# Patient Record
Sex: FEMALE | Race: WHITE | NOT HISPANIC OR LATINO | Employment: FULL TIME | URBAN - METROPOLITAN AREA
[De-identification: names, ages, dates, MRNs, and addresses within clinical notes are randomized per-mention and may not be internally consistent; named-entity substitution may affect disease eponyms.]

---

## 2017-01-04 ENCOUNTER — OFFICE VISIT (OUTPATIENT)
Dept: INTERNAL MEDICINE | Facility: CLINIC | Age: 47
End: 2017-01-04
Payer: OTHER GOVERNMENT

## 2017-01-04 VITALS
HEART RATE: 62 BPM | BODY MASS INDEX: 22.44 KG/M2 | TEMPERATURE: 98 F | WEIGHT: 134.69 LBS | RESPIRATION RATE: 16 BRPM | HEIGHT: 65 IN | SYSTOLIC BLOOD PRESSURE: 120 MMHG | DIASTOLIC BLOOD PRESSURE: 82 MMHG

## 2017-01-04 DIAGNOSIS — K58.9 IRRITABLE BOWEL SYNDROME WITHOUT DIARRHEA: ICD-10-CM

## 2017-01-04 DIAGNOSIS — F41.9 ANXIETY: Primary | Chronic | ICD-10-CM

## 2017-01-04 PROCEDURE — 99213 OFFICE O/P EST LOW 20 MIN: CPT | Mod: S$PBB,,, | Performed by: INTERNAL MEDICINE

## 2017-01-04 PROCEDURE — 99213 OFFICE O/P EST LOW 20 MIN: CPT | Mod: PBBFAC,PO | Performed by: INTERNAL MEDICINE

## 2017-01-04 PROCEDURE — 99999 PR PBB SHADOW E&M-EST. PATIENT-LVL III: CPT | Mod: PBBFAC,,, | Performed by: INTERNAL MEDICINE

## 2017-01-04 RX ORDER — ALPRAZOLAM 0.5 MG/1
0.5 TABLET ORAL 2 TIMES DAILY PRN
Qty: 60 TABLET | Refills: 0 | Status: SHIPPED | OUTPATIENT
Start: 2017-01-04 | End: 2017-01-04 | Stop reason: SDUPTHER

## 2017-01-04 RX ORDER — ALPRAZOLAM 0.5 MG/1
0.5 TABLET ORAL 2 TIMES DAILY PRN
Qty: 60 TABLET | Refills: 0 | Status: SHIPPED | OUTPATIENT
Start: 2017-01-04 | End: 2017-01-06 | Stop reason: SDUPTHER

## 2017-01-05 NOTE — PROGRESS NOTES
Subjective:       Patient ID: Marva He is a 46 y.o. female.    Chief Complaint: Medication Problem (med review)    HPI     Patient is a 45 yo F here today for medication refill. She has been doing well. Stress levels controlled/stable at work. She says she had pleasant holiday with time off. She has no active SI/HI. No sleep disturbance. She finds trouble losing weight. Not gaining any weight. She exercises 5 times per week with about 1 hour of cardio and watches her diet well. She does drink alcohol (wine) nightly but not in excess of one glass. She has no other acute issues today.    Review of Systems   Constitutional: Negative for chills, fatigue and fever.   HENT: Negative for congestion, ear pain, postnasal drip, rhinorrhea, sinus pressure and sore throat.    Eyes: Negative for itching and visual disturbance.   Respiratory: Negative for cough, shortness of breath and wheezing.    Cardiovascular: Negative for chest pain, palpitations and leg swelling.   Gastrointestinal: Negative for abdominal pain and nausea.   Genitourinary: Negative for dysuria.   Musculoskeletal: Negative for arthralgias and myalgias.   Skin: Negative for rash.   Neurological: Negative for weakness, light-headedness and headaches.       Objective:      Physical Exam   Constitutional: She is oriented to person, place, and time. She appears well-developed and well-nourished. No distress.   HENT:   Head: Normocephalic and atraumatic.   Mouth/Throat: Oropharynx is clear and moist. No oropharyngeal exudate.   Eyes: Conjunctivae and EOM are normal. Pupils are equal, round, and reactive to light. Right eye exhibits no discharge. Left eye exhibits no discharge.   Neck: Normal range of motion. Neck supple. No thyromegaly present.   Cardiovascular: Normal rate, regular rhythm and normal heart sounds.    No murmur heard.  Pulmonary/Chest: Effort normal and breath sounds normal. No respiratory distress. She has no wheezes. She has no rales.    Abdominal: Soft. She exhibits no distension. There is no tenderness.   Musculoskeletal: She exhibits no edema.   Lymphadenopathy:     She has no cervical adenopathy.   Neurological: She is alert and oriented to person, place, and time.   Skin: Skin is warm and dry. She is not diaphoretic.   Nursing note and vitals reviewed.      Assessment:       1. Anxiety    2. Irritable bowel syndrome without diarrhea        Plan:       She is concerned about difficulty losing weight  Given her exercise regimen, discussed some dietary changes could be made. Reduce stress levels.  Anxiety well controlled for now. Refills given for Prozac. Need to fax new Rx for Xanax 0.5 mg BID to mail order pharmacy for her. No other issues at this time.   IBS sx stable

## 2017-01-06 DIAGNOSIS — F41.9 ANXIETY: Chronic | ICD-10-CM

## 2017-01-06 RX ORDER — ALPRAZOLAM 0.5 MG/1
0.5 TABLET ORAL 2 TIMES DAILY PRN
Qty: 60 TABLET | Refills: 0 | Status: SHIPPED | OUTPATIENT
Start: 2017-01-06 | End: 2017-01-27 | Stop reason: SDUPTHER

## 2017-01-18 ENCOUNTER — TELEPHONE (OUTPATIENT)
Dept: INTERNAL MEDICINE | Facility: CLINIC | Age: 47
End: 2017-01-18

## 2017-01-18 NOTE — TELEPHONE ENCOUNTER
----- Message from Alicia Bangura sent at 1/18/2017 10:11 AM CST -----  Contact: Pt at [phone]  Pt said she still has not received script alprazolam (XANAX) 0.5 MG tablet in mail yet. Pt has been out of this medication for about 1 week now.     Pt wants to know if you can fill script at [facility] for a few tablets to last her until the script arrives in the mail:  [facility]:  [phone] (phone)

## 2017-01-18 NOTE — TELEPHONE ENCOUNTER
Spoke with pt-she is awaiting for mail order to come in- Dr Garcia ok'ed 28 tabs. rx'ed called into local pharmacy

## 2017-01-19 ENCOUNTER — OFFICE VISIT (OUTPATIENT)
Dept: INTERNAL MEDICINE | Facility: CLINIC | Age: 47
End: 2017-01-19
Payer: OTHER GOVERNMENT

## 2017-01-19 VITALS
TEMPERATURE: 99 F | WEIGHT: 133.19 LBS | HEART RATE: 78 BPM | SYSTOLIC BLOOD PRESSURE: 116 MMHG | HEIGHT: 65 IN | BODY MASS INDEX: 22.19 KG/M2 | DIASTOLIC BLOOD PRESSURE: 84 MMHG | RESPIRATION RATE: 16 BRPM

## 2017-01-19 DIAGNOSIS — S33.5XXA LUMBAR BACK SPRAIN, INITIAL ENCOUNTER: Primary | ICD-10-CM

## 2017-01-19 DIAGNOSIS — M62.830 MUSCLE SPASM OF BACK: ICD-10-CM

## 2017-01-19 PROCEDURE — 96372 THER/PROPH/DIAG INJ SC/IM: CPT | Mod: PBBFAC,PO

## 2017-01-19 PROCEDURE — 99214 OFFICE O/P EST MOD 30 MIN: CPT | Mod: S$PBB,,, | Performed by: INTERNAL MEDICINE

## 2017-01-19 PROCEDURE — 99213 OFFICE O/P EST LOW 20 MIN: CPT | Mod: PBBFAC,PO | Performed by: INTERNAL MEDICINE

## 2017-01-19 PROCEDURE — 99999 PR PBB SHADOW E&M-EST. PATIENT-LVL III: CPT | Mod: PBBFAC,,, | Performed by: INTERNAL MEDICINE

## 2017-01-19 RX ORDER — PREDNISONE 20 MG/1
TABLET ORAL
Qty: 20 TABLET | Refills: 0 | Status: SHIPPED | OUTPATIENT
Start: 2017-01-19 | End: 2017-08-03

## 2017-01-19 RX ORDER — TRAMADOL HYDROCHLORIDE 50 MG/1
50 TABLET ORAL EVERY 6 HOURS PRN
Qty: 45 TABLET | Refills: 0 | Status: SHIPPED | OUTPATIENT
Start: 2017-01-19 | End: 2017-01-29

## 2017-01-19 RX ORDER — KETOROLAC TROMETHAMINE 30 MG/ML
30 INJECTION, SOLUTION INTRAMUSCULAR; INTRAVENOUS
Status: COMPLETED | OUTPATIENT
Start: 2017-01-19 | End: 2017-01-19

## 2017-01-19 RX ADMIN — KETOROLAC TROMETHAMINE 30 MG: 30 INJECTION, SOLUTION INTRAMUSCULAR at 03:01

## 2017-01-19 NOTE — PROGRESS NOTES
Subjective:       Patient ID: Marva He is a 46 y.o. female.    Chief Complaint: Back Pain    HPI   Pt here for evaluation of acute onset of B/L lower back pain since yesterday afternoon. The pain is described as dull ache at rest and sharp with bending/walking. The pain can be rated up to a 7/10. Some improvement with NSAIDs/Heating pad. Pt does exercise on a regular basis but no heavy weights.   Review of Systems   Constitutional: Negative for activity change, appetite change, chills, diaphoresis, fatigue, fever and unexpected weight change.   HENT: Negative for postnasal drip, rhinorrhea, sinus pressure, sneezing, sore throat, trouble swallowing and voice change.    Respiratory: Negative for cough, shortness of breath and wheezing.    Cardiovascular: Negative for chest pain, palpitations and leg swelling.   Gastrointestinal: Negative for abdominal pain, blood in stool, constipation, diarrhea, nausea and vomiting.   Genitourinary: Negative for dysuria.   Musculoskeletal: Positive for back pain and myalgias. Negative for arthralgias.   Skin: Negative for rash and wound.   Allergic/Immunologic: Negative for environmental allergies and food allergies.   Hematological: Negative for adenopathy. Does not bruise/bleed easily.       Objective:      Physical Exam   Constitutional: She is oriented to person, place, and time. She appears well-developed and well-nourished. No distress.   HENT:   Head: Normocephalic and atraumatic.   Eyes: Conjunctivae and EOM are normal. Pupils are equal, round, and reactive to light. Right eye exhibits no discharge. Left eye exhibits no discharge. No scleral icterus.   Neck: Neck supple. No JVD present.   Cardiovascular: Normal rate, regular rhythm, normal heart sounds and intact distal pulses.    Pulmonary/Chest: Effort normal and breath sounds normal. No respiratory distress. She has no wheezes. She has no rales.   Abdominal: Soft. Bowel sounds are normal. There is no tenderness.    Musculoskeletal: She exhibits no edema.        Lumbar back: She exhibits tenderness, pain and spasm.        Back:    Lymphadenopathy:     She has no cervical adenopathy.   Neurological: She is alert and oriented to person, place, and time.   Skin: Skin is warm and dry. No rash noted. She is not diaphoretic. No pallor.       Assessment:       1. Lumbar back sprain, initial encounter    2. Muscle spasm of back        Plan:    1/2. Rx Prednisone taper and Tramadol 50 mg q6 hrs PRN          Toradol 30 mg IM          Continue with Heat PRN          Restart Stretching exercises

## 2017-01-20 ENCOUNTER — TELEPHONE (OUTPATIENT)
Dept: INTERNAL MEDICINE | Facility: CLINIC | Age: 47
End: 2017-01-20

## 2017-01-20 NOTE — TELEPHONE ENCOUNTER
----- Message from Philomena Villasenor sent at 1/20/2017  9:03 AM CST -----  Contact: [facility]- [phone]  Pharmacy is calling to clarify an RX.  RX name: TRAMADOL HCL 50 mg Oral Q6H PRN   What do they need to clarify:  To verify that is a true call in prescription.  Comments:

## 2017-01-20 NOTE — TELEPHONE ENCOUNTER
----- Message from Myles Lobato sent at 1/20/2017  8:09 AM CST -----  Contact: self   Pt was seen in office on yesterday, stated she didn't receive medication for pain. Chart  show tramadol (ULTRAM) 50 mg tablet.    Please advise pt

## 2017-01-27 ENCOUNTER — TELEPHONE (OUTPATIENT)
Dept: INTERNAL MEDICINE | Facility: CLINIC | Age: 47
End: 2017-01-27

## 2017-01-27 DIAGNOSIS — F41.9 ANXIETY: Chronic | ICD-10-CM

## 2017-01-27 RX ORDER — ALPRAZOLAM 0.5 MG/1
0.5 TABLET ORAL 2 TIMES DAILY PRN
Qty: 60 TABLET | Refills: 0 | Status: SHIPPED | OUTPATIENT
Start: 2017-01-27 | End: 2017-07-05 | Stop reason: SDUPTHER

## 2017-01-27 NOTE — TELEPHONE ENCOUNTER
Pt has never received her rx for 90 day supply of xanax with her mail order she would like to know if we can do it to her local pharmacy since she is have problems with this if she can.   please advise.

## 2017-04-09 NOTE — TELEPHONE ENCOUNTER
----- Message from Crystal Hobbs sent at 1/27/2017  9:18 AM CST -----  Contact: patient- [phone]  RX request - refill or new RX.  Is this a refill or new RX: refill   RX name and strength: alprazolam (XANAX) 0.5 MG tablet   Directions:   Is this a 30 day or 90 day RX:  30 day  Pharmacy name and phone #: [facility] [phone] (Phone)  [phone] (Fax)  Comments:  Patient is needing a refill sent to Pharmacy because she is almost out. Rx was supposed to be sent to [facility] but if they send through mail she will not have enough to hold her till it come in through mail.         Statement Selected

## 2017-06-26 DIAGNOSIS — F41.9 ANXIETY: ICD-10-CM

## 2017-06-27 RX ORDER — FLUOXETINE HYDROCHLORIDE 20 MG/1
CAPSULE ORAL
Qty: 90 CAPSULE | Refills: 2 | Status: SHIPPED | OUTPATIENT
Start: 2017-06-27 | End: 2018-02-08 | Stop reason: SDUPTHER

## 2017-06-27 RX ORDER — VALACYCLOVIR HYDROCHLORIDE 500 MG/1
TABLET, FILM COATED ORAL
Qty: 90 TABLET | Refills: 2 | Status: SHIPPED | OUTPATIENT
Start: 2017-06-27 | End: 2017-09-08 | Stop reason: SDUPTHER

## 2017-06-27 RX ORDER — DOXEPIN HYDROCHLORIDE 25 MG/1
CAPSULE ORAL
Qty: 90 CAPSULE | Refills: 2 | Status: SHIPPED | OUTPATIENT
Start: 2017-06-27 | End: 2017-08-03

## 2017-07-05 ENCOUNTER — TELEPHONE (OUTPATIENT)
Dept: INTERNAL MEDICINE | Facility: CLINIC | Age: 47
End: 2017-07-05

## 2017-07-05 DIAGNOSIS — F41.9 ANXIETY: Chronic | ICD-10-CM

## 2017-07-05 RX ORDER — ALPRAZOLAM 0.5 MG/1
0.5 TABLET ORAL 2 TIMES DAILY PRN
Qty: 60 TABLET | Refills: 0 | Status: SHIPPED | OUTPATIENT
Start: 2017-07-05 | End: 2017-07-13 | Stop reason: SDUPTHER

## 2017-07-05 NOTE — TELEPHONE ENCOUNTER
----- Message from Jenna Garcia MD sent at 7/5/2017  4:33 PM CDT -----  Please phone in Xanax. Thanks

## 2017-07-05 NOTE — TELEPHONE ENCOUNTER
----- Message from Sheeba Hill sent at 7/3/2017  9:01 AM CDT -----  Contact: Self. [phone]  RX request - refill or new RX.  Is this a refill or new RX:  refill  RX name and strength: Alprazolam (XANAX) 0.5 MG tablet  Directions:   Is this a 30 day or 90 day RX:  30  Pharmacy name and phone #: [facility] [phone] (phone) [phone] (fax)  Comments:  2-3 days left of medictaion [facility]

## 2017-07-13 ENCOUNTER — TELEPHONE (OUTPATIENT)
Dept: INTERNAL MEDICINE | Facility: CLINIC | Age: 47
End: 2017-07-13

## 2017-07-13 DIAGNOSIS — F41.9 ANXIETY: Chronic | ICD-10-CM

## 2017-07-13 RX ORDER — ALPRAZOLAM 0.5 MG/1
0.5 TABLET ORAL 2 TIMES DAILY PRN
Qty: 60 TABLET | Refills: 0 | Status: SHIPPED | OUTPATIENT
Start: 2017-07-13 | End: 2017-09-08 | Stop reason: SDUPTHER

## 2017-07-13 NOTE — TELEPHONE ENCOUNTER
Received a fax from [facility] requesting refills on on her alprazolam 0.5 mg directions. One tablet BID prn anxiety.

## 2017-07-13 NOTE — TELEPHONE ENCOUNTER
[facility]

## 2017-07-13 NOTE — TELEPHONE ENCOUNTER
----- Message from Jenna Garcia MD sent at 7/13/2017  3:17 PM CDT -----  Please phone in xanax. Thanks

## 2017-08-03 ENCOUNTER — OFFICE VISIT (OUTPATIENT)
Dept: INTERNAL MEDICINE | Facility: CLINIC | Age: 47
End: 2017-08-03
Payer: OTHER GOVERNMENT

## 2017-08-03 VITALS
RESPIRATION RATE: 16 BRPM | DIASTOLIC BLOOD PRESSURE: 70 MMHG | TEMPERATURE: 98 F | HEIGHT: 65 IN | WEIGHT: 137.56 LBS | BODY MASS INDEX: 22.92 KG/M2 | HEART RATE: 60 BPM | SYSTOLIC BLOOD PRESSURE: 119 MMHG

## 2017-08-03 DIAGNOSIS — F41.9 ANXIETY: Chronic | ICD-10-CM

## 2017-08-03 DIAGNOSIS — Z00.00 ANNUAL PHYSICAL EXAM: Primary | ICD-10-CM

## 2017-08-03 DIAGNOSIS — G47.00 INSOMNIA, UNSPECIFIED TYPE: ICD-10-CM

## 2017-08-03 DIAGNOSIS — Z12.31 ENCOUNTER FOR SCREENING MAMMOGRAM FOR BREAST CANCER: ICD-10-CM

## 2017-08-03 PROCEDURE — 99999 PR PBB SHADOW E&M-EST. PATIENT-LVL III: CPT | Mod: PBBFAC,,, | Performed by: INTERNAL MEDICINE

## 2017-08-03 PROCEDURE — 99213 OFFICE O/P EST LOW 20 MIN: CPT | Mod: PBBFAC,PO | Performed by: INTERNAL MEDICINE

## 2017-08-03 PROCEDURE — 99396 PREV VISIT EST AGE 40-64: CPT | Mod: S$PBB,,, | Performed by: INTERNAL MEDICINE

## 2017-08-03 RX ORDER — TRAZODONE HYDROCHLORIDE 100 MG/1
100 TABLET ORAL NIGHTLY PRN
Qty: 30 TABLET | Refills: 11 | Status: SHIPPED | OUTPATIENT
Start: 2017-08-03 | End: 2018-02-08 | Stop reason: SDUPTHER

## 2017-08-03 NOTE — PROGRESS NOTES
Subjective:       Patient ID: Marva He is a 47 y.o. female.    Chief Complaint: Establish Care and Medication Refill    HPI   47 y.o. Female here for annual exam.     Cholesterol: (needs)  Vaccines: Influenza (declined); Tetanus (declined)  Eye exam: has appt  Mammogram: needs  Gyn exam: declined    Exercise: Cardio/weights 6x/wk  Diet: regular    Past Medical History:  No date: Anxiety  No date: Herpes  No date: IBS (irritable bowel syndrome)  Past Surgical History:  No date: BREAST SURGERY  No date: HYSTERECTOMY  Social History    Marital status:              Spouse name:                       Years of education:                 Number of children:               Occupational History    Teacher    Social History Main Topics    Smoking status: Never Smoker                                                                Smokeless tobacco: Never Used                        Alcohol use: Yes                Comment: socially    Drug use: No              Sexual activity: Yes               Partners with: Male    Review of patient's allergies indicates:  No Known Allergies  Ms. He had no medications administered during this visit.    Review of Systems   Constitutional: Negative for activity change, appetite change, chills, diaphoresis, fatigue, fever and unexpected weight change.   HENT: Negative for congestion, mouth sores, postnasal drip, rhinorrhea, sinus pressure, sneezing, sore throat, trouble swallowing and voice change.    Eyes: Negative for pain, discharge and visual disturbance.   Respiratory: Negative for cough, shortness of breath and wheezing.    Cardiovascular: Negative for chest pain, palpitations and leg swelling.   Gastrointestinal: Negative for abdominal pain, blood in stool, constipation, diarrhea, nausea and vomiting.   Endocrine: Negative for cold intolerance and heat intolerance.   Genitourinary: Negative for difficulty urinating, dysuria, frequency, hematuria and urgency.   Musculoskeletal:  Negative for arthralgias and myalgias.   Skin: Negative for rash and wound.   Allergic/Immunologic: Negative for environmental allergies and food allergies.   Neurological: Negative for dizziness, tremors, seizures, syncope, weakness, light-headedness and headaches.   Hematological: Negative for adenopathy. Does not bruise/bleed easily.   Psychiatric/Behavioral: Positive for sleep disturbance. Negative for confusion, self-injury and suicidal ideas. The patient is nervous/anxious.        Objective:      Physical Exam   Constitutional: She is oriented to person, place, and time. She appears well-developed and well-nourished. No distress.   HENT:   Head: Normocephalic and atraumatic.   Right Ear: External ear normal.   Left Ear: External ear normal.   Nose: Nose normal.   Mouth/Throat: Oropharynx is clear and moist. No oropharyngeal exudate.   Eyes: Conjunctivae and EOM are normal. Pupils are equal, round, and reactive to light. Right eye exhibits no discharge. Left eye exhibits no discharge. No scleral icterus.   Neck: Neck supple. No JVD present. No thyromegaly present.   Cardiovascular: Normal rate, regular rhythm, normal heart sounds and intact distal pulses.    No murmur heard.  Pulmonary/Chest: Effort normal and breath sounds normal. No respiratory distress. She has no wheezes. She has no rales. She exhibits no tenderness.   Abdominal: Soft. Bowel sounds are normal. She exhibits no distension. There is no tenderness. There is no guarding.   Musculoskeletal: She exhibits no edema.   Lymphadenopathy:     She has no cervical adenopathy.   Neurological: She is alert and oriented to person, place, and time. She has normal reflexes. No cranial nerve deficit. Coordination normal.   Skin: Skin is warm and dry. No rash noted. She is not diaphoretic. No pallor.   Psychiatric: She has a normal mood and affect. Judgment normal.   Nursing note and vitals reviewed.      Assessment:       1. Annual physical exam    2. Anxiety     3. Insomnia, unspecified type    4. Encounter for screening mammogram for breast cancer        Plan:    1. Complete blood work, UA       Vaccines: Influenza (declined); Tetanus (declined)       Eye exam: has appt       Mammogram: needs       Gyn exam: declined   2. Anxiety- stable on Prozac 20 mg daily and Xanax 0.5 mg BID   3. Insomnia- Rx Trazodone 100 mg qHS PRN   4. Mammogram ordered    5. F/u in 1 yr or PRN

## 2017-08-12 ENCOUNTER — LAB VISIT (OUTPATIENT)
Dept: LAB | Facility: HOSPITAL | Age: 47
End: 2017-08-12
Attending: INTERNAL MEDICINE
Payer: OTHER GOVERNMENT

## 2017-08-12 ENCOUNTER — TELEPHONE (OUTPATIENT)
Dept: INTERNAL MEDICINE | Facility: CLINIC | Age: 47
End: 2017-08-12

## 2017-08-12 DIAGNOSIS — Z00.00 ANNUAL PHYSICAL EXAM: Primary | ICD-10-CM

## 2017-08-12 DIAGNOSIS — Z00.00 ANNUAL PHYSICAL EXAM: ICD-10-CM

## 2017-08-15 ENCOUNTER — TELEPHONE (OUTPATIENT)
Dept: INTERNAL MEDICINE | Facility: CLINIC | Age: 47
End: 2017-08-15

## 2017-08-15 DIAGNOSIS — D72.819 LEUKOPENIA, UNSPECIFIED TYPE: Primary | ICD-10-CM

## 2017-08-15 NOTE — TELEPHONE ENCOUNTER
----- Message from Alicia Bangura sent at 8/15/2017  2:03 PM CDT -----  Contact: Pt at [phone]  Pt would like to know what was her white blood cell count. Pt said if she does not  please leave a detailed message on voicemail.

## 2017-08-15 NOTE — TELEPHONE ENCOUNTER
----- Message from Heor Peralta DO sent at 8/15/2017 12:58 PM CDT -----  Urine study slightly suggestive for a UTI, are you having any symptoms?? If no, there is no need for treatment.

## 2017-08-18 ENCOUNTER — TELEPHONE (OUTPATIENT)
Dept: INTERNAL MEDICINE | Facility: CLINIC | Age: 47
End: 2017-08-18

## 2017-08-18 RX ORDER — NITROFURANTOIN 25; 75 MG/1; MG/1
100 CAPSULE ORAL 2 TIMES DAILY
Qty: 10 CAPSULE | Refills: 0 | Status: SHIPPED | OUTPATIENT
Start: 2017-08-18 | End: 2017-09-19 | Stop reason: ALTCHOICE

## 2017-08-18 RX ORDER — PHENAZOPYRIDINE HYDROCHLORIDE 200 MG/1
200 TABLET, FILM COATED ORAL 3 TIMES DAILY PRN
Qty: 9 TABLET | Refills: 0 | Status: SHIPPED | OUTPATIENT
Start: 2017-08-18 | End: 2017-08-28

## 2017-08-18 NOTE — TELEPHONE ENCOUNTER
----- Message from Vladislav Mooney sent at 8/17/2017  5:38 PM CDT -----  Contact: Pt   PT missed a call and would the nurse to return their call      Pt can be reached at [phone]

## 2017-09-08 ENCOUNTER — TELEPHONE (OUTPATIENT)
Dept: INTERNAL MEDICINE | Facility: CLINIC | Age: 47
End: 2017-09-08

## 2017-09-08 DIAGNOSIS — F41.9 ANXIETY: Chronic | ICD-10-CM

## 2017-09-08 RX ORDER — ALPRAZOLAM 0.5 MG/1
0.5 TABLET ORAL 2 TIMES DAILY PRN
Qty: 60 TABLET | Refills: 0 | Status: SHIPPED | OUTPATIENT
Start: 2017-09-08 | End: 2017-10-06 | Stop reason: SDUPTHER

## 2017-09-08 RX ORDER — VALACYCLOVIR HYDROCHLORIDE 500 MG/1
500 TABLET, FILM COATED ORAL DAILY
Qty: 90 TABLET | Refills: 3 | Status: SHIPPED | OUTPATIENT
Start: 2017-09-08

## 2017-09-08 RX ORDER — ALPRAZOLAM 0.5 MG/1
TABLET ORAL
Qty: 60 TABLET | Refills: 0 | OUTPATIENT
Start: 2017-09-08

## 2017-09-08 NOTE — TELEPHONE ENCOUNTER
----- Message from Myles Lobato sent at 9/8/2017 11:27 AM CDT -----  Contact: Jeromy naranjo [facility]   Pharmacy is calling to clarify an RX.  RX name:  alprazolam (XANAX) 0.5 MG tablet   What do they need to clarify:  Refill   Comments:

## 2017-09-08 NOTE — TELEPHONE ENCOUNTER
----- Message from Sandra Jes sent at 9/8/2017 11:58 AM CDT -----  Contact: pt [phone]  RX request - refill or new RX.  Is this a refill or new RX: refill   RX name and strength: alprazolam (XANAX) 0.5 MG tablet  Directions:   Is this a 30 day or 90 day RX:    Pharmacy name and phone #: [email or web address] [phone]  Comments:      RX request - refill or new RX.  Is this a refill or new RX:  refill  RX name and strength: valacyclovir (VALTREX) 500 MG tablet  Directions:   Is this a 30 day or 90 day RX:    Pharmacy name and phone #: [email or web address] [phone]  Comments:

## 2017-09-16 ENCOUNTER — LAB VISIT (OUTPATIENT)
Dept: LAB | Facility: HOSPITAL | Age: 47
End: 2017-09-16
Attending: INTERNAL MEDICINE
Payer: OTHER GOVERNMENT

## 2017-09-16 DIAGNOSIS — D72.819 LEUKOPENIA, UNSPECIFIED TYPE: ICD-10-CM

## 2017-09-16 LAB
BASOPHILS # BLD AUTO: 0.03 K/UL
BASOPHILS NFR BLD: 0.7 %
DIFFERENTIAL METHOD: ABNORMAL
EOSINOPHIL # BLD AUTO: 0.2 K/UL
EOSINOPHIL NFR BLD: 5.4 %
ERYTHROCYTE [DISTWIDTH] IN BLOOD BY AUTOMATED COUNT: 12.7 %
HCT VFR BLD AUTO: 44.4 %
HGB BLD-MCNC: 14.3 G/DL
LYMPHOCYTES # BLD AUTO: 0.9 K/UL
LYMPHOCYTES NFR BLD: 20.5 %
MCH RBC QN AUTO: 33.1 PG
MCHC RBC AUTO-ENTMCNC: 32.2 G/DL
MCV RBC AUTO: 103 FL
MONOCYTES # BLD AUTO: 0.4 K/UL
MONOCYTES NFR BLD: 8.7 %
NEUTROPHILS # BLD AUTO: 2.8 K/UL
NEUTROPHILS NFR BLD: 64.7 %
PLATELET # BLD AUTO: 210 K/UL
PMV BLD AUTO: 10.4 FL
RBC # BLD AUTO: 4.32 M/UL
WBC # BLD AUTO: 4.25 K/UL

## 2017-09-16 PROCEDURE — 85025 COMPLETE CBC W/AUTO DIFF WBC: CPT

## 2017-09-16 PROCEDURE — 36415 COLL VENOUS BLD VENIPUNCTURE: CPT | Mod: PO

## 2017-09-19 ENCOUNTER — LAB VISIT (OUTPATIENT)
Dept: LAB | Facility: HOSPITAL | Age: 47
End: 2017-09-19
Attending: INTERNAL MEDICINE
Payer: OTHER GOVERNMENT

## 2017-09-19 ENCOUNTER — OFFICE VISIT (OUTPATIENT)
Dept: INTERNAL MEDICINE | Facility: CLINIC | Age: 47
End: 2017-09-19
Payer: OTHER GOVERNMENT

## 2017-09-19 VITALS
DIASTOLIC BLOOD PRESSURE: 74 MMHG | SYSTOLIC BLOOD PRESSURE: 122 MMHG | HEIGHT: 65 IN | RESPIRATION RATE: 16 BRPM | BODY MASS INDEX: 21.31 KG/M2 | HEART RATE: 60 BPM | WEIGHT: 127.88 LBS | TEMPERATURE: 98 F

## 2017-09-19 DIAGNOSIS — R10.11 RIGHT UPPER QUADRANT ABDOMINAL PAIN: ICD-10-CM

## 2017-09-19 DIAGNOSIS — K80.50 BILIARY COLIC: ICD-10-CM

## 2017-09-19 DIAGNOSIS — R10.11 RIGHT UPPER QUADRANT ABDOMINAL PAIN: Primary | ICD-10-CM

## 2017-09-19 LAB
ALBUMIN SERPL BCP-MCNC: 4 G/DL
ALP SERPL-CCNC: 37 U/L
ALT SERPL W/O P-5'-P-CCNC: 14 U/L
ANION GAP SERPL CALC-SCNC: 7 MMOL/L
AST SERPL-CCNC: 19 U/L
BILIRUB SERPL-MCNC: 0.6 MG/DL
BUN SERPL-MCNC: 12 MG/DL
CALCIUM SERPL-MCNC: 9.4 MG/DL
CHLORIDE SERPL-SCNC: 102 MMOL/L
CO2 SERPL-SCNC: 27 MMOL/L
CREAT SERPL-MCNC: 0.7 MG/DL
EST. GFR  (AFRICAN AMERICAN): >60 ML/MIN/1.73 M^2
EST. GFR  (NON AFRICAN AMERICAN): >60 ML/MIN/1.73 M^2
GLUCOSE SERPL-MCNC: 84 MG/DL
POTASSIUM SERPL-SCNC: 4.1 MMOL/L
PROT SERPL-MCNC: 6.8 G/DL
SODIUM SERPL-SCNC: 136 MMOL/L

## 2017-09-19 PROCEDURE — 36415 COLL VENOUS BLD VENIPUNCTURE: CPT | Mod: PO

## 2017-09-19 PROCEDURE — 99214 OFFICE O/P EST MOD 30 MIN: CPT | Mod: S$PBB,,, | Performed by: INTERNAL MEDICINE

## 2017-09-19 PROCEDURE — 99999 PR PBB SHADOW E&M-EST. PATIENT-LVL III: CPT | Mod: PBBFAC,,, | Performed by: INTERNAL MEDICINE

## 2017-09-19 PROCEDURE — 3008F BODY MASS INDEX DOCD: CPT | Mod: ,,, | Performed by: INTERNAL MEDICINE

## 2017-09-19 PROCEDURE — 80053 COMPREHEN METABOLIC PANEL: CPT

## 2017-09-19 PROCEDURE — 99213 OFFICE O/P EST LOW 20 MIN: CPT | Mod: PBBFAC,PO | Performed by: INTERNAL MEDICINE

## 2017-09-19 NOTE — PROGRESS NOTES
Subjective:       Patient ID: Marva He is a 47 y.o. female.    Chief Complaint: Abdominal Pain (pain today at 5.  tried otc nexium bid for 3 days and no help)    HPI   Pt here for evaluation of 2 years of persistent symptoms of RUQ abdominal pain described as burning in nature. Eating foods can made it worse. Associated symptoms of nausea. Not eating makes it better.   Review of Systems   Constitutional: Negative for activity change, appetite change, chills, diaphoresis, fatigue, fever and unexpected weight change.   HENT: Negative for postnasal drip, rhinorrhea, sinus pressure, sneezing, sore throat, trouble swallowing and voice change.    Respiratory: Negative for cough, shortness of breath and wheezing.    Cardiovascular: Negative for chest pain, palpitations and leg swelling.   Gastrointestinal: Positive for abdominal pain and nausea. Negative for abdominal distention, anal bleeding, blood in stool, constipation, diarrhea, rectal pain and vomiting.   Genitourinary: Negative for dysuria.   Musculoskeletal: Negative for arthralgias and myalgias.   Skin: Negative for rash and wound.   Allergic/Immunologic: Negative for environmental allergies and food allergies.   Hematological: Negative for adenopathy. Does not bruise/bleed easily.       Objective:      Physical Exam   Constitutional: She is oriented to person, place, and time. She appears well-developed and well-nourished. No distress.   HENT:   Head: Normocephalic and atraumatic.   Eyes: Conjunctivae and EOM are normal. Pupils are equal, round, and reactive to light. Right eye exhibits no discharge. Left eye exhibits no discharge. No scleral icterus.   Neck: Neck supple. No JVD present.   Cardiovascular: Normal rate, regular rhythm, normal heart sounds and intact distal pulses.    Pulmonary/Chest: Effort normal and breath sounds normal. No respiratory distress. She has no wheezes. She has no rales.   Abdominal: Soft. Normal appearance and bowel sounds are  normal. There is tenderness in the right upper quadrant. There is no rigidity, no rebound, no guarding, no CVA tenderness, no tenderness at McBurney's point and negative Limon's sign.   Musculoskeletal: She exhibits no edema.   Lymphadenopathy:     She has no cervical adenopathy.   Neurological: She is alert and oriented to person, place, and time.   Skin: Skin is warm and dry. No rash noted. She is not diaphoretic. No pallor.       Assessment:       1. Right upper quadrant abdominal pain    2. Biliary colic        Plan:    1. CMP, RUQ US       Refrain from fatty/fried foods       Referral to general surgery

## 2017-09-20 ENCOUNTER — HOSPITAL ENCOUNTER (OUTPATIENT)
Dept: RADIOLOGY | Facility: HOSPITAL | Age: 47
Discharge: HOME | End: 2017-09-20
Attending: INTERNAL MEDICINE
Payer: OTHER GOVERNMENT

## 2017-09-20 DIAGNOSIS — R10.11 RIGHT UPPER QUADRANT ABDOMINAL PAIN: ICD-10-CM

## 2017-09-20 DIAGNOSIS — K80.50 BILIARY COLIC: ICD-10-CM

## 2017-09-20 PROCEDURE — 76705 ECHO EXAM OF ABDOMEN: CPT | Mod: 26,,, | Performed by: RADIOLOGY

## 2017-09-20 PROCEDURE — 76705 ECHO EXAM OF ABDOMEN: CPT | Mod: TC

## 2017-09-21 ENCOUNTER — TELEPHONE (OUTPATIENT)
Dept: INTERNAL MEDICINE | Facility: CLINIC | Age: 47
End: 2017-09-21

## 2017-09-21 DIAGNOSIS — R10.816 EPIGASTRIC ABDOMINAL TENDERNESS WITHOUT REBOUND TENDERNESS: Primary | ICD-10-CM

## 2017-09-21 DIAGNOSIS — R10.11 RIGHT UPPER QUADRANT ABDOMINAL PAIN: ICD-10-CM

## 2017-09-21 RX ORDER — PANTOPRAZOLE SODIUM 40 MG/1
40 TABLET, DELAYED RELEASE ORAL DAILY
Qty: 30 TABLET | Refills: 5 | Status: SHIPPED | OUTPATIENT
Start: 2017-09-21 | End: 2018-02-08 | Stop reason: SDUPTHER

## 2017-09-21 NOTE — TELEPHONE ENCOUNTER
----- Message from Wilda Peterson sent at 9/20/2017  3:59 PM CDT -----  Contact: self/[phone]  Patient called in regards needing to know results for labs that were done on 09/19/17. Please call and advise.       Thank you!!!

## 2017-09-21 NOTE — TELEPHONE ENCOUNTER
----- Message from Alicia Bangura sent at 9/21/2017  8:32 AM CDT -----  Contact: Pt at [phone]  Patient is returning a phone call.  Who left a message for the patient: CC  Does patient know what this is regarding:  results  Comments:

## 2017-10-02 ENCOUNTER — OFFICE VISIT (OUTPATIENT)
Dept: GASTROENTEROLOGY | Facility: CLINIC | Age: 47
End: 2017-10-02
Payer: OTHER GOVERNMENT

## 2017-10-02 VITALS
SYSTOLIC BLOOD PRESSURE: 119 MMHG | BODY MASS INDEX: 20.83 KG/M2 | DIASTOLIC BLOOD PRESSURE: 63 MMHG | HEIGHT: 65 IN | WEIGHT: 125 LBS

## 2017-10-02 DIAGNOSIS — R10.11 RUQ ABDOMINAL PAIN: Primary | ICD-10-CM

## 2017-10-02 PROCEDURE — 99203 OFFICE O/P NEW LOW 30 MIN: CPT | Mod: S$PBB,,, | Performed by: INTERNAL MEDICINE

## 2017-10-02 PROCEDURE — 99999 PR PBB SHADOW E&M-EST. PATIENT-LVL II: CPT | Mod: PBBFAC,,, | Performed by: INTERNAL MEDICINE

## 2017-10-02 PROCEDURE — 99212 OFFICE O/P EST SF 10 MIN: CPT | Mod: PBBFAC,PN | Performed by: INTERNAL MEDICINE

## 2017-10-02 NOTE — PROGRESS NOTES
Subjective:       Patient ID: Marva He is a 47 y.o. female.    Chief Complaint: Abdominal Pain    Abdominal Pain   This is a chronic problem. The current episode started more than 1 year ago. The problem occurs constantly. The problem has been unchanged. The pain is located in the RUQ. The pain is at a severity of 5/10. The quality of the pain is burning. The abdominal pain radiates to the epigastric region. Associated symptoms include nausea and weight loss (5 Ibs). Pertinent negatives include no arthralgias, fever or vomiting. Exacerbated by: certain food. The pain is relieved by certain positions. She has tried proton pump inhibitors for the symptoms. Prior diagnostic workup includes ultrasound.     Review of Systems   Constitutional: Positive for weight loss (5 Ibs). Negative for appetite change and fever.   HENT: Negative for sore throat and trouble swallowing.    Eyes: Negative for photophobia and visual disturbance.   Respiratory: Negative for wheezing.    Cardiovascular: Negative for chest pain and palpitations.   Gastrointestinal: Positive for abdominal pain and nausea. Negative for vomiting.        See HPI for details   Musculoskeletal: Negative for arthralgias, joint swelling and neck pain.   Skin: Negative for rash and wound.   Neurological: Negative for dizziness, tremors and weakness.   Psychiatric/Behavioral: Negative for behavioral problems and suicidal ideas.       Objective:      Physical Exam   Constitutional: She is oriented to person, place, and time. She appears well-nourished.   HENT:   Mouth/Throat: Oropharynx is clear and moist.   Eyes: Pupils are equal, round, and reactive to light.   Neck: Neck supple.   Cardiovascular: Normal heart sounds.    Pulmonary/Chest: Effort normal and breath sounds normal.   Abdominal: Soft. She exhibits no mass. There is no tenderness. There is no guarding.   Musculoskeletal: Normal range of motion.   Lymphadenopathy:     She has no cervical adenopathy.    Neurological: She is alert and oriented to person, place, and time.   Skin: Skin is warm. No rash noted.   Psychiatric: She has a normal mood and affect.       Assessment:       1. [facility] abdominal pain        Plan:       Marva was seen today for abdominal pain.    Diagnoses and all orders for this visit:    [facility] abdominal pain  -     Case request GI: ESOPHAGOGASTRODUODENOSCOPY (EGD)

## 2017-10-02 NOTE — LETTER
October 3, 2017      Hero Peralta, DO  [address] [place] [address]           [facility]  [address]  [place] [address]  Phone: [phone]          Patient: Marva He   MR Number: [id number]   YOB: 1970   Date of Visit: 10/2/2017       Dear Dr. Hero Peralta:    Thank you for referring Marva He to me for evaluation. Attached you will find relevant portions of my assessment and plan of care.    If you have questions, please do not hesitate to call me. I look forward to following Marva He along with you.    Sincerely,    Emerita Mccracken MD    Enclosure  CC:  No Recipients    If you would like to receive this communication electronically, please contact [email or web address] or [phone] to request more information on [employer] access.    For providers and/or their staff who would like to refer a patient to [facility], please contact us through our one-stop-shop provider referral line, Chantell Pascual, at [phone].    If you feel you have received this communication in error or would no longer like to receive these types of communications, please e-mail [email or web address]

## 2017-10-06 DIAGNOSIS — F41.9 ANXIETY: Chronic | ICD-10-CM

## 2017-10-06 NOTE — TELEPHONE ENCOUNTER
"----- Message from Brittaney Soares sent at 10/6/2017  4:23 PM CDT -----  Contact: pt [phone]  RX request - refill or new RX.  Is this a refill or new RX: refill    RX name and strength:alprazolam (XANAX) 0.5 MG tablet   Directions: 1 twice daily  Is this a 30 day or 90 day RX:  60  Pharmacy name and phone # (DON'T enter "on file" or "in chart"): [facility] [phone] (Phone)  [phone] (Fax)  Comments:          "

## 2017-10-09 RX ORDER — ALPRAZOLAM 0.5 MG/1
0.5 TABLET ORAL 2 TIMES DAILY PRN
Qty: 60 TABLET | Refills: 2 | Status: SHIPPED | OUTPATIENT
Start: 2017-10-09 | End: 2017-11-09 | Stop reason: SDUPTHER

## 2017-10-10 ENCOUNTER — TELEPHONE (OUTPATIENT)
Dept: INTERNAL MEDICINE | Facility: CLINIC | Age: 47
End: 2017-10-10

## 2017-10-10 NOTE — TELEPHONE ENCOUNTER
Xanax was cancelled @[facility] pharmacy; [facility]ax was called to [facility] per patient request.

## 2017-10-10 NOTE — TELEPHONE ENCOUNTER
"----- Message from Nila Izaiah sent at 10/10/2017 12:54 PM CDT -----  Contact: [place] with [facility]   [phone]  RX request - refill or new RX.  Is this a refill or new RX:  Refiill  RX name and strength:    Alprazolam (XANAX) 0.5 MG tablet  Directions:   Is this a 30 day or 90 day RX:  30  Pharmacy name and phone # (DON'T enter "on file" or "in chart"):  [facility]  [phone]      Comments:  Please do not call this in again to  [facility]. [facility]  has a contract with [facility] not [facility]. She's changing it to the one I listed above.    "

## 2017-10-10 NOTE — TELEPHONE ENCOUNTER
----- Message from Sheeba Hill sent at 10/9/2017  4:16 PM CDT -----  Contact: Self [phone]  Pt medication was sent to incorrect pharmacy. Was told it would need to be closed at [facility] and sent to [facility] [phone] (phone) [phone] (Fax)

## 2017-10-31 ENCOUNTER — HOSPITAL ENCOUNTER (OUTPATIENT)
Facility: HOSPITAL | Age: 47
Discharge: HOME | End: 2017-10-31
Attending: INTERNAL MEDICINE | Admitting: INTERNAL MEDICINE
Payer: OTHER GOVERNMENT

## 2017-10-31 ENCOUNTER — SURGERY (OUTPATIENT)
Age: 47
End: 2017-10-31

## 2017-10-31 ENCOUNTER — ANESTHESIA EVENT (OUTPATIENT)
Dept: ENDOSCOPY | Facility: HOSPITAL | Age: 47
End: 2017-10-31
Payer: OTHER GOVERNMENT

## 2017-10-31 ENCOUNTER — ANESTHESIA (OUTPATIENT)
Dept: ENDOSCOPY | Facility: HOSPITAL | Age: 47
End: 2017-10-31
Payer: OTHER GOVERNMENT

## 2017-10-31 VITALS
SYSTOLIC BLOOD PRESSURE: 110 MMHG | WEIGHT: 123 LBS | BODY MASS INDEX: 20.49 KG/M2 | TEMPERATURE: 98 F | OXYGEN SATURATION: 100 % | DIASTOLIC BLOOD PRESSURE: 77 MMHG | RESPIRATION RATE: 12 BRPM | HEART RATE: 60 BPM | HEIGHT: 65 IN

## 2017-10-31 DIAGNOSIS — R10.9 ABDOMINAL PAIN: ICD-10-CM

## 2017-10-31 DIAGNOSIS — R10.11 RIGHT UPPER QUADRANT ABDOMINAL PAIN: Primary | ICD-10-CM

## 2017-10-31 PROCEDURE — 37000008 HC ANESTHESIA 1ST 15 MINUTES: Performed by: INTERNAL MEDICINE

## 2017-10-31 PROCEDURE — 37000009 HC ANESTHESIA EA ADD 15 MINS: Performed by: INTERNAL MEDICINE

## 2017-10-31 PROCEDURE — 43235 EGD DIAGNOSTIC BRUSH WASH: CPT | Performed by: INTERNAL MEDICINE

## 2017-10-31 PROCEDURE — 25000003 PHARM REV CODE 250: Performed by: INTERNAL MEDICINE

## 2017-10-31 PROCEDURE — 43235 EGD DIAGNOSTIC BRUSH WASH: CPT | Mod: ,,, | Performed by: INTERNAL MEDICINE

## 2017-10-31 PROCEDURE — 63600175 PHARM REV CODE 636 W HCPCS: Performed by: NURSE ANESTHETIST, CERTIFIED REGISTERED

## 2017-10-31 RX ORDER — LIDOCAINE HCL/PF 100 MG/5ML
SYRINGE (ML) INTRAVENOUS
Status: DISCONTINUED | OUTPATIENT
Start: 2017-10-31 | End: 2017-10-31

## 2017-10-31 RX ORDER — SODIUM CHLORIDE 9 MG/ML
INJECTION, SOLUTION INTRAVENOUS CONTINUOUS
Status: DISCONTINUED | OUTPATIENT
Start: 2017-10-31 | End: 2017-10-31 | Stop reason: HOSPADM

## 2017-10-31 RX ORDER — FENTANYL CITRATE 50 UG/ML
INJECTION, SOLUTION INTRAMUSCULAR; INTRAVENOUS
Status: DISCONTINUED | OUTPATIENT
Start: 2017-10-31 | End: 2017-10-31

## 2017-10-31 RX ORDER — PROPOFOL 10 MG/ML
VIAL (ML) INTRAVENOUS
Status: DISCONTINUED | OUTPATIENT
Start: 2017-10-31 | End: 2017-10-31

## 2017-10-31 RX ADMIN — PROPOFOL 40 MG: 10 INJECTION, EMULSION INTRAVENOUS at 01:10

## 2017-10-31 RX ADMIN — SODIUM CHLORIDE: 0.9 INJECTION, SOLUTION INTRAVENOUS at 01:10

## 2017-10-31 RX ADMIN — LIDOCAINE HYDROCHLORIDE 50 MG: 20 INJECTION, SOLUTION INTRAVENOUS at 01:10

## 2017-10-31 RX ADMIN — FENTANYL CITRATE 100 MCG: 50 INJECTION, SOLUTION INTRAMUSCULAR; INTRAVENOUS at 01:10

## 2017-10-31 NOTE — ANESTHESIA PREPROCEDURE EVALUATION
10/31/2017  Marva He is a 47 y.o., female for EGD under MAC    Anesthesia Evaluation     I have reviewed the Nursing Notes.   I have reviewed the Medications.     Review of Systems  Social:  Alcohol Use, Non-Smoker   Cardiovascular:  Cardiovascular Normal Exercise tolerance: good     Pulmonary:  Pulmonary Normal    Psych:   anxiety          Physical Exam  General:  Well nourished       Chest/Lungs:  Chest/Lungs Clear    Heart/Vascular:  Heart Findings: Normal            Anesthesia Plan  Type of Anesthesia, risks & benefits discussed:  Anesthesia Type:  MAC  Patient's Preference:   Intra-op Monitoring Plan:   Intra-op Monitoring Plan Comments:   Post Op Pain Control Plan:   Post Op Pain Control Plan Comments:   Induction:    Beta Blocker:  Patient is not currently on a Beta-Blocker (No further documentation required).       Informed Consent: Patient understands risks and agrees with Anesthesia plan.  Questions answered. Anesthesia consent signed with patient.  ASA Score: 1     Day of Surgery Review of History & Physical:            Ready For Surgery From Anesthesia Perspective.

## 2017-10-31 NOTE — ANESTHESIA POSTPROCEDURE EVALUATION
"Anesthesia Post Evaluation    Patient: Marva He    Procedure(s) Performed: Procedure(s) (LRB):  ESOPHAGOGASTRODUODENOSCOPY (EGD) (N/A)    Final Anesthesia Type: MAC  Patient location during evaluation: GI PACU  Patient participation: Yes- Able to Participate  Level of consciousness: awake and alert and oriented  Post-procedure vital signs: reviewed and not stable  Pain management: adequate  Airway patency: patent  PONV status at discharge: No PONV  Anesthetic complications: no      Cardiovascular status: blood pressure returned to baseline and hemodynamically stable  Respiratory status: spontaneous ventilation and unassisted  Hydration status: euvolemic  Follow-up not needed.        Visit Vitals  BP (!) 112/59   Pulse 68   Temp 36.6 °C (97.9 °F)   Resp 18   Ht 5' 5" (1.651 m)   Wt 55.8 kg (123 lb)   SpO2 97%   Breastfeeding? No   BMI 20.47 kg/m²       Pain/Nam Score: Pain Assessment Performed: Yes (10/31/2017  1:12 PM)  Presence of Pain: complains of pain/discomfort (10/31/2017  1:12 PM)      "

## 2017-10-31 NOTE — H&P (VIEW-ONLY)
Subjective:       Patient ID: Marva He is a 47 y.o. female.    Chief Complaint: Abdominal Pain    Abdominal Pain   This is a chronic problem. The current episode started more than 1 year ago. The problem occurs constantly. The problem has been unchanged. The pain is located in the RUQ. The pain is at a severity of 5/10. The quality of the pain is burning. The abdominal pain radiates to the epigastric region. Associated symptoms include nausea and weight loss (5 Ibs). Pertinent negatives include no arthralgias, fever or vomiting. Exacerbated by: certain food. The pain is relieved by certain positions. She has tried proton pump inhibitors for the symptoms. Prior diagnostic workup includes ultrasound.     Review of Systems   Constitutional: Positive for weight loss (5 Ibs). Negative for appetite change and fever.   HENT: Negative for sore throat and trouble swallowing.    Eyes: Negative for photophobia and visual disturbance.   Respiratory: Negative for wheezing.    Cardiovascular: Negative for chest pain and palpitations.   Gastrointestinal: Positive for abdominal pain and nausea. Negative for vomiting.        See HPI for details   Musculoskeletal: Negative for arthralgias, joint swelling and neck pain.   Skin: Negative for rash and wound.   Neurological: Negative for dizziness, tremors and weakness.   Psychiatric/Behavioral: Negative for behavioral problems and suicidal ideas.       Objective:      Physical Exam   Constitutional: She is oriented to person, place, and time. She appears well-nourished.   HENT:   Mouth/Throat: Oropharynx is clear and moist.   Eyes: Pupils are equal, round, and reactive to light.   Neck: Neck supple.   Cardiovascular: Normal heart sounds.    Pulmonary/Chest: Effort normal and breath sounds normal.   Abdominal: Soft. She exhibits no mass. There is no tenderness. There is no guarding.   Musculoskeletal: Normal range of motion.   Lymphadenopathy:     She has no cervical adenopathy.    Neurological: She is alert and oriented to person, place, and time.   Skin: Skin is warm. No rash noted.   Psychiatric: She has a normal mood and affect.       Assessment:       1. [facility] abdominal pain        Plan:       Marva was seen today for abdominal pain.    Diagnoses and all orders for this visit:    [facility] abdominal pain  -     Case request GI: ESOPHAGOGASTRODUODENOSCOPY (EGD)

## 2017-10-31 NOTE — TRANSFER OF CARE
"Anesthesia Transfer of Care Note    Patient: Marva He    Procedure(s) Performed: Procedure(s) (LRB):  ESOPHAGOGASTRODUODENOSCOPY (EGD) (N/A)    Patient location: PACU    Anesthesia Type: MAC    Post pain: adequate analgesia    Post assessment: no apparent anesthetic complications    Post vital signs: stable    Level of consciousness: awake, alert and oriented    Nausea/Vomiting: no nausea/vomiting    Complications: none    Transfer of care protocol not completed      Last vitals:   Visit Vitals  BP (!) 112/59   Pulse (!) 57   Temp 36.6 °C (97.9 °F)   Resp 20   Ht 5' 5" (1.651 m)   Wt 55.8 kg (123 lb)   SpO2 99%   Breastfeeding? No   BMI 20.47 kg/m²     "

## 2017-10-31 NOTE — DISCHARGE INSTRUCTIONS
Post EGD Discharge Instruction    Marva He  10/31/2017  Emerita Mccracken MD    RESTRICTIONS ON ACTIVITY:    -DO NOT drive a car, operate machinery or make critical decisions, or do activities that require coordination or balance for 24 hours.  -Following Day: Return to full activities including work.  -Diet: Eat and drink normally unless instructed otherwise.    TREATMENT FOR COMMON SIDE EFFECTS:  *Sore Throat - treat with throat lozenges, gargle with warm salt water.  *Mild abdominal pain & bloating- rest and take liquids only.    SYMPTOMS TO WATCH FOR AND REPORT TO YOUR PHYSICIAN:  1. Chills or fever occurring 24 hours after procedure.  2. Pain in chest.  3. SEVERE abdominal pain or bloating.  4. Rectal bleeding which could be maroon or black.    If you have any questions or problems, please call your Physician:    Emerita Mccracken MD Phone:     Lab Results: [phone]    If a complication or emergency situation arises and you are unable to reach your Physician - GO TO THE EMERGENCY ROOM.

## 2017-11-08 ENCOUNTER — TELEPHONE (OUTPATIENT)
Dept: INTERNAL MEDICINE | Facility: CLINIC | Age: 47
End: 2017-11-08

## 2017-11-08 DIAGNOSIS — F41.9 ANXIETY: Chronic | ICD-10-CM

## 2017-11-08 NOTE — TELEPHONE ENCOUNTER
----- Message from Sheeba Hill sent at 11/8/2017  3:39 PM CST -----  Contact: self [phone]  RX request - refill or new RX.  Is this a refill or new RX:  Refill  RX name and strength: Alprazolam (XANAX) 0.5 MG tablet  Directions: Take 1 tablet (0.5 mg total) by mouth 2 (two) times daily as needed for Anxiety. - Oral  Is this a 30 day or 90 day RX:  30  Pharmacy name and phone #: [facility] [phone]  Comments:  Pt is out of town and would like prescription called to diff pharmacy than on file      RX request - refill or new RX.  Is this a refill or new RX:  refill  RX name and strength: Tramadol  Directions:   Is this a 30 day or 90 day RX:  30  Pharmacy name and phone # : [facility] [phone]    Comments:

## 2017-11-09 RX ORDER — ALPRAZOLAM 0.5 MG/1
0.5 TABLET ORAL 2 TIMES DAILY PRN
Qty: 60 TABLET | Refills: 0 | Status: SHIPPED | OUTPATIENT
Start: 2017-11-09 | End: 2018-02-08 | Stop reason: SDUPTHER

## 2017-11-09 RX ORDER — TRAMADOL HYDROCHLORIDE 50 MG/1
50 TABLET ORAL EVERY 6 HOURS PRN
Qty: 30 TABLET | Refills: 0 | Status: SHIPPED | OUTPATIENT
Start: 2017-11-09 | End: 2017-11-19

## 2018-02-08 ENCOUNTER — TELEPHONE (OUTPATIENT)
Dept: INTERNAL MEDICINE | Facility: CLINIC | Age: 48
End: 2018-02-08

## 2018-02-08 DIAGNOSIS — F41.9 ANXIETY: ICD-10-CM

## 2018-02-08 RX ORDER — ALPRAZOLAM 0.5 MG/1
0.5 TABLET ORAL 2 TIMES DAILY PRN
Qty: 60 TABLET | Refills: 0 | Status: SHIPPED | OUTPATIENT
Start: 2018-02-08

## 2018-02-08 RX ORDER — TRAZODONE HYDROCHLORIDE 100 MG/1
100 TABLET ORAL NIGHTLY PRN
Qty: 30 TABLET | Refills: 0 | Status: SHIPPED | OUTPATIENT
Start: 2018-02-08 | End: 2018-09-14

## 2018-02-08 RX ORDER — PANTOPRAZOLE SODIUM 40 MG/1
40 TABLET, DELAYED RELEASE ORAL DAILY
Qty: 30 TABLET | Refills: 0 | Status: SHIPPED | OUTPATIENT
Start: 2018-02-08 | End: 2018-03-10

## 2018-02-08 RX ORDER — FLUOXETINE HYDROCHLORIDE 20 MG/1
20 CAPSULE ORAL DAILY
Qty: 30 CAPSULE | Refills: 0 | Status: SHIPPED | OUTPATIENT
Start: 2018-02-08

## 2018-02-08 RX ORDER — TRAZODONE HYDROCHLORIDE 100 MG/1
TABLET ORAL
Qty: 90 TABLET | Refills: 0 | OUTPATIENT
Start: 2018-02-08

## 2018-02-08 NOTE — TELEPHONE ENCOUNTER
----- Message from Smitha Costello sent at 2/8/2018  8:09 AM CST -----  Contact: patient [phone]  Pt will be out of some of her medications and is working in [place] and is only in [place] on the weekends and holidays. She will be here for the week of 3/19/18 and you will be out of the office. Can you order a 1 month supply since patient will be out of meds before she can get back to [facility] for an office visit.     [facility] [address] [place] [address]   [phone]  Xanax   Trazadone 100mg  Protonics/ generic  Prozac  Pantoprazole

## 2018-02-09 DIAGNOSIS — F41.9 ANXIETY: ICD-10-CM

## 2018-02-09 RX ORDER — ALPRAZOLAM 0.5 MG/1
TABLET ORAL
Qty: 60 TABLET | Refills: 0 | OUTPATIENT
Start: 2018-02-09

## 2018-02-09 NOTE — TELEPHONE ENCOUNTER
----- Message from Sandra Andre sent at 2/9/2018  8:17 AM CST -----  Contact: pt [phone]  RX request - refill or new RX.  Is this a refill or new RX:  refill  RX name and strength: ALPRAZolam (XANAX) 0.5 MG tablet  Directions:   Is this a 30 day or 90 day RX:    Pharmacy name and phone [facility] [address] [place] [address], [phone]  Comments:  Pt would like a call from the nurse

## 2018-03-05 ENCOUNTER — TELEPHONE (OUTPATIENT)
Dept: INTERNAL MEDICINE | Facility: CLINIC | Age: 48
End: 2018-03-05

## 2018-03-05 NOTE — TELEPHONE ENCOUNTER
"----- Message from Nila Bliss sent at 3/5/2018  8:16 AM CST -----  Contact: Self  [phone]  RX request - refill or new RX.  Is this a refill or new RX:  Refill  RX name and strength: ALPRAZolam (XANAX) 0.5 MG tablet  Directions: Take one twice daily   Is this a 30 day or 90 day RX:  30  Pharmacy name and phone # (DON'T enter "on file" or "in chart"): [facility]  [phone] (Phone) [phone] (Fax)    Comments:  Patient is requesting you to add refills to all of these refill request.      RX request - refill or new RX.  Is this a refill or new RX:  Refill  RX name and strength: FLUoxetine (PROZAC) 20 MG capsule  Directions:  Take one in the morning   Is this a 30 day or 90 day RX:  30  Pharmacy name and phone # (DON'T enter "on file" or "in chart"): [facility] above  Comments:        RX request - refill or new RX.  Is this a refill or new RX:  refill  RX name and strength: Pantoprazole (PROTONIX) 40 MG tablet  Directions: Take one in the morning  Is this a 30 day or 90 day RX:  30  Pharmacy name and phone # (DON'T enter "on file" or "in chart"): [facility]   Comments:      RX request - refill or new RX.  Is this a refill or new RX:  Refill  RX name and strength: TraZODone (DESYREL) 100 MG tablet  Directions: take one at night  Is this a 30 day or 90 day RX:  30  Pharmacy name and phone # (DON'T enter "on file" or "in chart"):  [facility]   Comments:          "

## 2018-03-06 ENCOUNTER — TELEPHONE (OUTPATIENT)
Dept: INTERNAL MEDICINE | Facility: CLINIC | Age: 48
End: 2018-03-06

## 2018-03-06 NOTE — TELEPHONE ENCOUNTER
----- Message from Shiela Savage sent at 3/6/2018  1:22 PM CST -----  Contact: Pt [phone]   Pt would like a call back from the nurse regarding getting a list of her medications and ht dosage with MG for each

## 2018-03-06 NOTE — TELEPHONE ENCOUNTER
Left msg we can't email list of meds.  We can call the short list to her or fax if she provides a fax number.    She is only 5 meds.  Her pharmacy can also give her names/ mg etc.

## 2018-03-06 NOTE — TELEPHONE ENCOUNTER
----- Message from Josephine Hurt sent at 3/6/2018  8:31 AM CST -----  Pt Marva He called to advise that because she is going to be working in [place] 5 days a week, she needs to change physicians.  Pt has an appt today and needs a list of her medications emailed to her @ [email or web address]  Please call pt and confirm that we can get the list to her.  Pt can be reached @ [phone]

## 2018-03-06 NOTE — TELEPHONE ENCOUNTER
Glenn left her a msg yesterday she needed an appt for refills.  We can't email a list of her meds.

## 2018-03-06 NOTE — TELEPHONE ENCOUNTER
----- Message from Shiela Savage sent at 3/6/2018  8:24 AM CST -----  Contact: Pt [phone]  Pt would like a call back from the nurse regarding her medications. She would like a list with the MG for her to be able to go to the doctor in [place], [place].

## 2018-03-06 NOTE — TELEPHONE ENCOUNTER
----- Message from Leyda Hou sent at 3/5/2018  4:21 PM CST -----  Contact: Pt  Pt says she just missed a call and no message was left asking if she can get a return call at [phone]    Thanks

## 2018-09-14 RX ORDER — TRAZODONE HYDROCHLORIDE 100 MG/1
TABLET ORAL
Qty: 30 TABLET | Refills: 2 | Status: SHIPPED | OUTPATIENT
Start: 2018-09-14 | End: 2019-06-25 | Stop reason: SDUPTHER

## 2018-10-26 DIAGNOSIS — Z12.39 BREAST CANCER SCREENING: ICD-10-CM

## 2019-02-22 ENCOUNTER — HOSPITAL ENCOUNTER (EMERGENCY)
Facility: HOSPITAL | Age: 49
Discharge: HOME | End: 2019-02-22
Attending: EMERGENCY MEDICINE
Payer: OTHER GOVERNMENT

## 2019-02-22 VITALS
WEIGHT: 120 LBS | DIASTOLIC BLOOD PRESSURE: 75 MMHG | SYSTOLIC BLOOD PRESSURE: 140 MMHG | HEART RATE: 83 BPM | RESPIRATION RATE: 18 BRPM | OXYGEN SATURATION: 99 % | HEIGHT: 65 IN | BODY MASS INDEX: 19.99 KG/M2 | TEMPERATURE: 98 F

## 2019-02-22 DIAGNOSIS — G89.18 POST-OPERATIVE PAIN: Primary | ICD-10-CM

## 2019-02-22 PROCEDURE — 96372 THER/PROPH/DIAG INJ SC/IM: CPT | Mod: 59

## 2019-02-22 PROCEDURE — 99284 EMERGENCY DEPT VISIT MOD MDM: CPT | Mod: 25

## 2019-02-22 PROCEDURE — 63600175 PHARM REV CODE 636 W HCPCS: Performed by: PHYSICIAN ASSISTANT

## 2019-02-22 RX ORDER — HYDROMORPHONE HYDROCHLORIDE 2 MG/ML
0.5 INJECTION, SOLUTION INTRAMUSCULAR; INTRAVENOUS; SUBCUTANEOUS
Status: COMPLETED | OUTPATIENT
Start: 2019-02-22 | End: 2019-02-22

## 2019-02-22 RX ORDER — NAPROXEN 500 MG/1
500 TABLET ORAL 2 TIMES DAILY
COMMUNITY

## 2019-02-22 RX ORDER — OXYCODONE AND ACETAMINOPHEN 5; 325 MG/1; MG/1
1 TABLET ORAL EVERY 4 HOURS PRN
Status: ON HOLD | COMMUNITY
End: 2019-02-24 | Stop reason: HOSPADM

## 2019-02-22 RX ADMIN — HYDROMORPHONE HYDROCHLORIDE 0.5 MG: 2 INJECTION, SOLUTION INTRAMUSCULAR; INTRAVENOUS; SUBCUTANEOUS at 09:02

## 2019-02-23 ENCOUNTER — HOSPITAL ENCOUNTER (OUTPATIENT)
Facility: HOSPITAL | Age: 49
Discharge: HOME | End: 2019-02-24
Attending: EMERGENCY MEDICINE | Admitting: ORTHOPAEDIC SURGERY
Payer: OTHER GOVERNMENT

## 2019-02-23 DIAGNOSIS — R52 INTRACTABLE PAIN: Primary | ICD-10-CM

## 2019-02-23 LAB
ALBUMIN SERPL BCP-MCNC: 3.8 G/DL
ALP SERPL-CCNC: 39 U/L
ALT SERPL W/O P-5'-P-CCNC: 13 U/L
ANION GAP SERPL CALC-SCNC: 6 MMOL/L
AST SERPL-CCNC: 15 U/L
BASOPHILS # BLD AUTO: 0.02 K/UL
BASOPHILS NFR BLD: 0.3 %
BILIRUB SERPL-MCNC: 0.4 MG/DL
BUN SERPL-MCNC: 14 MG/DL
CALCIUM SERPL-MCNC: 9.5 MG/DL
CHLORIDE SERPL-SCNC: 105 MMOL/L
CO2 SERPL-SCNC: 27 MMOL/L
CREAT SERPL-MCNC: 0.8 MG/DL
DIFFERENTIAL METHOD: ABNORMAL
EOSINOPHIL # BLD AUTO: 0.1 K/UL
EOSINOPHIL NFR BLD: 1.1 %
ERYTHROCYTE [DISTWIDTH] IN BLOOD BY AUTOMATED COUNT: 12.5 %
EST. GFR  (AFRICAN AMERICAN): >60 ML/MIN/1.73 M^2
EST. GFR  (NON AFRICAN AMERICAN): >60 ML/MIN/1.73 M^2
GLUCOSE SERPL-MCNC: 93 MG/DL
HCT VFR BLD AUTO: 38.7 %
HGB BLD-MCNC: 12.5 G/DL
LYMPHOCYTES # BLD AUTO: 1.3 K/UL
LYMPHOCYTES NFR BLD: 17.8 %
MCH RBC QN AUTO: 33.8 PG
MCHC RBC AUTO-ENTMCNC: 32.3 G/DL
MCV RBC AUTO: 105 FL
MONOCYTES # BLD AUTO: 0.9 K/UL
MONOCYTES NFR BLD: 11.5 %
NEUTROPHILS # BLD AUTO: 5.2 K/UL
NEUTROPHILS NFR BLD: 69.3 %
PLATELET # BLD AUTO: 189 K/UL
PMV BLD AUTO: 9.4 FL
POTASSIUM SERPL-SCNC: 4.2 MMOL/L
PROT SERPL-MCNC: 6.3 G/DL
RBC # BLD AUTO: 3.7 M/UL
SODIUM SERPL-SCNC: 138 MMOL/L
WBC # BLD AUTO: 7.51 K/UL

## 2019-02-23 PROCEDURE — 63600175 PHARM REV CODE 636 W HCPCS: Performed by: ORTHOPAEDIC SURGERY

## 2019-02-23 PROCEDURE — 99285 EMERGENCY DEPT VISIT HI MDM: CPT | Mod: 25

## 2019-02-23 PROCEDURE — 85025 COMPLETE CBC W/AUTO DIFF WBC: CPT

## 2019-02-23 PROCEDURE — 63600175 PHARM REV CODE 636 W HCPCS: Performed by: NURSE PRACTITIONER

## 2019-02-23 PROCEDURE — G0378 HOSPITAL OBSERVATION PER HR: HCPCS

## 2019-02-23 PROCEDURE — 25000003 PHARM REV CODE 250: Performed by: NURSE PRACTITIONER

## 2019-02-23 PROCEDURE — 80053 COMPREHEN METABOLIC PANEL: CPT

## 2019-02-23 PROCEDURE — 96375 TX/PRO/DX INJ NEW DRUG ADDON: CPT

## 2019-02-23 PROCEDURE — 96374 THER/PROPH/DIAG INJ IV PUSH: CPT

## 2019-02-23 PROCEDURE — 96376 TX/PRO/DX INJ SAME DRUG ADON: CPT

## 2019-02-23 PROCEDURE — 25000003 PHARM REV CODE 250: Performed by: ORTHOPAEDIC SURGERY

## 2019-02-23 RX ORDER — OXYCODONE HYDROCHLORIDE 5 MG/1
10 TABLET ORAL EVERY 6 HOURS PRN
Status: DISCONTINUED | OUTPATIENT
Start: 2019-02-23 | End: 2019-02-24

## 2019-02-23 RX ORDER — NAPROXEN 500 MG/1
500 TABLET ORAL 2 TIMES DAILY
Status: DISCONTINUED | OUTPATIENT
Start: 2019-02-23 | End: 2019-02-24 | Stop reason: HOSPADM

## 2019-02-23 RX ORDER — SODIUM CHLORIDE 0.9 % (FLUSH) 0.9 %
5 SYRINGE (ML) INJECTION
Status: DISCONTINUED | OUTPATIENT
Start: 2019-02-23 | End: 2019-02-24 | Stop reason: HOSPADM

## 2019-02-23 RX ORDER — FAMOTIDINE 20 MG/1
20 TABLET, FILM COATED ORAL 2 TIMES DAILY
Status: DISCONTINUED | OUTPATIENT
Start: 2019-02-23 | End: 2019-02-23

## 2019-02-23 RX ORDER — PANTOPRAZOLE SODIUM 40 MG/1
40 TABLET, DELAYED RELEASE ORAL DAILY
Status: DISCONTINUED | OUTPATIENT
Start: 2019-02-23 | End: 2019-02-24 | Stop reason: HOSPADM

## 2019-02-23 RX ORDER — HYDROMORPHONE HYDROCHLORIDE 2 MG/ML
0.5 INJECTION, SOLUTION INTRAMUSCULAR; INTRAVENOUS; SUBCUTANEOUS
Status: COMPLETED | OUTPATIENT
Start: 2019-02-23 | End: 2019-02-23

## 2019-02-23 RX ORDER — HYDROMORPHONE HYDROCHLORIDE 2 MG/ML
0.5 INJECTION, SOLUTION INTRAMUSCULAR; INTRAVENOUS; SUBCUTANEOUS
Status: DISCONTINUED | OUTPATIENT
Start: 2019-02-23 | End: 2019-02-23

## 2019-02-23 RX ORDER — ASPIRIN 81 MG/1
81 TABLET ORAL DAILY
COMMUNITY

## 2019-02-23 RX ORDER — ACETAMINOPHEN 10 MG/ML
1000 INJECTION, SOLUTION INTRAVENOUS EVERY 8 HOURS
Status: DISCONTINUED | OUTPATIENT
Start: 2019-02-23 | End: 2019-02-24 | Stop reason: HOSPADM

## 2019-02-23 RX ORDER — OXYCODONE AND ACETAMINOPHEN 10; 325 MG/1; MG/1
1 TABLET ORAL EVERY 6 HOURS PRN
Status: DISCONTINUED | OUTPATIENT
Start: 2019-02-23 | End: 2019-02-23

## 2019-02-23 RX ORDER — ASPIRIN 81 MG/1
81 TABLET ORAL DAILY
Status: DISCONTINUED | OUTPATIENT
Start: 2019-02-23 | End: 2019-02-24 | Stop reason: HOSPADM

## 2019-02-23 RX ORDER — FLUOXETINE HYDROCHLORIDE 20 MG/1
20 CAPSULE ORAL DAILY
Status: DISCONTINUED | OUTPATIENT
Start: 2019-02-23 | End: 2019-02-24 | Stop reason: HOSPADM

## 2019-02-23 RX ORDER — AMOXICILLIN 250 MG
1 CAPSULE ORAL 2 TIMES DAILY
Status: DISCONTINUED | OUTPATIENT
Start: 2019-02-23 | End: 2019-02-24 | Stop reason: HOSPADM

## 2019-02-23 RX ORDER — VALACYCLOVIR HYDROCHLORIDE 500 MG/1
500 TABLET, FILM COATED ORAL DAILY
Status: DISCONTINUED | OUTPATIENT
Start: 2019-02-23 | End: 2019-02-24 | Stop reason: HOSPADM

## 2019-02-23 RX ORDER — TRAZODONE HYDROCHLORIDE 50 MG/1
50 TABLET ORAL NIGHTLY PRN
Status: DISCONTINUED | OUTPATIENT
Start: 2019-02-23 | End: 2019-02-24 | Stop reason: HOSPADM

## 2019-02-23 RX ORDER — ONDANSETRON 2 MG/ML
4 INJECTION INTRAMUSCULAR; INTRAVENOUS
Status: COMPLETED | OUTPATIENT
Start: 2019-02-23 | End: 2019-02-23

## 2019-02-23 RX ORDER — MORPHINE SULFATE 10 MG/ML
6 INJECTION INTRAMUSCULAR; INTRAVENOUS; SUBCUTANEOUS EVERY 4 HOURS PRN
Status: DISCONTINUED | OUTPATIENT
Start: 2019-02-23 | End: 2019-02-24 | Stop reason: HOSPADM

## 2019-02-23 RX ORDER — ONDANSETRON 8 MG/1
8 TABLET, ORALLY DISINTEGRATING ORAL EVERY 8 HOURS PRN
Status: DISCONTINUED | OUTPATIENT
Start: 2019-02-23 | End: 2019-02-24 | Stop reason: HOSPADM

## 2019-02-23 RX ORDER — OXYCODONE HYDROCHLORIDE 15 MG/1
15 TABLET ORAL EVERY 6 HOURS PRN
Status: DISCONTINUED | OUTPATIENT
Start: 2019-02-23 | End: 2019-02-24

## 2019-02-23 RX ADMIN — OXYCODONE AND ACETAMINOPHEN 1 TABLET: 10; 325 TABLET ORAL at 03:02

## 2019-02-23 RX ADMIN — PROMETHAZINE HYDROCHLORIDE 6.25 MG: 25 INJECTION INTRAMUSCULAR; INTRAVENOUS at 09:02

## 2019-02-23 RX ADMIN — ONDANSETRON 4 MG: 2 INJECTION INTRAMUSCULAR; INTRAVENOUS at 11:02

## 2019-02-23 RX ADMIN — SENNOSIDES AND DOCUSATE SODIUM 1 TABLET: 8.6; 5 TABLET ORAL at 08:02

## 2019-02-23 RX ADMIN — ASPIRIN 81 MG: 81 TABLET, COATED ORAL at 04:02

## 2019-02-23 RX ADMIN — MORPHINE SULFATE 6 MG: 10 INJECTION INTRAVENOUS at 05:02

## 2019-02-23 RX ADMIN — OXYCODONE HYDROCHLORIDE 15 MG: 15 TABLET ORAL at 08:02

## 2019-02-23 RX ADMIN — HYDROMORPHONE HYDROCHLORIDE 0.5 MG: 2 INJECTION, SOLUTION INTRAMUSCULAR; INTRAVENOUS; SUBCUTANEOUS at 11:02

## 2019-02-23 RX ADMIN — ACETAMINOPHEN 1000 MG: 10 INJECTION, SOLUTION INTRAVENOUS at 09:02

## 2019-02-23 RX ADMIN — NAPROXEN 500 MG: 500 TABLET ORAL at 08:02

## 2019-02-23 RX ADMIN — HYDROMORPHONE HYDROCHLORIDE 0.5 MG: 2 INJECTION INTRAMUSCULAR; INTRAVENOUS; SUBCUTANEOUS at 12:02

## 2019-02-23 RX ADMIN — PANTOPRAZOLE SODIUM 40 MG: 40 TABLET, DELAYED RELEASE ORAL at 04:02

## 2019-02-23 NOTE — ED TRIAGE NOTES
Pt reports having ACL surgery on LEFT leg yesterday AM. Pt seen in ED last night because the pain was uncontrollable with medication. Pt uses crutches at home. Pain is 10/10. Pt took percocet and naproxen at 0700 this AM (relief for about 2 hours)

## 2019-02-23 NOTE — ED PROVIDER NOTES
Encounter Date: 2/22/2019  SORT:   47 y/o female with history of anxiety presents for evaluation of severe L knee pain 10/10 with numbness to L toes s/p  ACL repair in [place] with Dr. Pham this morning and was discharged at 1530 and came straight here because her hsuband lives here. Initial orders placed. YUMIKO Larson PA-C       SCRIBE #1 NOTE: I, Jessica Hinds, tej scribing for, and in the presence of,  Iban Miner PA-C. I have scribed the following portions of the note - Other sections scribed: HPI/ROS.       History     Chief Complaint   Patient presents with    Knee Pain     States she had surgery on her left knee in [place] this morning and had a nerve block that has worn off and has taken multiple percocet's in the last 4 hours and isn't helping her pain      CC: Knee Pain     HPI: This 48 y.o. female presents to the ED for an emergent evaluation of L, anterior knee pain s/p ACL repair this morning in [place]. Rates as 10/10. She tore ACL on 1/18/19. States she received 1 nerve block prior and 1 nerve block after surgery. States her L foot is still numb. Reports taking Percocet 2x around 2:30 PM before leaving facility and another 2 around 4:20 PM. Then, she took Naproxen around 5:30 PM because she was still in pain. She is in a knee immobilizer. Her next appointment with orthopedic surgeon is on 3/7/19. No alleviating factors. Otherwise, pt denies fever, chills, n/v/d, leg swelling, and any other associated symptoms.      The history is provided by the patient. No  was used.     Review of patient's allergies indicates:  No Known Allergies  Past Medical History:   Diagnosis Date    Anxiety     Herpes     IBS (irritable bowel syndrome)      Past Surgical History:   Procedure Laterality Date    APPENDECTOMY      at the age of 9     BREAST SURGERY      ESOPHAGOGASTRODUODENOSCOPY (EGD) N/A 10/31/2017    [facility] at [facility]    HYSTERECTOMY       KNEE SURGERY Left 02/22/2019     Family History   Problem Relation Age of Onset    Arthritis Mother     No Known Problems Father     No Known Problems Sister     No Known Problems Brother     No Known Problems Daughter     No Known Problems Son      Social History     Tobacco Use    Smoking status: Never Smoker    Smokeless tobacco: Never Used   Substance Use Topics    Alcohol use: Yes     Comment: socially    Drug use: No     Review of Systems   Constitutional: Negative for chills and fever.   HENT: Negative for congestion, ear pain, rhinorrhea and sore throat.    Eyes: Negative for pain and visual disturbance.   Respiratory: Negative for cough and shortness of breath.    Cardiovascular: Negative for chest pain.   Gastrointestinal: Negative for abdominal pain, diarrhea, nausea and vomiting.   Genitourinary: Negative for dysuria.   Musculoskeletal: Positive for arthralgias (L knee). Negative for back pain and neck pain.   Skin: Negative for rash.   Neurological: Negative for headaches.   All other systems reviewed and are negative.      Physical Exam     Initial Vitals [02/22/19 2001]   BP Pulse Resp Temp SpO2   (!) 140/75 83 18 98.1 °F (36.7 °C) 99 %      MAP       --         Physical Exam    Nursing note and vitals reviewed.  Constitutional: She appears well-developed and well-nourished. No distress.   HENT:   Head: Normocephalic and atraumatic.   Eyes: EOM are normal. Pupils are equal, round, and reactive to light.   Neck: Normal range of motion. Neck supple.   Cardiovascular: Normal rate, regular rhythm and normal heart sounds. Exam reveals no gallop and no friction rub.    No murmur heard.  Pulses:       Dorsalis pedis pulses are 2+ on the right side, and 2+ on the left side.        Posterior tibial pulses are 2+ on the right side, and 2+ on the left side.   Pulmonary/Chest: Breath sounds normal. No respiratory distress. She has no wheezes. She has no rhonchi. She has no rales.   Abdominal: Soft.  Bowel sounds are normal. There is no tenderness. There is no rebound and no guarding.   Musculoskeletal:        Left knee: She exhibits decreased range of motion (Secondary to pain). She exhibits no effusion, no ecchymosis and no deformity.   There is a knee immobilizer in place over the left knee.  There is limited range of motion of the left knee secondary to pain.  There are 2 small surgical incisions to the left anterior knee and 1 small surgical incision to the left lateral knee.  No warmth, erythema or significant edema.  All compartments are soft.  No calf tenderness.   Neurological: She is alert and oriented to person, place, and time. No sensory deficit.   Skin: Skin is warm and dry.   Psychiatric: She has a normal mood and affect.         ED Course   Procedures  Labs Reviewed - No data to display       Imaging Results    None          Medical Decision Making:   ED Management:  This is an evaluation of a 48 y.o. female who presents to the ED for left knee pain status post ACL repair this a.m..  Vital signs are stable.   Afebrile.  Patient is nontoxic appearing and in no acute distress. There is a knee immobilizer in place to the left knee.  Range of motion of the left knee is limited secondary to pain. After removal of the bandages, there are 3 small surgical incisions to the left knee without warmth, erythema or significant edema. All compartments are soft.  No calf tenderness. I considered but doubt cellulitis, compartment syndrome, DVT.  Etiology likely postoperative pain. Patient treated the emergency department with 0.5 mg of Dilaudid with significant improvement of pain. Patient discharged home and instructed follow up with her orthopedic surgeon.    Patient given return precautions and instructed to return to the emergency department for any new or worsening symptoms. Patient verbalized understanding and agreed with plan.     I discussed the case with Dr. Denton who is in agreement with my  assessment and plan.              Scribe Attestation:   Scribe #1: I performed the above scribed service and the documentation accurately describes the services I performed. I attest to the accuracy of the note.    Attending Attestation:           Physician Attestation for Scribe:  Physician Attestation Statement for Scribe #1: I, Iban Miner PA-C, reviewed documentation, as scribed by Jessica Hinds in my presence, and it is both accurate and complete.                    Clinical Impression:       ICD-10-CM ICD-9-CM   1. Post-operative pain G89.18 338.18                                Iban Miner PA-C  02/23/19 0603

## 2019-02-23 NOTE — ED PROVIDER NOTES
Encounter Date: 2/23/2019       History     Chief Complaint   Patient presents with    Knee Pain     ACL surgery last night on left knee; took pain medicine this morning but pain is unbearable; not able to stand; using crutches     Chief complaint:  Left knee pain    History of present illness:  Patient is a 48-year-old female who underwent recent ACL repair for an ACL tear yesterday.  She presents now for pain that she reports is 10/10 severity.  She states that she was here yesterday for pain control as well as a Percocet 5/325 tablets she was given for pain control not been effective.  She was operated upon an outside facility in [facility] yesterday.  She denies distal numbness, tingling.      The history is provided by the patient. No  was used.     Review of patient's allergies indicates:  No Known Allergies  Past Medical History:   Diagnosis Date    Anxiety     Herpes     IBS (irritable bowel syndrome)      Past Surgical History:   Procedure Laterality Date    APPENDECTOMY      at the age of 9     BREAST SURGERY      ESOPHAGOGASTRODUODENOSCOPY (EGD) N/A 10/31/2017    [facility]    HYSTERECTOMY      KNEE SURGERY Left 02/22/2019     Family History   Problem Relation Age of Onset    Arthritis Mother     No Known Problems Father     No Known Problems Sister     No Known Problems Brother     No Known Problems Daughter     No Known Problems Son      Social History     Tobacco Use    Smoking status: Never Smoker    Smokeless tobacco: Never Used   Substance Use Topics    Alcohol use: Yes     Comment: socially    Drug use: No     Review of Systems   Constitutional: Negative for chills, fatigue and fever.   HENT: Negative for congestion, ear discharge, ear pain, postnasal drip, rhinorrhea, sinus pressure, sneezing, sore throat and voice change.    Eyes: Negative for discharge and itching.   Respiratory: Negative for cough, shortness of  breath and wheezing.    Cardiovascular: Negative for chest pain, palpitations and leg swelling.   Gastrointestinal: Negative for abdominal pain, constipation, diarrhea, nausea and vomiting.   Endocrine: Negative for polydipsia, polyphagia and polyuria.   Genitourinary: Negative for dysuria, frequency, hematuria, urgency, vaginal bleeding, vaginal discharge and vaginal pain.   Musculoskeletal: Positive for arthralgias. Negative for myalgias.   Skin: Negative for rash and wound.   Neurological: Negative for dizziness, seizures, syncope, weakness and numbness.   Hematological: Negative for adenopathy. Does not bruise/bleed easily.   Psychiatric/Behavioral: Negative for self-injury and suicidal ideas. The patient is not nervous/anxious.        Physical Exam     Initial Vitals [02/23/19 1043]   BP Pulse Resp Temp SpO2   115/68 78 20 98.2 °F (36.8 °C) 98 %      MAP       --         Physical Exam    Nursing note and vitals reviewed.  Constitutional: She appears well-developed and well-nourished.   HENT:   Head: Normocephalic and atraumatic.   Right Ear: External ear normal.   Left Ear: External ear normal.   Nose: Nose normal.   Eyes: Conjunctivae and EOM are normal. Pupils are equal, round, and reactive to light. Right eye exhibits no discharge. Left eye exhibits no discharge.   Neck: Normal range of motion.   Abdominal: She exhibits no distension.   Musculoskeletal: Normal range of motion.   Left knee in hinged brace.  Distal pulse, sensation and movement are intact.   Neurological: She is alert and oriented to person, place, and time.   Skin: Skin is dry. Capillary refill takes less than 2 seconds.         ED Course   Procedures  Labs Reviewed   CBC W/ AUTO DIFFERENTIAL - Abnormal; Notable for the following components:       Result Value    RBC 3.70 (*)      (*)     MCH 33.8 (*)     Lymph% 17.8 (*)     All other components within normal limits   COMPREHENSIVE METABOLIC PANEL - Abnormal; Notable for the following  components:    Alkaline Phosphatase 39 (*)     Anion Gap 6 (*)     All other components within normal limits          Imaging Results    None                APC / Resident Notes:   Initial assessment:  48-year-old female with postsurgical pain    Differential diagnosis includes compartment syndrome, sepsis, postsurgical pain    Orders included IV placement Dilaudid 0.5 mg IV, consult orthopedics for possible admit for intractible pain.              ED Course as of Feb 23 1435   Sat Feb 23, 2019   1200 BP: 115/68 [VC]   1200 Temp: 98.2 °F (36.8 °C) [VC]   1200 Temp src: Oral [VC]   1200 Pulse: 78 [VC]   1200 Resp: 20 [VC]   1200 CBC: leukocyte count was normal, the H&H was normal. The platelet count was normal.    The chemistry was negative for hypo-or hyper natremia, kalemia, chloridemia, or other electrolyte abnormalities; BUN and creatinine were within normal limits indicating normal kidney function, ALT and AST were within normal limits indicating normal liver function, there was no transaminitis.         SpO2: 98 % [VC]   1246 BP: 119/63 [VC]   1246 Temp: 98.5 °F (36.9 °C) [VC]   1246 Temp src: Oral [VC]   1246 Pulse: 65 [VC]   1246 Resp: 20 [VC]   1246 SpO2: 96 % [VC]   1255 Dr. Corral given SBAR reports he is on the way.  [VC]   1331 Dr. Corral is at the bedside.  [VC]   1344 Dr. Corral here performing independent assessment.    [VC]      ED Course User Index  [VC] Cortez Vernon DNP     Clinical Impression:       ICD-10-CM ICD-9-CM   1. Intractable pain R52 780.96         Disposition:   Disposition: Placed in Observation  Condition: Stable                        Cortez Vernon DNP  02/23/19 1440

## 2019-02-23 NOTE — NURSING
Called Dr Corral about patient's complaint of ER giving her the same medication as she took at home that didn't work, and discontinuing IV pain medication patient stated if I would have known they were going to do this I would have gone home and if they don't change it I will go home, Dr Corral stated he is in surgery and when he's done he will come change her orders, Patient updated

## 2019-02-23 NOTE — ED TRIAGE NOTES
Pt. Reports she fell on MLK day 2019, had surgery to her left knee due to ACL being damaged. Pt. Reports she had surgery on today in [place]  and was give a block. Pt. States she took 2 percocet 5/325 after d/c and 2 more on 430 PM. Pt. Reports she cont to have pain and took 2 naproxen at 530 pm. Pt. States her pain is 10/10.

## 2019-02-23 NOTE — PLAN OF CARE
Problem: Adult Inpatient Plan of Care  Goal: Plan of Care Review  Oriented patient to environment and plan of care, hourly rounding, white board patient verbalized understanding,side rails upx2, bed low, call bell in reach, VSS, NAD. Patient's left lower extremity elevated on pillow, Ice to be placed every 4 hours to affected area, patient instructed on calling staff for assistance and not getting oob by herself patient verbalized understanding   02/23/19 0434   Plan of Care Review   Plan of Care Reviewed With patient

## 2019-02-24 VITALS
WEIGHT: 120 LBS | BODY MASS INDEX: 19.99 KG/M2 | SYSTOLIC BLOOD PRESSURE: 118 MMHG | HEIGHT: 65 IN | OXYGEN SATURATION: 96 % | RESPIRATION RATE: 18 BRPM | TEMPERATURE: 98 F | HEART RATE: 60 BPM | DIASTOLIC BLOOD PRESSURE: 59 MMHG

## 2019-02-24 LAB
ALBUMIN SERPL BCP-MCNC: 3.3 G/DL
ALP SERPL-CCNC: 37 U/L
ALT SERPL W/O P-5'-P-CCNC: 12 U/L
ANION GAP SERPL CALC-SCNC: 7 MMOL/L
AST SERPL-CCNC: 14 U/L
BASOPHILS # BLD AUTO: 0.03 K/UL
BASOPHILS NFR BLD: 0.6 %
BILIRUB SERPL-MCNC: 0.2 MG/DL
BUN SERPL-MCNC: 12 MG/DL
CALCIUM SERPL-MCNC: 8.5 MG/DL
CHLORIDE SERPL-SCNC: 105 MMOL/L
CO2 SERPL-SCNC: 26 MMOL/L
CREAT SERPL-MCNC: 0.7 MG/DL
DIFFERENTIAL METHOD: ABNORMAL
EOSINOPHIL # BLD AUTO: 0.2 K/UL
EOSINOPHIL NFR BLD: 3.2 %
ERYTHROCYTE [DISTWIDTH] IN BLOOD BY AUTOMATED COUNT: 12.6 %
EST. GFR  (AFRICAN AMERICAN): >60 ML/MIN/1.73 M^2
EST. GFR  (NON AFRICAN AMERICAN): >60 ML/MIN/1.73 M^2
GLUCOSE SERPL-MCNC: 88 MG/DL
HCT VFR BLD AUTO: 36.9 %
HGB BLD-MCNC: 11.9 G/DL
LYMPHOCYTES # BLD AUTO: 1.5 K/UL
LYMPHOCYTES NFR BLD: 29.8 %
MCH RBC QN AUTO: 34.1 PG
MCHC RBC AUTO-ENTMCNC: 32.2 G/DL
MCV RBC AUTO: 106 FL
MONOCYTES # BLD AUTO: 0.7 K/UL
MONOCYTES NFR BLD: 12.8 %
NEUTROPHILS # BLD AUTO: 2.7 K/UL
NEUTROPHILS NFR BLD: 53.6 %
PLATELET # BLD AUTO: 172 K/UL
PMV BLD AUTO: 9.6 FL
POTASSIUM SERPL-SCNC: 4.2 MMOL/L
PROT SERPL-MCNC: 5.6 G/DL
RBC # BLD AUTO: 3.49 M/UL
SODIUM SERPL-SCNC: 138 MMOL/L
WBC # BLD AUTO: 5.07 K/UL

## 2019-02-24 PROCEDURE — 25000003 PHARM REV CODE 250: Performed by: ORTHOPAEDIC SURGERY

## 2019-02-24 PROCEDURE — 80053 COMPREHEN METABOLIC PANEL: CPT

## 2019-02-24 PROCEDURE — 63600175 PHARM REV CODE 636 W HCPCS: Performed by: ORTHOPAEDIC SURGERY

## 2019-02-24 PROCEDURE — 96375 TX/PRO/DX INJ NEW DRUG ADDON: CPT

## 2019-02-24 PROCEDURE — 85025 COMPLETE CBC W/AUTO DIFF WBC: CPT

## 2019-02-24 PROCEDURE — 96376 TX/PRO/DX INJ SAME DRUG ADON: CPT

## 2019-02-24 PROCEDURE — G0378 HOSPITAL OBSERVATION PER HR: HCPCS

## 2019-02-24 PROCEDURE — 25000003 PHARM REV CODE 250: Performed by: NURSE PRACTITIONER

## 2019-02-24 PROCEDURE — 97161 PT EVAL LOW COMPLEX 20 MIN: CPT

## 2019-02-24 PROCEDURE — 36415 COLL VENOUS BLD VENIPUNCTURE: CPT

## 2019-02-24 RX ORDER — DIPHENHYDRAMINE HCL 25 MG
25 CAPSULE ORAL EVERY 6 HOURS PRN
Status: DISCONTINUED | OUTPATIENT
Start: 2019-02-24 | End: 2019-02-24 | Stop reason: HOSPADM

## 2019-02-24 RX ORDER — OXYCODONE AND ACETAMINOPHEN 7.5; 325 MG/1; MG/1
1-2 TABLET ORAL EVERY 4 HOURS PRN
Qty: 40 TABLET | Refills: 0 | Status: SHIPPED | OUTPATIENT
Start: 2019-02-24 | End: 2019-03-06

## 2019-02-24 RX ORDER — OXYCODONE HYDROCHLORIDE 15 MG/1
15 TABLET ORAL EVERY 4 HOURS PRN
Status: DISCONTINUED | OUTPATIENT
Start: 2019-02-24 | End: 2019-02-24 | Stop reason: HOSPADM

## 2019-02-24 RX ORDER — DIPHENHYDRAMINE HYDROCHLORIDE 50 MG/ML
25 INJECTION INTRAMUSCULAR; INTRAVENOUS ONCE
Status: COMPLETED | OUTPATIENT
Start: 2019-02-24 | End: 2019-02-24

## 2019-02-24 RX ADMIN — PANTOPRAZOLE SODIUM 40 MG: 40 TABLET, DELAYED RELEASE ORAL at 08:02

## 2019-02-24 RX ADMIN — DIPHENHYDRAMINE HYDROCHLORIDE 25 MG: 50 INJECTION INTRAMUSCULAR; INTRAVENOUS at 10:02

## 2019-02-24 RX ADMIN — ASPIRIN 81 MG: 81 TABLET, COATED ORAL at 08:02

## 2019-02-24 RX ADMIN — ACETAMINOPHEN 1000 MG: 10 INJECTION, SOLUTION INTRAVENOUS at 05:02

## 2019-02-24 RX ADMIN — VALACYCLOVIR HYDROCHLORIDE 500 MG: 500 TABLET, FILM COATED ORAL at 08:02

## 2019-02-24 RX ADMIN — NAPROXEN 500 MG: 500 TABLET ORAL at 08:02

## 2019-02-24 RX ADMIN — OXYCODONE HYDROCHLORIDE 15 MG: 15 TABLET ORAL at 08:02

## 2019-02-24 RX ADMIN — FLUOXETINE 20 MG: 20 CAPSULE ORAL at 08:02

## 2019-02-24 RX ADMIN — OXYCODONE HYDROCHLORIDE 15 MG: 15 TABLET ORAL at 04:02

## 2019-02-24 RX ADMIN — SENNOSIDES AND DOCUSATE SODIUM 1 TABLET: 8.6; 5 TABLET ORAL at 08:02

## 2019-02-24 RX ADMIN — MORPHINE SULFATE 6 MG: 10 INJECTION INTRAVENOUS at 01:02

## 2019-02-24 NOTE — PROGRESS NOTES
Pt in room ambulatory          Temp:  [97.7 °F (36.5 °C)-98.5 °F (36.9 °C)] 97.9 °F (36.6 °C)  Pulse:  [60-69] 60  Resp:  [18-20] 18  SpO2:  [92 %-98 %] 96 %  BP: (105-130)/(51-73) 118/59        PE:    Moving R foot better without pain, no drainage from knee      Recent Labs   Lab 02/24/19  0357   WBC 5.07   RBC 3.49*   HGB 11.9*   HCT 36.9*      *   MCH 34.1*   MCHC 32.2         Current Facility-Administered Medications:     acetaminophen (10 mg/mL) injection 1,000 mg, 1,000 mg, Intravenous, Q8H, Lucero Corral III, MD, Last Rate: 400 mL/hr at 02/24/19 0538, 1,000 mg at 02/24/19 0538    aspirin EC tablet 81 mg, 81 mg, Oral, Daily, Cortez Vernon DNP, 81 mg at 02/24/19 0829    diphenhydrAMINE capsule 25 mg, 25 mg, Oral, Q6H PRN, Lucero Corral III, MD    FLUoxetine capsule 20 mg, 20 mg, Oral, Daily, Cortez Vernon DNP, 20 mg at 02/24/19 0829    influenza (FLUZONE QUADRIVALENT) vaccine 0.5 mL, 0.5 mL, Intramuscular, vaccine x 1 dose, Lucero Corral III, MD    linaclotide capsule 145 mcg, 145 mcg, Oral, Daily, Cortez Vernon DNP    morphine injection 6 mg, 6 mg, Intravenous, Q4H PRN, Lucero Corral III, MD, 6 mg at 02/24/19 0105    naproxen tablet 500 mg, 500 mg, Oral, BID, Cortez Vernon DNP, 500 mg at 02/24/19 0829    ondansetron disintegrating tablet 8 mg, 8 mg, Oral, Q8H PRN, Cortez Vernon DNP    oxyCODONE immediate release tablet 15 mg, 15 mg, Oral, Q4H PRN, Lucero Corral III, MD    pantoprazole EC tablet 40 mg, 40 mg, Oral, Daily, Cortez Vernon DNP, 40 mg at 02/24/19 0829    promethazine (PHENERGAN) 6.25 mg in dextrose 5 % 50 mL IVPB, 6.25 mg, Intravenous, Q6H PRN, Cortez Vernon DNP, Last Rate: 150 mL/hr at 02/23/19 2158, 6.25 mg at 02/23/19 2158    senna-docusate 8.6-50 mg per tablet 1 tablet, 1 tablet, Oral, BID, Cortez Vernon DNP, 1 tablet at 02/24/19 0829    sodium chloride 0.9% flush 5 mL, 5 mL, Intravenous, PRN, Cortez VICTOR  Champagne, DNP    traZODone tablet 50 mg, 50 mg, Oral, Nightly PRN, Cortez Vernon DNP    valACYclovir tablet 500 mg, 500 mg, Oral, Daily, Cortez Vernon DNP, 500 mg at 02/24/19 0829      A/P:    L knee ACL recon, post op pain    Xray looks OK    Improved    D/c home on percocet 7.5    outpatient PT scheduled next week    Plans to f/u with surgeon on [place]

## 2019-02-24 NOTE — PLAN OF CARE
Patient will follow-up with surgeon in [place], [place]. SW informed nurse Dominique patient is clear to discharge from case management standpoint.         02/24/19 1145   Final Note   Assessment Type Final Discharge Note   Anticipated Discharge Disposition Home   Right Care Referral Info   Post Acute Recommendation No Care

## 2019-02-24 NOTE — H&P
CHIEF COMPLAINT:  Left knee intractable pain following ACL reconstruction.    HISTORY OF PRESENT ILLNESS:  The patient is a 48-year-old female with a history   of a left knee ACL reconstruction and chondroplasty of medial femoral condyle   fluoroscopic technique with allograft bone-tendon-bone.  She had surgery   yesterday in [place] at the ____ [facility] and was coming here to recuperate.    However, she had intractable pain and went to the Emergency Room yesterday ____    treated with Dilaudid and she was having uncontrolled pain again today ____   back to the Emergency Room and was treated again with Dilaudid.  I have asked to   admit her for pain control.    PAST MEDICAL HISTORY:  Herpes and irritable bowel syndrome.    PAST SURGICAL HISTORY:  Appendectomy, breast surgery, ____ hysterectomy and a   left knee ACL reconstruction yesterday.    FAMILY HISTORY:  ____ for arthritis.    SOCIAL HISTORY:  Reveals she occasionally consumes alcohol.  She denies drug   use.  She is not a smoker.  She has no known drug allergies.  She was given   Percocet 5 mg for pain.  She took of those and has not helped.  She complained   of pain at 10/10 intensity in the knee itself, mostly anteriorly.    PHYSICAL EXAMINATION:  HEART:  Regular rate and rhythm.  CHEST:  Clear.  ABDOMEN:  Soft and nontender.  EXTREMITIES:  Her right leg is unremarkable.  Left leg has a dressing in place.    Dressing is removed.  She has an arthroscopy portal anteromedially and   anterolaterally and an inferior medial and superior lateral wounds from the ACL   reconstruction.  There is trace effusion ____ anything in her knee.  There is no   evidence of hemarthrosis.  There is no erythema.  There is no drainage from the   wounds.  She did have a postoperative nerve block, which is wearing off.  She   reports she can move her foot more than before.  She has plantarflexion in the   toes and her foot, but not much dorsiflexion.  Sensible light touch is    improving.  Compartments are soft and nontender.  She has not had significant   pain with passive range of motion and really no pain with range of motion of the   foot and the calf she complains of some pain with range of motion with knee   flexion and 30 degrees.  Lachman test is still stable.  We will place her back   into her brace, so as to lock it from 0-10 degrees by her report ad was marked   as such ____ replaced back on her leg as well.  Her white count is normal.  She   is not running any fever.    ASSESSMENT:  This is a 48-year-old female with a left anterior cruciate ligament   reconstruction and intractable pain.  She came to the Emergency Room in two   visits, admitted for pain control and physical therapy and hopefully get her   discharge tomorrow.  I will order an x-ray of her left knee.      MOR  dd: 02/23/2019 16:20:57 (CST)  td: 02/24/2019 04:06:44 (CST)  Doc ID   #[id number]  Job ID #[id number]    CC:

## 2019-02-24 NOTE — PT/OT/SLP EVAL
Physical Therapy Evaluation and Discharge Note    Patient Name:  Marva He   MRN:  [id number]    Recommendations:     Discharge Recommendations:  (home)   Discharge Equipment Recommendations: none   Barriers to discharge: None    Assessment:     Marva He is a 48 y.o. female admitted with a medical diagnosis of <principal problem not specified>.  At this time, patient is functioning at their prior level of function and does not require further acute PT services.     Recent Surgery: * No surgery found *  ; LLE ACL reconstruction on 2/22/19 in [place], [place]    Plan:     During this hospitalization, patient does not require further acute PT services.  Please re-consult if situation changes.      Subjective     Chief Complaint: Pt reported numbness to LLE.  Patient/Family Comments/goals: Pt reported starting outpatient PT services on March 4, 2019 in [place].    Pain/Comfort:  · Pain Rating 1: 4/10  · Location - Side 1: Left  · Location 1: knee  · Pain Addressed 1: Pre-medicate for activity  · Pain Rating Post-Intervention 1: 5/10    Living Environment:  Pt lives with spouse in a town house with no concerns at entry.  Pt's bedroom/full bathroom are on the 1st floor.   Prior to admission, patients level of function was independent.  Equipment used at home: crutches, axillary.  Upon discharge, patient will have assistance from spouse.    Objective:     Patient found in bed upon PT entry to room found with: peripheral IV     General Precautions: Standard, fall   Orthopedic Precautions:LLE weight bearing as tolerated   Braces: Hinged knee brace     Exams:  · Cognitive Exam:  Patient was able to follow multiple commands.   · Gross Motor Coordination:  WFL  · Postural Exam:  Patient presented with the following abnormalities:    · -       No postural abnormalities identified  · Sensation:    · -       Intact  light/touch BLE, however pt still with c/o numbness to LLE from block after sx on 2/22/19.  · Skin Integrity/Edema:       · -       Skin integrity: Visible skin intact  · -       Edema: Mild LLE  · BUE ROM: WNL  · BUE Strength: WNL  · RLE ROM: WNL  · RLE Strength: WNL  · LLE ROM: WFL except hinged knee brace lock in extension  · LLE Strength: WFL    Functional Mobility:  · Bed Mobility:     · Scooting: modified independence  · Supine to Sit: modified independence with HOB elevated  · Sit to Supine: modified independence with HOB elevated   · Transfers:     · Sit to Stand:  modified independence with axillary crutches  · Gait: Pt ambulated ~100 ft with mod I using B axillary crutches and LLE hinged knee brace.  Pt with 3 pt gait.    · Balance: Pt with good balance.     AM-PAC 6 CLICK MOBILITY  Total Score:23       Therapeutic Activities and Exercises:  Pt educated on the elevation and icing of LLE.  Pt educated on LLE AP, SLR, and hip abd/add in bed.  Pt verbalized good understanding.     Patient left HOB elevated and LLE elevated on pillow with ice placed with all lines intact, call button in reach, nurse Vale notified and spouse present.    GOALS:   Multidisciplinary Problems     Physical Therapy Goals     Not on file                History:     Past Medical History:   Diagnosis Date    Anxiety     Herpes     IBS (irritable bowel syndrome)        Past Surgical History:   Procedure Laterality Date    APPENDECTOMY      at the age of 9     BREAST SURGERY      ESOPHAGOGASTRODUODENOSCOPY (EGD) N/A 10/31/2017    Performed by Emerita Mccracken [facility] at [facility]    HYSTERECTOMY      KNEE SURGERY Left 02/22/2019       Time Tracking:     PT Received On: 02/24/19  PT Start Time: 0941     PT Stop Time: 1003  PT Total Time (min): 22 min     Billable Minutes: Evaluation 22 min      Susie Burch, PT  02/24/2019

## 2019-02-24 NOTE — PLAN OF CARE
Problem: Fall Injury Risk  Goal: Absence of Fall and Fall-Related Injury  Outcome: Ongoing (interventions implemented as appropriate)  Located close to nursing station, up to restroom with crutches without assist. Has hinged brace to lle.    Problem: Adult Inpatient Plan of Care  Goal: Plan of Care Review  Outcome: Ongoing (interventions implemented as appropriate)   02/24/19 0315   Plan of Care Review   Plan of Care Reviewed With patient   Resting quietly at intervals, medicated for nausea x 1 last pm. Otherwise tolerating diet and drinking po fluids.    Problem: Pain Acute  Goal: Optimal Pain Control  Outcome: Ongoing (interventions implemented as appropriate)  Received scheduled iv tylenol and muscle relaxer as ordered. Also po/iv prn med x1 for level 8-10/10.

## 2019-02-24 NOTE — NURSING
Ice pack placed on patient's left lower extremity.  Patient tolerating it well.  Left lower extremity elevated on pillow.

## 2019-02-24 NOTE — NURSING
Pt discharged per MD order. IV removed. Catheter tip intact. No distress noted. Discharge instructions explained. AVS given to patient. Pt verbalized understanding. VSS. Afebrile. No complains of pain, N/V, diarrhea, or SOB. Paper Rx provided. Family with patient.

## 2019-02-24 NOTE — NURSING
Patient here via wheelchair from ER, AAOx4, answers questions, SORENSON, Left leg with hinge brace patient uses crutches to ambulate, VSS, NAD. Oriented patient to environment and  Plan of care, hourly rounding, and white board patient verbalized understanding

## 2019-02-24 NOTE — PLAN OF CARE
Problem: Physical Therapy Goal  Goal: Physical Therapy Goal  Outcome: Outcome(s) achieved Date Met: 02/24/19  Pt ambulated ~100 ft mod I using B axillary crutches and LLE hinged knee brace.  Pt can be D/C'ed home with family support.

## 2019-02-24 NOTE — PLAN OF CARE
Patient discharged before need assessment completed, patient have no needs, will follow-up with surgeon in [place], [place].         02/24/19 1142   Discharge Assessment   Assessment Type Discharge Planning Assessment

## 2019-02-24 NOTE — NURSING
Dr. Rubin was notified of patient's persistent pain and is now developing mild itchiness and rashes.  Patient reports that she believes it's from receiving Morphine.      New orders received:    1:  Change Oxycodone 15mg PO q4 hours PRN for pain  2. Benadryl 25mg IV x1 now, then  3. Benadryl 25mg PO q6 hours PRN for itchiness/rashes

## 2019-03-23 NOTE — DISCHARGE SUMMARY
DATE OF ADMISSION:  02/23/2019    DATE OF DISCHARGE:  02/24/2019    ADMITTING PHYSICIAN:  Dr. Corral.    CHIEF COMPLAINT:  Intractable left knee pain status post ACL reconstruction.    HOSPITAL COURSE:  The patient is a 48-year-old female with history of a left ACL   reconstruction on Friday, the 23rd.  She had this done in [place] and going   back to [place] where she was going to spend the weekend.  She had pain, she   presented to the Emergency Room.  She was treated with pain medication.  She   came back to the Emergency Room again next morning and was found to have   intractable pain.  She was admitted.  There is no evidence of compartment   syndrome.  X-rays were unremarkable.  Her pain was controlled with IV   medications.  She was discharged home on 02/24/2019, with instructions to use   crutches and follow up with her treating physician.      MOR  dd: 03/22/2019 15:29:26 (CDT)  td: 03/23/2019 02:29:42 (CDT)  Doc ID   #[id number]  Job ID #[id number]    CC:

## 2019-06-25 ENCOUNTER — TELEPHONE (OUTPATIENT)
Dept: INTERNAL MEDICINE | Facility: CLINIC | Age: 49
End: 2019-06-25

## 2019-06-25 RX ORDER — TRAZODONE HYDROCHLORIDE 100 MG/1
TABLET ORAL
Qty: 90 TABLET | Refills: 0 | Status: SHIPPED | OUTPATIENT
Start: 2019-06-25

## 2019-06-25 RX ORDER — TRAZODONE HYDROCHLORIDE 100 MG/1
TABLET ORAL
Qty: 30 TABLET | Refills: 0 | Status: SHIPPED | OUTPATIENT
Start: 2019-06-25 | End: 2019-06-25 | Stop reason: SDUPTHER

## 2019-06-25 NOTE — TELEPHONE ENCOUNTER
Patient moved to [facility] so cut her off for more refills.  Make a note on post it of her chart.    Thanks charlotte

## 2019-06-25 NOTE — TELEPHONE ENCOUNTER
Staff called to make an appointment for patient's physical. Patient stated that she has moved to [place], which is why she has not been in for any appointments.

## 2019-12-12 ENCOUNTER — TELEPHONE (OUTPATIENT)
Dept: NEUROLOGY | Facility: CLINIC | Age: 49
End: 2019-12-12

## 2019-12-12 NOTE — TELEPHONE ENCOUNTER
Spoke to pt she states she is coming in for numbness on finger tips and toes  she states she has gotten labs done and her Dr stated she should see a neurologist  PCP is Dr Teresa Goodson dt office at [phone] spoke to  she stated to fax cover sheet requesting what I will like fax to us at [phone]  Request fax over

## 2019-12-12 NOTE — TELEPHONE ENCOUNTER
----- Message from Ashley Pineda DO sent at 12/6/2019 11:17 AM EST -----  This is pt , self referral     What is the reason, who is the referrring doctor and obtain records   Kenna Hernandez     Nothing is in chart

## 2020-02-06 ENCOUNTER — HOSPITAL ENCOUNTER (EMERGENCY)
Facility: HOSPITAL | Age: 50
Discharge: HOME | End: 2020-02-06
Attending: EMERGENCY MEDICINE
Payer: OTHER GOVERNMENT

## 2020-02-06 VITALS
TEMPERATURE: 98.3 F | WEIGHT: 122 LBS | RESPIRATION RATE: 16 BRPM | HEIGHT: 64 IN | BODY MASS INDEX: 20.83 KG/M2 | OXYGEN SATURATION: 97 % | HEART RATE: 75 BPM | SYSTOLIC BLOOD PRESSURE: 152 MMHG | DIASTOLIC BLOOD PRESSURE: 70 MMHG

## 2020-02-06 DIAGNOSIS — M23.92 INTERNAL DERANGEMENT OF LEFT KNEE: Primary | ICD-10-CM

## 2020-02-06 PROCEDURE — 99283 EMERGENCY DEPT VISIT LOW MDM: CPT

## 2020-02-06 PROCEDURE — 99283 EMERGENCY DEPT VISIT LOW MDM: CPT | Performed by: EMERGENCY MEDICINE

## 2020-02-07 NOTE — ED PROVIDER NOTES
History  Chief Complaint   Patient presents with    Knee Injury     pt states had ACL surgery twice last one in Oct  on Tues felt L knee pop       History provided by:  Patient   used: No      44-year-old female with a history of left ACL tear requiring surgery last year presented for evaluation after feeling a pop in the left knee with some increased pain a few days ago  She has an appointment with her orthopedist early next week but felt that she could not wait  She has been using a brace and crutches at home  Basically came to the ED requesting MRI  Was referred by Urgent Care  Patient has no neurologic deficits  There is no edema  She does have a popping with flexion of the knee  No significant laxity with anterior / posterior drawer  Discussed emergent MRI not available this time and she will need to follow-up with her orthopedic surgeon  Continue supportive measures  None       No past medical history on file  No past surgical history on file  No family history on file  I have reviewed and agree with the history as documented  Social History     Tobacco Use    Smoking status: Not on file   Substance Use Topics    Alcohol use: Not on file    Drug use: Not on file        Review of Systems   Constitutional: Negative for activity change and appetite change  Musculoskeletal: Positive for gait problem  Negative for back pain and neck pain  Neurological: Negative for dizziness, weakness and numbness  All other systems reviewed and are negative  Physical Exam  Physical Exam   Constitutional: She is oriented to person, place, and time  She appears well-developed and well-nourished  Cardiovascular: Normal rate and intact distal pulses  Musculoskeletal: She exhibits no deformity  Popping with flexion of the left knee  Slight laxity with anterior/posterior drawer  No effusion  Neurological: She is alert and oriented to person, place, and time   No sensory deficit  She exhibits normal muscle tone  Skin: Skin is warm and dry  No erythema  Psychiatric: She has a normal mood and affect  Her behavior is normal    Nursing note and vitals reviewed  Vital Signs  ED Triage Vitals [02/06/20 1820]   Temperature Pulse Respirations Blood Pressure SpO2   98 3 °F (36 8 °C) 75 16 152/70 97 %      Temp Source Heart Rate Source Patient Position - Orthostatic VS BP Location FiO2 (%)   Oral -- -- -- --      Pain Score       8           Vitals:    02/06/20 1820   BP: 152/70   Pulse: 75         Visual Acuity      ED Medications  Medications - No data to display    Diagnostic Studies  Results Reviewed     None                 No orders to display              Procedures  Procedures         ED Course                               MDM  Number of Diagnoses or Management Options  Internal derangement of left knee: new and does not require workup  Diagnosis management comments:  22-year-old female with prior ACL injury with surgical intervention presented after a possibly re-injuring it after feeling a pop few days ago  Already has follow-up with Orthopedics but looking for MRI  Unable to MRI emergently for this condition  Will need to wait for Orthopedics  For now continue immobilizer and crutches  Nonweightbearing until cleared by Orthopedics  Patient Progress  Patient progress: stable        Disposition  Final diagnoses:   Internal derangement of left knee     Time reflects when diagnosis was documented in both MDM as applicable and the Disposition within this note     Time User Action Codes Description Comment    2/6/2020  7:28 PM Emma Watts Add [M23 92] Internal derangement of left knee       ED Disposition     ED Disposition Condition Date/Time Comment    Discharge Stable Thu Feb 6, 2020  7:27 PM Criss Garnica discharge to home/self care              Follow-up Information     Follow up With Specialties Details Why Contact Info    Your Orthopedist There are no discharge medications for this patient  No discharge procedures on file      ED Provider  Electronically Signed by           Lv Tucker MD  02/19/20 7729

## 2020-02-07 NOTE — ED NOTES
Seen, evaluated, all physical assessments, and discharge completed by provider Dr Michael Squires  Ambulatory off unit with steady gait in stable condition  Offers no further questions at discharge        Harrison Bueno RN  02/06/20 2009

## 2021-04-09 DIAGNOSIS — Z23 ENCOUNTER FOR IMMUNIZATION: ICD-10-CM

## 2022-07-01 ENCOUNTER — TELEPHONE (OUTPATIENT)
Dept: PAIN MEDICINE | Facility: CLINIC | Age: 52
End: 2022-07-01

## 2022-11-07 ENCOUNTER — HOSPITAL ENCOUNTER (EMERGENCY)
Facility: HOSPITAL | Age: 52
Discharge: HOME | End: 2022-11-07
Attending: EMERGENCY MEDICINE

## 2022-11-07 ENCOUNTER — APPOINTMENT (EMERGENCY)
Dept: RADIOLOGY | Facility: HOSPITAL | Age: 52
End: 2022-11-07

## 2022-11-07 VITALS
HEART RATE: 68 BPM | HEIGHT: 64 IN | DIASTOLIC BLOOD PRESSURE: 78 MMHG | WEIGHT: 119 LBS | BODY MASS INDEX: 20.32 KG/M2 | SYSTOLIC BLOOD PRESSURE: 151 MMHG | OXYGEN SATURATION: 98 % | RESPIRATION RATE: 18 BRPM | TEMPERATURE: 98.4 F

## 2022-11-07 DIAGNOSIS — R05.9 COUGH: Primary | ICD-10-CM

## 2022-11-07 DIAGNOSIS — J40 BRONCHITIS: ICD-10-CM

## 2022-11-07 LAB
FLUAV RNA RESP QL NAA+PROBE: NEGATIVE
FLUBV RNA RESP QL NAA+PROBE: NEGATIVE
RSV RNA RESP QL NAA+PROBE: NEGATIVE
SARS-COV-2 RNA RESP QL NAA+PROBE: NEGATIVE

## 2022-11-07 RX ORDER — DEXTROMETHORPHAN HYDROBROMIDE AND PROMETHAZINE HYDROCHLORIDE 15; 6.25 MG/5ML; MG/5ML
5 SOLUTION ORAL 4 TIMES DAILY PRN
Qty: 100 ML | Refills: 0 | Status: SHIPPED | OUTPATIENT
Start: 2022-11-07 | End: 2022-11-12

## 2022-11-07 NOTE — ED PROVIDER NOTES
History  Chief Complaint   Patient presents with   • Cough     Cough, congestion since last tuesday  Home covid yesterday (-)  Feels this is getting worse  HPI     63-year-old female presenting for evaluation of 6 days of cough with nasal congestion  Cough is occasionally productive of yellow sputum  Reports feeling as if she has chest congestion but denies chest pain  Reports feeling mildly short of breath  She took a home COVID test yesterday that was negative  No fever or chills  Occasional body aches  Denies nausea, vomiting, or diarrhea  She has been taking Robitussin and Mucinex without improvement in her symptoms  No known sick contacts  None       History reviewed  No pertinent past medical history  History reviewed  No pertinent surgical history  History reviewed  No pertinent family history  I have reviewed and agree with the history as documented  E-Cigarette/Vaping   • E-Cigarette Use Never User      E-Cigarette/Vaping Substances     Social History     Tobacco Use   • Smoking status: Never Smoker   • Smokeless tobacco: Never Used   Vaping Use   • Vaping Use: Never used   Substance Use Topics   • Alcohol use: Yes     Alcohol/week: 4 0 standard drinks     Types: 4 Glasses of wine per week   • Drug use: Never       Review of Systems   Constitutional: Negative for chills and fever  HENT: Positive for congestion  Negative for sore throat  Eyes: Negative for visual disturbance  Respiratory: Positive for cough  Negative for shortness of breath  Cardiovascular: Negative for chest pain and leg swelling         "chest congestion"   Gastrointestinal: Negative for abdominal pain, diarrhea, nausea and vomiting  Genitourinary: Negative for dysuria and frequency  Musculoskeletal: Negative for arthralgias, back pain, neck pain and neck stiffness  Skin: Negative for rash  Neurological: Negative for weakness, numbness and headaches     Psychiatric/Behavioral: Negative for agitation, behavioral problems and confusion  All other systems reviewed and are negative  Physical Exam  Physical Exam  Vitals and nursing note reviewed  Constitutional:       General: She is not in acute distress  Appearance: She is well-developed  She is not diaphoretic  HENT:      Head: Normocephalic and atraumatic  Right Ear: External ear normal       Left Ear: External ear normal       Nose: Nose normal    Eyes:      Conjunctiva/sclera: Conjunctivae normal    Cardiovascular:      Rate and Rhythm: Normal rate and regular rhythm  Pulses: Normal pulses  Heart sounds: Normal heart sounds  No murmur heard  No friction rub  No gallop  Pulmonary:      Effort: Pulmonary effort is normal  No respiratory distress  Breath sounds: Normal breath sounds  No wheezing or rales  Abdominal:      General: Bowel sounds are normal  There is no distension  Palpations: Abdomen is soft  Tenderness: There is no abdominal tenderness  There is no guarding  Musculoskeletal:         General: No deformity  Normal range of motion  Cervical back: Normal range of motion and neck supple  Right lower leg: No edema  Left lower leg: No edema  Skin:     General: Skin is warm and dry  Neurological:      Mental Status: She is alert and oriented to person, place, and time  Motor: No abnormal muscle tone     Psychiatric:         Mood and Affect: Mood normal          Vital Signs  ED Triage Vitals [11/07/22 0718]   Temperature Pulse Respirations Blood Pressure SpO2   98 4 °F (36 9 °C) 68 18 151/78 98 %      Temp Source Heart Rate Source Patient Position - Orthostatic VS BP Location FiO2 (%)   Oral Monitor Sitting Right arm --      Pain Score       --           Vitals:    11/07/22 0718   BP: 151/78   Pulse: 68   Patient Position - Orthostatic VS: Sitting         Visual Acuity      ED Medications  Medications - No data to display    Diagnostic Studies  Results Reviewed Procedure Component Value Units Date/Time    FLU/RSV/COVID - if FLU/RSV clinically relevant [[id number]]  (Normal) Collected: 11/07/22 0756    Lab Status: Final result Specimen: Nares from Nose Updated: 11/07/22 0844     SARS-CoV-2 Negative     INFLUENZA A PCR Negative     INFLUENZA B PCR Negative     RSV PCR Negative    Narrative:      FOR PEDIATRIC PATIENTS - copy/paste COVID Guidelines URL to browser: [email or web address]  Emerging Threats    SARS-CoV-2 assay is a Nucleic Acid Amplification assay intended for the  qualitative detection of nucleic acid from SARS-CoV-2 in nasopharyngeal  swabs  Results are for the presumptive identification of SARS-CoV-2 RNA  Positive results are indicative of infection with SARS-CoV-2, the virus  causing COVID-19, but do not rule out bacterial infection or co-infection  with other viruses  Laboratories within the [place] and its  territories are required to report all positive results to the appropriate  public health authorities  Negative results do not preclude SARS-CoV-2  infection and should not be used as the sole basis for treatment or other  patient management decisions  Negative results must be combined with  clinical observations, patient history, and epidemiological information  This test has not been FDA cleared or approved  This test has been authorized by FDA under an Emergency Use Authorization  (EUA)  This test is only authorized for the duration of time the  declaration that circumstances exist justifying the authorization of the  emergency use of an in vitro diagnostic tests for detection of SARS-CoV-2  virus and/or diagnosis of COVID-19 infection under section 564(b)(1) of  the Act, 21 U  S C  285BGL-3(T)(7), unless the authorization is terminated  or revoked sooner  The test has been validated but independent review by FDA  and CLIA is pending  Test performed using [employer]:  This RT-PCR assay targets N2,  a region unique to SARS-CoV-2  A conserved region in the E-gene was chosen  for pan-Sarbecovirus detection which includes SARS-CoV-2  According to CMS-2020-01-R, this platform meets the definition of high-throughput technology  XR chest 1 view portable   Final Result by Stef Finnegan MD (11/07 0809)      No acute cardiopulmonary disease  Workstation performed: [id number]                    Procedures  Procedures         ED Course  ED Course as of 11/07/22 0835   [facility] Nov 07, 2022   0831 Chest x-ray with no cardiopulmonary abnormalities  COVID, flu, and RSV testing sent and pending  Patient is outside of the window for treatment on all with oral medications for either COVID or influenza  Strongly suspect bronchitis at the cause of her symptoms  Discussed with the patient the given lack of fever and normal chest x-ray her bronchitis is most likely to be viral so no indication for antibiotics at this time  No wheezing to suggest role for bronchodilators or steroids  Will prescribe prescription cough syrup recommend follow-up with her doctor in 1 week if symptoms persist   Return precautions discussed  MDM  Number of Diagnoses or Management Options  Bronchitis: new and requires workup  Cough: new and requires workup  Diagnosis management comments: Please see ED course above for details of the Medical Decision Making            Amount and/or Complexity of Data Reviewed  Clinical lab tests: ordered  Tests in the radiology section of CPT®: ordered and reviewed  Independent visualization of images, tracings, or specimens: yes    Patient Progress  Patient progress: stable      Disposition  Final diagnoses:   Cough   Bronchitis     Time reflects when diagnosis was documented in both MDM as applicable and the Disposition within this note     Time User Action Codes Description Comment    11/7/2022  8:29 AM Clearnce Louder Add [R05 9] Cough     11/7/2022  8:29 AM Alphonse Gould Add [J40] Bronchitis       ED Disposition     ED Disposition   Discharge    Condition   Stable    Date/Time   Mon Nov 7, 2022  8:29 AM    Comment   Diego Ruiz discharge to home/self care  Follow-up Information     Follow up With Specialties Details Why Contact Info Additional Information    Jaya Montes MD Family Medicine In 1 week For re-evaluation if symptoms persist  [address]   [address]  [id number]  [facility] Emergency Department Emergency Medicine  As we discussed, return to the Emergency Department for chest pain, difficulty breathing, fevers, or for vomiting with inability to tolerate oral intake  [address] Emergency Department, [address], [place], [place], [address]          Discharge Medication List as of 11/7/2022  8:30 AM      START taking these medications    Details   Promethazine-DM (PHENERGAN-DM) 6 25-15 mg/5 mL oral syrup Take 5 mL by mouth 4 (four) times a day as needed for cough for up to 5 days, Starting Mon 11/7/2022, Until Sat 11/12/2022 at 2359, Normal             No discharge procedures on file      PDMP Review     None          ED Provider  Electronically Signed by           Gita Palacios MD  11/07/22 [id number]

## 2023-03-09 ENCOUNTER — HOSPITAL ENCOUNTER (EMERGENCY)
Facility: HOSPITAL | Age: 53
Discharge: HOME | End: 2023-03-09
Attending: EMERGENCY MEDICINE | Admitting: EMERGENCY MEDICINE

## 2023-03-09 VITALS
DIASTOLIC BLOOD PRESSURE: 71 MMHG | HEIGHT: 64 IN | BODY MASS INDEX: 21.15 KG/M2 | RESPIRATION RATE: 20 BRPM | TEMPERATURE: 98.5 F | OXYGEN SATURATION: 98 % | SYSTOLIC BLOOD PRESSURE: 134 MMHG | WEIGHT: 123.9 LBS | HEART RATE: 72 BPM
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
## 2023-03-09 DIAGNOSIS — S16.1XXA STRAIN OF NECK MUSCLE, INITIAL ENCOUNTER: Primary | ICD-10-CM

## 2023-03-09 NOTE — Clinical Note
Tasha Givens was seen and treated in our emergency department on 3/9/2023  Diagnosis:     Georges Neri  may return to work on return date  She may return on this date: 03/10/2023         If you have any questions or concerns, please don't hesitate to call        Syed Deutsch DO    ______________________________           _______________          _______________  Hospital Representative                              Date                                Time
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
## 2023-08-01 ENCOUNTER — TELEPHONE (OUTPATIENT)
Dept: NEUROSURGERY | Facility: CLINIC | Age: 53
End: 2023-08-01

## 2023-08-01 NOTE — TELEPHONE ENCOUNTER
8/1/23 PT CALLED NEWPT LINE AND LMOM 141PM FOR JUANA FOR PERM SCS IMPLANT. CALLED PT AND LMOM FOR HER TO CB TO DISCUSS NEED FOR APPT.

## 2023-08-03 ENCOUNTER — TELEPHONE (OUTPATIENT)
Age: 53
End: 2023-08-03

## 2023-08-03 NOTE — TELEPHONE ENCOUNTER
Caller: Kimberly Harrington NP    Doctor: Dr Frances Betts    Reason for call: Please email an ASHLEY to the NP so that she can have her records sent to the doctor for review. Email : [email or web address] please   Please fax to :   Dr Vicente YUEN#[phone]    Call back#: [phone]

## 2023-08-03 NOTE — TELEPHONE ENCOUNTER
8/3/23 PT CALLED BACK, SHE STATES SHE IS REFERRED TO DR LORAINE JERONIMO BY HER PCP AT [facility]  [address] [facility] FOR HER KNEE PAIN. PT HAS NOT HAD TRIAL COMPLETED. PROVIDED SLPM  AND SHE WILL CALL TO MAKE APPT FOR CONSULT TO DISCUSS POSS SCS TRIAL. SHE WAS APPRECIATIVE FOR THE INFORMATION.

## 2023-10-31 ENCOUNTER — TELEPHONE (OUTPATIENT)
Dept: PSYCHIATRY | Facility: CLINIC | Age: 53
End: 2023-10-31

## 2024-05-17 ENCOUNTER — TELEPHONE (OUTPATIENT)
Age: 54
End: 2024-05-17

## 2024-05-17 NOTE — TELEPHONE ENCOUNTER
Patient trying to set appointment, has referral through  for [facility].  Advised to have ordering send the referral for SL to be able to schedule.

## 2024-05-20 NOTE — TELEPHONE ENCOUNTER
Rec'd Referral from [facility]    Entered in system and scanned into chart

## 2024-06-11 ENCOUNTER — CONSULT (OUTPATIENT)
Dept: PLASTIC SURGERY | Facility: CLINIC | Age: 54
End: 2024-06-11
Payer: OTHER GOVERNMENT

## 2024-06-11 DIAGNOSIS — M79.89 SOFT TISSUE MASS: Primary | ICD-10-CM

## 2024-06-11 PROCEDURE — 99243 OFF/OP CNSLTJ NEW/EST LOW 30: CPT | Performed by: PHYSICIAN ASSISTANT

## 2024-06-11 NOTE — PROGRESS NOTES
H&P Exam - Laura Rhoades 54 y.o. female MRN: [id number]    Unit/Bed#:  Encounter: [id number]    Assessment/ Plan:    Patient is a 54-year-old female who presents to the office today for evaluation of a soft tissue mass of her right cheek.  Please see HPI.    As the mass is not bothersome to the patient, we will elect for watchful waiting.  Mass has been present approximately 3 months.  I did discuss with the patient that at this point, surgical intervention would likely to cause a worse aesthetic outcome.  Patient voiced understanding.  I did encourage her to massage the area.  She will return to office in approximately 6 months or sooner with any questions or concerns.    History of Present Illness   Patient reports that she has not had a nontender mass of her face for approximately 3 months.  She is unsure of the origin.  She did have a staph infection near that area remotely, as well as filler placed more medially to the area approximately 2 years ago.  The patient did receive a CT which was negative for mass or abscess.      Upon evaluation, the patient has a soft, palpable round mass measuring approximately 1.5 x 1 cm of the right cheek.  It is not visible, though there is a photo in media.  Nonfluctuant, nontender.    Review of Systems   A 12 point ROS was completed and is negative except as per HPI     Historical Information   No past medical history on file.  Past Surgical History:   Procedure Laterality Date    APPENDECTOMY      HYSTERECTOMY      KNEE SURGERY Left     x4     Social History   Social History     Substance and Sexual Activity   Alcohol Use Yes    Alcohol/week: 4.0 standard drinks of alcohol    Types: 4 Glasses of wine per week     Social History     Substance and Sexual Activity   Drug Use Never     Social History     Tobacco Use   Smoking Status Never   Smokeless Tobacco Never     E-Cigarette Use: Never User     E-Cigarette/Vaping Substances       Family History:  non-contributory    Meds/Allergies   all medications and allergies reviewed  No Known Allergies    Objective   First Vitals:       Current Vitals:            Invasive Devices       None                   Physical Exam  Vitals and nursing note reviewed.   Constitutional:       General: She is not in acute distress.     Appearance: Normal appearance. She is normal weight. She is not ill-appearing or toxic-appearing.   HENT:      Head: Normocephalic and atraumatic.      Nose: Nose normal.      Mouth/Throat:      Mouth: Mucous membranes are moist.   Eyes:      General:         Right eye: No discharge.         Left eye: No discharge.      Extraocular Movements: Extraocular movements intact.      Conjunctiva/sclera: Conjunctivae normal.      Pupils: Pupils are equal, round, and reactive to light.   Cardiovascular:      Rate and Rhythm: Normal rate and regular rhythm.      Pulses: Normal pulses.      Heart sounds: Normal heart sounds.   Pulmonary:      Effort: Pulmonary effort is normal. No respiratory distress.      Breath sounds: Normal breath sounds.   Abdominal:      General: Abdomen is flat.      Palpations: Abdomen is soft.   Musculoskeletal:         General: No swelling, tenderness, deformity or signs of injury. Normal range of motion.      Right lower leg: No edema.      Left lower leg: No edema.   Skin:     General: Skin is warm and dry.      Comments: See HPI    Neurological:      General: No focal deficit present.      Mental Status: She is alert and oriented to person, place, and time.      Cranial Nerves: No cranial nerve deficit.      Sensory: No sensory deficit.      Motor: No weakness.   Psychiatric:         Mood and Affect: Mood normal.         Behavior: Behavior normal.

## 2024-06-21 ENCOUNTER — OFFICE VISIT (OUTPATIENT)
Dept: DERMATOLOGY | Facility: CLINIC | Age: 54
End: 2024-06-21
Payer: OTHER GOVERNMENT

## 2024-06-21 VITALS — TEMPERATURE: 98.4 F | WEIGHT: 126 LBS | BODY MASS INDEX: 21.63 KG/M2

## 2024-06-21 DIAGNOSIS — D48.5 NEOPLASM OF UNCERTAIN BEHAVIOR OF SKIN: ICD-10-CM

## 2024-06-21 DIAGNOSIS — D22.72 MULTIPLE BENIGN MELANOCYTIC NEVI OF BOTH UPPER EXTREMITIES, BOTH LOWER EXTREMITIES, AND TRUNK: ICD-10-CM

## 2024-06-21 DIAGNOSIS — D22.71 MULTIPLE BENIGN MELANOCYTIC NEVI OF BOTH UPPER EXTREMITIES, BOTH LOWER EXTREMITIES, AND TRUNK: ICD-10-CM

## 2024-06-21 DIAGNOSIS — D18.01 CHERRY ANGIOMA: ICD-10-CM

## 2024-06-21 DIAGNOSIS — L82.1 SEBORRHEIC KERATOSIS: Primary | ICD-10-CM

## 2024-06-21 DIAGNOSIS — D22.61 MULTIPLE BENIGN MELANOCYTIC NEVI OF BOTH UPPER EXTREMITIES, BOTH LOWER EXTREMITIES, AND TRUNK: ICD-10-CM

## 2024-06-21 DIAGNOSIS — L81.4 SOLAR LENTIGO: ICD-10-CM

## 2024-06-21 DIAGNOSIS — D22.5 MULTIPLE BENIGN MELANOCYTIC NEVI OF BOTH UPPER EXTREMITIES, BOTH LOWER EXTREMITIES, AND TRUNK: ICD-10-CM

## 2024-06-21 DIAGNOSIS — D22.62 MULTIPLE BENIGN MELANOCYTIC NEVI OF BOTH UPPER EXTREMITIES, BOTH LOWER EXTREMITIES, AND TRUNK: ICD-10-CM

## 2024-06-21 PROCEDURE — 11102 TANGNTL BX SKIN SINGLE LES: CPT | Performed by: DERMATOLOGY

## 2024-06-21 PROCEDURE — 11103 TANGNTL BX SKIN EA SEP/ADDL: CPT | Performed by: DERMATOLOGY

## 2024-06-21 PROCEDURE — 99204 OFFICE O/P NEW MOD 45 MIN: CPT | Performed by: DERMATOLOGY

## 2024-06-21 PROCEDURE — 88341 IMHCHEM/IMCYTCHM EA ADD ANTB: CPT | Performed by: STUDENT IN AN ORGANIZED HEALTH CARE EDUCATION/TRAINING PROGRAM

## 2024-06-21 PROCEDURE — 88342 IMHCHEM/IMCYTCHM 1ST ANTB: CPT | Performed by: STUDENT IN AN ORGANIZED HEALTH CARE EDUCATION/TRAINING PROGRAM

## 2024-06-21 PROCEDURE — 88305 TISSUE EXAM BY PATHOLOGIST: CPT | Performed by: STUDENT IN AN ORGANIZED HEALTH CARE EDUCATION/TRAINING PROGRAM

## 2024-06-21 RX ORDER — ALPRAZOLAM 0.5 MG/1
TABLET ORAL
COMMUNITY
Start: 2024-01-05

## 2024-06-21 RX ORDER — ESTRADIOL 1 MG/1
TABLET ORAL
COMMUNITY
Start: 2024-04-23

## 2024-06-21 RX ORDER — TRAZODONE HYDROCHLORIDE 100 MG/1
TABLET ORAL
COMMUNITY
Start: 2019-06-24

## 2024-06-21 RX ORDER — DULOXETIN HYDROCHLORIDE 20 MG/1
20 CAPSULE, DELAYED RELEASE ORAL DAILY
COMMUNITY

## 2024-06-21 RX ORDER — GABAPENTIN 100 MG/1
CAPSULE ORAL
COMMUNITY
Start: 2024-01-05

## 2024-06-21 RX ORDER — PHENTERMINE HYDROCHLORIDE 37.5 MG/1
37.5 TABLET ORAL
COMMUNITY
Start: 2024-06-12

## 2024-06-21 NOTE — PATIENT INSTRUCTIONS
"III. POST-PROCEDURAL CARE (WHAT YOU WILL NEED TO DO \"AFTER THE BIOPSY\" TO OPTIMIZE HEALING)    Keep the area clean and dry.  Try NOT to remove the bandage or get it wet for the first 24 hours.    Gently clean the area and apply surgical ointment (such as Vaseline petrolatum ointment, which is available \"over the counter\" and not a prescription) to the biopsy site for up to 2 weeks straight.  This acts to protect the wound from the outside world.      Sutures are not usually placed in this procedure.  If any sutures were placed, return for suture removal as instructed (generally 1 week for the face, 2 weeks for the body).      Take Acetaminophen (Tylenol) for discomfort, if no contraindications.  Ibuprofen or aspirin could make bleeding worse.    Call our office immediately for signs of infection: fever, chills, increased redness, warmth, tenderness, discomfort/pain, or pus or foul smell coming from the wound.    WHAT TO DO IF THERE IS ANY BLEEDING?  If a small amount of bleeding is noticed, place a clean cloth over the area and apply firm pressure for ten minutes.  Check the wound after 10 minutes of direct pressure.  If bleeding persists, try one more time for an additional 10 minutes of direct pressure on the area.  If the bleeding becomes heavier or does not stop after the second attempt, or if you have any other questions about this procedure, then please call your [facility] Dermatologist by calling [phone] (SKIN).     I hereby acknowledge that I have reviewed and verified the site with my Dermatologist and have requested and authorized my Dermatologist to proceed with the procedure.    "

## 2024-06-21 NOTE — PROGRESS NOTES
"[facility] Dermatology Clinic Note     Patient Name: Laura Rhoades  Encounter Date: 06/21/24     Have you been cared for by a [facility] Dermatologist in the last 3 years and, if so, which description applies to you?    NO.   I am considered a \"new\" patient and must complete all patient intake questions. I am FEMALE/of child-bearing potential.    REVIEW OF SYSTEMS:  Have you recently had or currently have any of the following? Recent fever or chills? No  Any non-healing wound? No  Are you pregnant or planning to become pregnant? No  Are you currently or planning to be nursing or breast feeding? No   PAST MEDICAL HISTORY:  Have you personally ever had or currently have any of the following?  If \"YES,\" then please provide more detail. Skin cancer (such as Melanoma, Basal Cell Carcinoma, Squamous Cell Carcinoma?  No  Tuberculosis, HIV/AIDS, Hepatitis B or C: No  Radiation Treatment No   HISTORY OF IMMUNOSUPPRESSION:   Do you have a history of any of the following:  Systemic Immunosuppression such as Diabetes, Biologic or Immunotherapy, Chemotherapy, Organ Transplantation, Bone Marrow Transplantation?  No    Answering \"YES\" requires the addition of the dotphrase \"IMMUNOSUPPRESSED\" as the first diagnosis of the patient's visit.   FAMILY HISTORY:  Any \"first degree relatives\" (parent, brother, sister, or child) with the following?    Skin Cancer, Pancreatic or Other Cancer? No   PATIENT EXPERIENCE:    Do you want the Dermatologist to perform a COMPLETE skin exam today including a clinical examination under the \"bra and underwear\" areas?  Yes  If necessary, do we have your permission to call and leave a detailed message on your Preferred Phone number that includes your specific medical information?  Yes      No Known Allergies   Current Outpatient Medications:     ALPRAZolam (XANAX) 0.5 mg tablet, , Disp: , Rfl:     DULoxetine (CYMBALTA) 20 mg capsule, Take 20 mg by mouth daily, Disp: , Rfl:     estradiol (ESTRACE) 1 mg " tablet, , Disp: , Rfl:     gabapentin (NEURONTIN) 100 mg capsule, , Disp: , Rfl:     phentermine (ADIPEX-P) 37.5 MG tablet, Take 37.5 mg by mouth daily before breakfast, Disp: , Rfl:     traZODone (DESYREL) 100 mg tablet, , Disp: , Rfl:           Whom besides the patient is providing clinical information about today's encounter?   NO ADDITIONAL HISTORIAN (patient alone provided history)    Physical Exam and Assessment/Plan by Diagnosis:    SEBORRHEIC KERATOSES  - Relevant exam: Scattered over the trunk/extremities are waxy brown to black plaques and papules with stuck on appearance and consistent dermoscopy  - Exam and clinical history consistent with seborrheic keratoses  - Counseled that these are benign growths that do not require treatment    MELANOCYTIC NEVI  -Relevant exam: Scattered over the trunk/extremities are homogenously pigmented brown macules and papules. ELM performed and without concerning findings. No outliers unless otherwise noted in today's note  - Exam and clinical history consistent with melanocytic nevi  - Counseled to return to clinic prior to scheduled appointment should any of these lesions change or should any new lesions of concern arise  - Counseled on use of sun protection daily. Reviewed latest FDA sunscreen guidelines, including use of broad spectrum (UVA and UVB blocking) sunscreen or sun protective clothing with SPF 30-50 every 2-3 hours and reapplied after exposure to water    LENTIGINES  OTHER SKIN CHANGES DUE TO CHRONIC EXPOSURE TO NONIONIZING RADIATION  - Relevant exam: Over sun exposed areas are brown macules. ELM performed and without concerning findings.  - Exam and clinical history consistent with lentigines.  - Counseled to return to clinic prior to scheduled appointment should any of these lesions change or should any new lesions of concern arise.  - Recommended use of sunscreen as above and below.    CHERRY ANGIOMAS  - Relevant exam: Scattered over the trunk/extremities  "are red papules  - Exam and clinical history consistent with cherry angiomas  - Educated that these are benign    NEOPLASM OF UNCERTAIN BEHAVIOR OF SKIN    Physical Exam:  (Anatomic Location); (Size and Morphological Description); (Differential Diagnosis):  A. Left upper chest Pearly pink plaque 0.5 cm   B. Mid chest pearly pink plaque 0.5 cm   Pertinent Positives:  Pertinent Negatives:    Additional History of Present Condition:  Lesion on the mid chest present for months. No changes. Lesion on upper chest present for 1 week.    Assessment and Plan:  I have discussed with the patient that a sample of skin via a \"skin biopsy” would be potentially helpful to further make a specific diagnosis under the microscope.  Based on a thorough discussion of this condition and the management approach to it (including a comprehensive discussion of the known risks, side effects and potential benefits of treatment), the patient (family) agrees to implement the following specific plan:    Procedure:  Skin Biopsy.  After a thorough discussion of treatment options and risk/benefits/alternatives (including but not limited to local pain, scarring, dyspigmentation, blistering, possible superinfection, and inability to confirm a diagnosis via histopathology), verbal and written consent were obtained and portion of the rash was biopsied for tissue sample.  See below for consent that was obtained from patient and subsequent Procedure Note.   A.PROCEDURE TANGENTIAL (SHAVE) BIOPSY NOTE:    Performing Physician:   Anatomic Location; Clinical Description with size (cm); Pre-Op Diagnosis:   A. Left upper chest Pearly pink plaque 0.5 cm     Post-op diagnosis: Same     Local anesthesia: 3:1 1% xylocaine with epi and 1-100,000 buffered     Topical anesthesia: None    Hemostasis: Aluminum chloride     B.PROCEDURE TANGENTIAL (SHAVE) BIOPSY NOTE:    Performing Physician:   Anatomic Location; Clinical Description with size (cm); Pre-Op " "Diagnosis:   B. Mid chest pearly pink plaque 0.5 cm  Post-op diagnosis: Same     Local anesthesia: 3:1 1% xylocaine with epi and 1-100,000 buffered     Topical anesthesia: None    Hemostasis: Aluminum chloride   After obtaining informed consent  at which time there was a discussion about the purpose of biopsy  and low risks of infection and bleeding.  The area was prepped and draped in the usual fashion. Anesthesia was obtained with 1% lidocaine with epinephrine. A shave biopsy to an appropriate sampling depth was obtained by Shave (Dermablade or 15 blade) The resulting wound was covered with surgical ointment and bandaged appropriately.     The patient tolerated the procedure well without complications and was without signs of functional compromise.      Specimen has been sent for review by Dermatopathology.    Standard post-procedure care has been explained and has been included in written form within the patient's copy of Informed Consent.    INFORMED CONSENT DISCUSSION AND POST-OPERATIVE INSTRUCTIONS FOR PATIENT    I.  RATIONALE FOR PROCEDURE  I understand that a skin biopsy allows the Dermatologist to test a lesion or rash under the microscope to obtain a diagnosis.  It usually involves numbing the area with numbing medication and removing a small piece of skin; sometimes the area will be closed with sutures. In this specific procedure, sutures are not usually needed.  If any sutures are placed, then they are usually need to be removed in 2 weeks or less.    I understand that my Dermatologist recommends that a skin \"shave\" biopsy be performed today.  A local anesthetic, similar to the kind that a dentist uses when filling a cavity, will be injected with a very small needle into the skin area to be sampled.  The injected skin and tissue underneath \"will go to sleep” and become numb so no pain should be felt afterwards.  An instrument shaped like a tiny \"razor blade\" (shave biopsy instrument) will be used to cut " "a small piece of tissue and skin from the area so that a sample of tissue can be taken and examined more closely under the microscope.  A slight amount of bleeding will occur, but it will be stopped with direct pressure and a pressure bandage and any other appropriate methods.  I understands that a scar will form where the wound was created.  Surgical ointment will be applied to help protect the wound.  Sutures are not usually needed.    II.  RISKS AND POTENTIAL COMPLICATIONS   I understand the risks and potential complications of a skin biopsy include but are not limited to the following:  Bleeding  Infection  Pain  Scar/keloid  Skin discoloration  Incomplete Removal  Recurrence  Nerve Damage/Numbness/Loss of Function  Allergic Reaction to Anesthesia  Biopsies are diagnostic procedures and based on findings additional treatment or evaluation may be required  Loss or destruction of specimen resulting in no additional findings    My Dermatologist has explained to me the nature of the condition, the nature of the procedure, and the benefits to be reasonably expected compared with alternative approaches.  My Dermatologist has discussed the likelihood of major risks or complications of this procedure including the specific risks listed above, such as bleeding, infection, and scarring/keloid.  I understand that a scar is expected after this procedure.  I understand that my physician cannot predict if the scar will form a \"keloid,\" which extends beyond the borders of the wound that is created.  A keloid is a thick, painful, and bumpy scar.  A keloid can be difficult to treat, as it does not always respond well to therapy, which includes injecting cortisone directly into the keloid every few weeks.  While this usually reduces the pain and size of the scar, it does not eliminate it.      I understand that photographs may be taken before and after the procedure.  These will be maintained as part of the medical providers " "confidential records and may not be made available to me.  I further authorize the medical provider to use the photographs for teaching purposes or to illustrate scientific papers, books, or lectures if in his/her judgment, medical research, education, or science may benefit from its use.    I have had an opportunity to fully inquire about the risks and benefits of this procedure and its alternatives.   I have been given ample time and opportunity to ask questions and to seek a second opinion if I wished to do so.  I acknowledge that there have specifically been no guarantees as to the cosmetic results from the procedure.  I am aware that with any procedure there is always the possibility of an unexpected complication.    III. POST-PROCEDURAL CARE (WHAT YOU WILL NEED TO DO \"AFTER THE BIOPSY\" TO OPTIMIZE HEALING)    Keep the area clean and dry.  Try NOT to remove the bandage or get it wet for the first 24 hours.    Gently clean the area and apply surgical ointment (such as Vaseline petrolatum ointment, which is available \"over the counter\" and not a prescription) to the biopsy site for up to 2 weeks straight.  This acts to protect the wound from the outside world.      Sutures are not usually placed in this procedure.  If any sutures were placed, return for suture removal as instructed (generally 1 week for the face, 2 weeks for the body).      Take Acetaminophen (Tylenol) for discomfort, if no contraindications.  Ibuprofen or aspirin could make bleeding worse.    Call our office immediately for signs of infection: fever, chills, increased redness, warmth, tenderness, discomfort/pain, or pus or foul smell coming from the wound.    WHAT TO DO IF THERE IS ANY BLEEDING?  If a small amount of bleeding is noticed, place a clean cloth over the area and apply firm pressure for ten minutes.  Check the wound after 10 minutes of direct pressure.  If bleeding persists, try one more time for an additional 10 minutes of direct " pressure on the area.  If the bleeding becomes heavier or does not stop after the second attempt, or if you have any other questions about this procedure, then please call your [facility] Dermatologist by calling [phone] (SKIN).     I hereby acknowledge that I have reviewed and verified the site with my Dermatologist and have requested and authorized my Dermatologist to proceed with the procedure.       Dawit Price MD  PGY-II Dermatology Resident     Scribe Attestation      I,:  Annetta He am acting as a scribe while in the presence of the attending physician.:       I,:  Mara Novoa MD personally performed the services described in this documentation    as scribed in my presence.:

## 2024-06-26 PROCEDURE — 88342 IMHCHEM/IMCYTCHM 1ST ANTB: CPT | Performed by: STUDENT IN AN ORGANIZED HEALTH CARE EDUCATION/TRAINING PROGRAM

## 2024-06-26 PROCEDURE — 88341 IMHCHEM/IMCYTCHM EA ADD ANTB: CPT | Performed by: STUDENT IN AN ORGANIZED HEALTH CARE EDUCATION/TRAINING PROGRAM

## 2024-06-26 PROCEDURE — 88305 TISSUE EXAM BY PATHOLOGIST: CPT | Performed by: STUDENT IN AN ORGANIZED HEALTH CARE EDUCATION/TRAINING PROGRAM

## 2024-06-27 NOTE — RESULT ENCOUNTER NOTE
DERMATOPATHOLOGY RESULT NOTE    Results reviewed by ordering physician.  Called patient to personally discuss results. Discussed results with patient.       Instructions for Clinical Derm Team:   (remember to route Result Note to appropriate staff):    Call patient and schedule for cryotherapy appt    Result & Plan by Specimen:    Specimen A: benign  Plan: reassured, benign      Specimen B: benign  Plan: clinic appointment for liquid nitrogen        Status: Final result      Visible to patient: Yes (not seen)      Dx: Neoplasm of uncertain behavior of skin    0 Result Notes     Component   Case Report  Surgical Pathology Report                         Case: [id number]                                  Authorizing Provider:  Dawit Price MD            Collected:           06/21/2024 1555              Ordering Location:     [facility]      Received:            06/21/2024 [facility]                                     [place]                                                                      Pathologist:           Cole Polanco MD                                                          Specimens:   A) - Skin, Other, A. Left upper chest                                                               B) - Skin, Other, B. Mid chest                                                          Final Diagnosis  A. Skin, left upper chest, shave biopsy:    Findings of trauma/irritation (see note).    Note: The histopathologic findings are non-specific. Clinical pathologic correlation is advised. SOX10 and CK AE1/AE3 immmunostains were reviewed. Multiple levels examined.  If the lesion were to progress or persist, additional sampling should be sought.      B. Skin, mid chest, shave biopsy:    Consistent with PROLIFERATIVE ACTINIC KERATOSIS, inflamed (see note).    Note: If the lesion were to progress or persist, additional sampling should be sought. SOX10 immunostain was reviewed. Multiple levels  "examined.      r          Electronically signed by Cole Polanco MD on 6/26/2024 at  3:25 PM  Additional Information   All reported additional testing was performed with appropriately reactive controls.  These tests were developed and their performance characteristics determined by [facility] or appropriate performing facility, though some tests may be performed on tissues which have not been validated for performance characteristics (such as staining performed on alcohol exposed cell blocks and decalcified tissues).  Results should be interpreted with caution and in the context of the patients' clinical condition. These tests may not be cleared or approved by the U.S. Food and Drug Administration, though the FDA has determined that such clearance or approval is not necessary. These tests are used for clinical purposes and they should not be regarded as investigational or for research. This laboratory has been approved by [place] 88, designated as a high-complexity laboratory and is qualified to perform these tests.  .  Gross Description   A. The specimen is received in formalin, labeled with the patient's name and hospital number, and is designated \" left upper chest.\"  The specimen consists of a tan shave of skin that is 0.5 x 0.3 x 0.1 cm.  The surface is a white lesion that is 0.1 x 0.1 cm.  The deep resection margin is inked green, and the skin surface is inked red.  The specimen is bisected and submitted on sponge.  Total 1 cassette.  B.  The specimen is received in formalin, labeled with the patient's name and hospital number, and is designated \" mid chest.\"  The specimen consists of a tan shave of skin that is 0.6 x 0.5 x 0.1 cm.  The surface is a white macular lesion that is 0.4 x 0.4 cm.  The deep resection margin is inked green, and the skin surface is inked red.  The specimen is bisected and submitted on sponge.  Total 1 cassette.  Note: The estimated total formalin fixation time " based upon information provided by the submitting clinician and the standard processing schedule is under 72 hours.  TStevens  Clinical Information   ATTENTION:  DERMPATH GROUP    SPECIMEN A; Skin; Anatomic Location: Left upper chest ; Procedure/Protocol: Skin Specimen (submit in FORMALIN):Tangential Biopsy (includes shave, scoop, saucerization, curette) (CPT 94618; each additional tangential biopsy is CPT 31507)  54 y.o. year old  Female with a Morphological Description:0.5cm, pearly pink plaque  Differential Diagnosis and/or Specific Clinical Question:R/O BCC        SPECIMEN B; Skin; Anatomic Location: Mid chest ; Procedure/Protocol: Skin Specimen (submit in FORMALIN):Tangential Biopsy (includes shave, scoop, saucerization, curette) (CPT 35934; each additional tangential biopsy is CPT 07822)  54 y.o. year old  Female with a Morphological Description:0.5cm pearly pink plaque  Differential Diagnosis and/or Specific Clinical Question:R/O BCC  Resulting Agency [facility]          Specimen Collected: 06/21/24  3:55 PM Last Resulted: 06/26/24  3:25 PM

## 2024-08-08 ENCOUNTER — TELEPHONE (OUTPATIENT)
Dept: PLASTIC SURGERY | Facility: CLINIC | Age: 54
End: 2024-08-08

## 2024-08-08 NOTE — TELEPHONE ENCOUNTER
Called patient in regards to rescheduling her 12/11 appt with Kortney as Kortney will no longer be here.  Patient states she will call as it gets closer to December if she wishes to reschedule at all.

## 2024-08-30 ENCOUNTER — OFFICE VISIT (OUTPATIENT)
Dept: DERMATOLOGY | Facility: CLINIC | Age: 54
End: 2024-08-30
Payer: OTHER GOVERNMENT

## 2024-08-30 VITALS — WEIGHT: 121 LBS | HEIGHT: 64 IN | BODY MASS INDEX: 20.66 KG/M2

## 2024-08-30 DIAGNOSIS — L57.0 KERATOSIS, ACTINIC: Primary | ICD-10-CM

## 2024-08-30 PROCEDURE — 17000 DESTRUCT PREMALG LESION: CPT | Performed by: NURSE PRACTITIONER

## 2024-08-30 RX ORDER — DICLOFENAC SODIUM 75 MG/1
75 TABLET, DELAYED RELEASE ORAL
COMMUNITY
Start: 2024-08-16 | End: 2024-09-15

## 2024-08-30 NOTE — PROGRESS NOTES
"[facility] Note     Patient Name: Laura Rhoades  Encounter Date: 08/30/2024     Have you been cared for by a [facility] Dermatologist in the last 3 years and, if so, which description applies to you?    Yes.  I have been here within the last 3 years, and my medical history has NOT changed since that time.  I am FEMALE/of child-bearing potential.    REVIEW OF SYSTEMS:  Have you recently had or currently have any of the following? No changes in my recent health.   PAST MEDICAL HISTORY:  Have you personally ever had or currently have any of the following?  If \"YES,\" then please provide more detail. No changes in my medical history.   HISTORY OF IMMUNOSUPPRESSION: Do you have a history of any of the following:  Systemic Immunosuppression such as Diabetes, Biologic or Immunotherapy, Chemotherapy, Organ Transplantation, Bone Marrow Transplantation or Prednisone?  No     Answering \"YES\" requires the addition of the dotphrase \"IMMUNOSUPPRESSED\" as the first diagnosis of the patient's visit.   FAMILY HISTORY:  Any \"first degree relatives\" (parent, brother, sister, or child) with the following?    No changes in my family's known health.   PATIENT EXPERIENCE:    Do you want the Dermatologist to perform a COMPLETE skin exam today including a clinical examination under the \"bra and underwear\" areas?  NO  If necessary, do we have your permission to call and leave a detailed message on your Preferred Phone number that includes your specific medical information?  Yes      No Known Allergies   Current Outpatient Medications:     ALPRAZolam (XANAX) 0.5 mg tablet, , Disp: , Rfl:     DULoxetine (CYMBALTA) 20 mg capsule, Take 20 mg by mouth daily, Disp: , Rfl:     estradiol (ESTRACE) 1 mg tablet, , Disp: , Rfl:     gabapentin (NEURONTIN) 100 mg capsule, , Disp: , Rfl:     phentermine (ADIPEX-P) 37.5 MG tablet, Take 37.5 mg by mouth daily before breakfast, Disp: , Rfl:     traZODone (DESYREL) 100 mg tablet, , Disp: , " "Rfl:           Whom besides the patient is providing clinical information about today's encounter?   NO ADDITIONAL HISTORIAN (patient alone provided history)    Physical Exam and Assessment/Plan by Diagnosis:    ACTINIC KERATOSIS f/u     Physical Exam:  Anatomic Location: mid chest  Morphologic Description: pink papule    Additional History of Present Condition:  patient is present for cryotherapy of biopsy proven AK.   History of bleeding from this lesion? NO  History of pain, tenderness, and/or itching within this lesion? NO  Personal history of skin cancer? NO  Family history of skin cancer? NO  Previous treatment to the same site? NO    Plan:  PRESCRIPTION MANAGEMENT:  Several topical management options and their side effects were discussed including \"field therapies\" such as Efudex (5-fluorouracil) and/or Aldara (imiquimod). Efudex may be used alone or in combination with Calcipotriene. Begin treatment once regions that have been sprayed with liquid nitrogen are healed. Redness and irritation are to be expected within a few days of field therapy treatment and should resolve within 1 to 2 weeks following treatment. Do NOT cover the treatment areas with bandages. Use a sunscreen with an SPF 50+ while using field therapy.  We discussed the pre-cancerous nature of the condition. Actinic keratosis is found on sun-damaged skin and there is small risk that the condition could turn into a skin cancer called squamous cell carcinoma. There is no risk of actinic keratosis turning into melanoma.  Proliferative actinic keratosis tends to be resistant against standard treatments, including topical therapies and cryotherapy. It has a tendency to develop into infiltrative squamous cell carcinoma. Excision may be considered.  We discussed sun protection measures, including using sunscreen with an SPF 50+ year round, avoiding the sun, and wearing protective clothing such as long sleeves and pants when out in the sun.  Continue " to monitor clinically for signs of recurrence. Discussed with patient the importance of keeping up to date with full body skin exams.  Cryotherapy performed in the office (See Procedure Note). We discussed that a hypopigmentation or scar may form in the region following cryotherapy.     PROCEDURES PERFORMED TODAY ASSOCIATED WITH THIS CONDITION:          Cryotherapy: PROCEDURE:  DESTRUCTION OF PRE-MALIGNANT LESIONS  After a thorough discussion of treatment options and risk/benefits/alternatives (including but not limited to local pain, scarring, dyspigmentation, blistering, and possible superinfection), verbal and written consent were obtained and the aforementioned lesions were treated on with cryotherapy using liquid nitrogen x 1 cycle for 5-10 seconds.    TOTAL NUMBER of 1 pre-malignant lesions were treated today on the ANATOMIC LOCATION: mid chest.     The patient tolerated the procedure well, and after-care instructions were provided.         MEDICAL DECISION MAKING  Treatment Goal:  Resolution of this ACUTE, UNCOMPLICATED condition.       A recent or new short-term problem for which treatment is CONSIDERED OR INITIATED. There is little to no risk of mortality with treatment, and full recovery without functional impairment is expected.  Diagnosis or treatment significantly limited by the following social determinants of health:  NONE IDENTIFIED             Scribe Attestation      I,:  Anna Gutiérrez am acting as a scribe while in the presence of the attending physician.:       I,:  CHAS Lambert personally performed the services described in this documentation    as scribed in my presence.:

## 2024-12-13 ENCOUNTER — TELEPHONE (OUTPATIENT)
Age: 54
End: 2024-12-13

## 2024-12-13 NOTE — TELEPHONE ENCOUNTER
Caller: Patient     Doctor: Esther     Reason for call: Asked for an estimate of when she can get surgery, patients first appointment is on 1/3    Call back#: [phone]

## 2024-12-16 NOTE — TELEPHONE ENCOUNTER
Caller: Laura    Doctor: Esther    Reason for call: Patient is calling back regarding call from last week . She wants to get an estimate on possible SX as she needs to arrange care during her recovery  Please advise patient  Thank you    Call back#: [phone]

## 2024-12-16 NOTE — TELEPHONE ENCOUNTER
Returned call to patient and explained that Dr. Santana is currently scheduling for mid-to late-March with his surgeries.  Pt has an appt on 1/3 at [facility] to discuss possible surgery.

## 2025-01-03 ENCOUNTER — OFFICE VISIT (OUTPATIENT)
Dept: OBGYN CLINIC | Facility: CLINIC | Age: 55
End: 2025-01-03
Payer: OTHER GOVERNMENT

## 2025-01-03 VITALS — BODY MASS INDEX: 20.32 KG/M2 | WEIGHT: 119 LBS | HEIGHT: 64 IN

## 2025-01-03 DIAGNOSIS — M25.562 CHRONIC PAIN OF LEFT KNEE: Primary | ICD-10-CM

## 2025-01-03 DIAGNOSIS — M76.52 PATELLAR TENDINITIS OF LEFT KNEE: ICD-10-CM

## 2025-01-03 DIAGNOSIS — G89.29 CHRONIC PAIN OF LEFT KNEE: Primary | ICD-10-CM

## 2025-01-03 DIAGNOSIS — M22.2X2 PATELLOFEMORAL SYNDROME OF LEFT KNEE: ICD-10-CM

## 2025-01-03 PROCEDURE — 99204 OFFICE O/P NEW MOD 45 MIN: CPT | Performed by: ORTHOPAEDIC SURGERY

## 2025-01-03 PROCEDURE — 20610 DRAIN/INJ JOINT/BURSA W/O US: CPT | Performed by: ORTHOPAEDIC SURGERY

## 2025-01-03 RX ORDER — METHYLPREDNISOLONE ACETATE 40 MG/ML
2 INJECTION, SUSPENSION INTRA-ARTICULAR; INTRALESIONAL; INTRAMUSCULAR; SOFT TISSUE
Status: COMPLETED | OUTPATIENT
Start: 2025-01-03 | End: 2025-01-03

## 2025-01-03 RX ORDER — BUPIVACAINE HYDROCHLORIDE 2.5 MG/ML
2 INJECTION, SOLUTION INFILTRATION; PERINEURAL
Status: COMPLETED | OUTPATIENT
Start: 2025-01-03 | End: 2025-01-03

## 2025-01-03 RX ORDER — KETOROLAC TROMETHAMINE 30 MG/ML
30 INJECTION, SOLUTION INTRAMUSCULAR; INTRAVENOUS
Status: COMPLETED | OUTPATIENT
Start: 2025-01-03 | End: 2025-01-03

## 2025-01-03 RX ORDER — CELECOXIB 100 MG/1
100 CAPSULE ORAL 2 TIMES DAILY
Qty: 60 CAPSULE | Refills: 1 | Status: SHIPPED | OUTPATIENT
Start: 2025-01-03

## 2025-01-03 RX ADMIN — BUPIVACAINE HYDROCHLORIDE 2 ML: 2.5 INJECTION, SOLUTION INFILTRATION; PERINEURAL at 10:00

## 2025-01-03 RX ADMIN — KETOROLAC TROMETHAMINE 30 MG: 30 INJECTION, SOLUTION INTRAMUSCULAR; INTRAVENOUS at 10:00

## 2025-01-03 RX ADMIN — METHYLPREDNISOLONE ACETATE 2 ML: 40 INJECTION, SUSPENSION INTRA-ARTICULAR; INTRALESIONAL; INTRAMUSCULAR; SOFT TISSUE at 10:00

## 2025-01-03 NOTE — PROGRESS NOTES
Orthopedics          Laura Rhoades 54 y.o. female MRN: [id number]      Chief Complaint:   left knee pain    HPI:   54 y.o.female complaining of left knee pain.  She presents office today regarding left knee pain.  Patient states she has been having left knee pain since she tore her ACL while skiing and had ACL reconstruction and revision in [facility] in 2019.  Patient states she did have nerve pain following the procedure for which she was treated with spinal cord stimulator and medications given her mild pain relief however pain in her left knee persisted.  Patient has had multiple treatments including acupuncture and physical therapy with minimal pain relief she states medications give her only mild pain relief.  She localized the pain to her left knee pain is worse with activity mildly relieved with rest she denies any swelling decreased motion or instability of her left knee.  She has had corticosteroid injection as well as viscosupplementation injections in her left knee with no pain relief in the past.  She states she is limited in her activities due to her left knee pain.                Review Of Systems:   Skin: Normal  Neuro: See HPI  Musculoskeletal: See HPI  All other systems reviewed and are negative    Past Medical History:   Past Medical History:   Diagnosis Date    Anxiety        Past Surgical History:   Past Surgical History:   Procedure Laterality Date    APPENDECTOMY      HYSTERECTOMY      KNEE SURGERY Left     x4    SPINAL CORD STIMULATOR IMPLANT         Family History:  Family history reviewed and non-contributory  History reviewed. No pertinent family history.      Social History:  Social History     Socioeconomic History    Marital status: /Civil Union     Spouse name: None    Number of children: None    Years of education: None    Highest education level: None   Occupational History    None   Tobacco Use    Smoking status: Never    Smokeless tobacco: Never   Vaping Use    Vaping  status: Never Used   Substance and Sexual Activity    Alcohol use: Yes     Alcohol/week: 4.0 standard drinks of alcohol     Types: 4 Glasses of wine per week    Drug use: Never    Sexual activity: Yes     Partners: Female     Birth control/protection: Post-menopausal, None   Other Topics Concern    None   Social History Narrative    None     Social Drivers of Health     Financial Resource Strain: Not on file   Food Insecurity: Not on file   Transportation Needs: Not on file   Physical Activity: Not on file   Stress: Not on file   Social Connections: Not on file   Intimate Partner Violence: Not on file   Housing Stability: Not on file       Allergies:   Allergies   Allergen Reactions    Bupropion Rash       Labs:  0   Lab Value Date/Time    PT 13.1 11/07/2024 1041    INR 1.0 11/07/2024 1041    CRP <3.0 08/06/2019 1043       Meds:    Current Outpatient Medications:     celecoxib (CeleBREX) 100 mg capsule, Take 1 capsule (100 mg total) by mouth 2 (two) times a day, Disp: 60 capsule, Rfl: 1    ALPRAZolam (XANAX) 0.5 mg tablet, , Disp: , Rfl:     diclofenac (VOLTAREN) 75 mg EC tablet, Take 75 mg by mouth, Disp: , Rfl:     DULoxetine (CYMBALTA) 20 mg capsule, Take 20 mg by mouth daily, Disp: , Rfl:     estradiol (ESTRACE) 1 mg tablet, , Disp: , Rfl:     gabapentin (NEURONTIN) 100 mg capsule, , Disp: , Rfl:     phentermine (ADIPEX-P) 37.5 MG tablet, Take 37.5 mg by mouth daily before breakfast, Disp: , Rfl:     traZODone (DESYREL) 100 mg tablet, , Disp: , Rfl:     Body mass index is 20.43 kg/m².  Wt Readings from Last 3 Encounters:   01/03/25 54 kg (119 lb)   08/30/24 54.9 kg (121 lb)   06/21/24 57.2 kg (126 lb)     Physical Exam:   There were no vitals filed for this visit.    General Appearance:    Alert, cooperative, no distress, appears stated age   Head:    Normocephalic, without obvious abnormality, atraumatic   Eyes:    conjunctiva/corneas clear, both eyes        Nose:   Nares normal, septum midline, no drainage     Throat:   Lips normal; teeth and gums normal   Neck:    symmetrical, trachea midline, ;     thyroid:  no enlargement/   Back:     Symmetric, no curvature, ROM normal   Lungs:   No audible wheezing or labored breathing   Chest Wall:    No tenderness or deformity    Heart:    Regular rate and rhythm               Pulses:   2+ and symmetric all extremities   Skin:   Skin color, texture, turgor normal, no rashes or lesions   Neurologic:   normal strength, sensation and reflexes     throughout       Musculoskeletal: left lower extremity  On examination of the left knee there is no effusion no erythema no laceration no abrasion no ecchymosis, well-healed anterior and lateral scars.  Range of motion full active extension flexion greater than 120°.   pain on palpation medial and lateral joint lines, pes anserine bursa region, distal iliotibial band.  No pain or laxity with varus or valgus stress.  Quadriceps, hamstring strength 5/5.  Decreased sensation distal to anterior medial incisions sensation intact, distal pulses present.    Radiology:   I personally reviewed the films.  X-rays left knee shows evidence of surgical buttons the proximal tibia and distal femur mild subchondral sclerosis noted on the tibial aspect of the medial compartment    Large joint arthrocentesis: L knee  Universal Protocol:  Consent: Verbal consent obtained.  Consent given by: patient  Patient understanding: patient states understanding of the procedure being performed  Site marked: the operative site was marked  Patient identity confirmed: verbally with patient  Supporting Documentation  Indications: pain   Procedure Details  Location: knee - L knee  Needle size: 22 G  Approach: anterolateral  Medications administered: 2 mL bupivacaine 0.25 %; 2 mL methylPREDNISolone acetate 40 mg/mL; 30 mg ketorolac 30 mg/mL    Patient tolerance: patient tolerated the procedure well with no immediate  complications        _*_*_*_*_*_*_*_*_*_*_*_*_*_*_*_*_*_*_*_*_*_*_*_*_*_*_*_*_*_*_*_*_*_*_*_*_*_*_*_*_*    Assessment:  54 y.o.female with left knee pain, patellofemoral syndrome    Plan:   Weight bearing as tolerated  left lower extremity  Celebrex for pain control  Physical therapy aggressive range of motion stretching strengthening specifically VMO strengthening exercises and extensor mechanism stretching  Activity as tolerated  Follow up in 4 months or sooner if needed

## 2025-01-08 ENCOUNTER — EVALUATION (OUTPATIENT)
Dept: PHYSICAL THERAPY | Facility: CLINIC | Age: 55
End: 2025-01-08
Payer: OTHER GOVERNMENT

## 2025-01-08 DIAGNOSIS — M25.562 CHRONIC PAIN OF LEFT KNEE: Primary | ICD-10-CM

## 2025-01-08 DIAGNOSIS — M22.2X2 PATELLOFEMORAL SYNDROME OF LEFT KNEE: ICD-10-CM

## 2025-01-08 DIAGNOSIS — G89.29 CHRONIC PAIN OF LEFT KNEE: Primary | ICD-10-CM

## 2025-01-08 PROCEDURE — 97112 NEUROMUSCULAR REEDUCATION: CPT | Performed by: PHYSICAL THERAPIST

## 2025-01-08 PROCEDURE — 97110 THERAPEUTIC EXERCISES: CPT | Performed by: PHYSICAL THERAPIST

## 2025-01-08 PROCEDURE — 97161 PT EVAL LOW COMPLEX 20 MIN: CPT | Performed by: PHYSICAL THERAPIST

## 2025-01-08 NOTE — PROGRESS NOTES
PT Evaluation     Today's date: 2025  Patient name: Laura Rhoades  : 1970  MRN: [id number]  Referring provider: Zaira Santana MD  Dx:   Encounter Diagnosis     ICD-10-CM    1. Chronic pain of left knee  M25.562 Ambulatory referral to Physical Therapy    G89.29       2. Patellofemoral syndrome of left knee  M22.2X2 Ambulatory referral to Physical Therapy                     Assessment  Impairments: abnormal gait, abnormal or restricted ROM, activity intolerance, impaired physical strength, lacks appropriate home exercise program, pain with function, poor posture , poor body mechanics, activity limitations and endurance    Assessment details: Pt presents with signs and symptoms synonymous of admitting diagnosis as well as possible knee flexion derangement with DP of repeated knee flexion with IR.  Pt presents with pain, decreased strength, decreased range, flexibility, as well as tolerance to activity and function performance of CKC based activities.  Can also implement desensitization techniques as needed.  Pt would benefit skilled PT intervention in order to address these impairments in order to be able to perform all desired activities with minimal to nil symptom exacerbation.  If she fails to find improvement over the next 3-4 weeks, appropriate referral will be performed.  Thank you very much for this kind and motivated referral.     Understanding of Dx/Px/POC: good     Prognosis: good    Goals  STG 4 Weeks:  Decrease pain at worst to 7  Improve range to 135 flex  Improve strength to sustain 5/5, able to elevate stairs with 2 UE support at end of day.    Independent with HEP  LTG 8 Weeks:  Decrease pain at worst to 4  Improve range to 145 flex  Improve strength to sustain 5/5, able to elevate stairs with 1 UE support at end of day.    Able to perform all desired activities with minimal to nil symptom exacerbation      Plan  Patient would benefit from: skilled physical therapy  Planned modality  interventions: thermotherapy: hydrocollator packs and cryotherapy    Planned therapy interventions: joint mobilization, manual therapy, neuromuscular re-education, abdominal trunk stabilization, activity modification, balance, behavior modification, body mechanics training, flexibility, functional ROM exercises, gait training, graded activity, graded exercise, home exercise program, transfer training, therapeutic training, therapeutic exercise, therapeutic activities, stretching and strengthening    Frequency: 2x week  Duration in weeks: 10  Treatment plan discussed with: patient      Subjective Evaluation    History of Present Illness  Date of onset: 1/8/2025  Mechanism of injury: Pt is a 54 yofemale who presents today stating she suffered a skiing injury in 2019 with indicative ACL tear and had surgery to reconstruct.  The surgery wasn't a success due to cadaver item being too large.  PT was unsuccessful, did have residual nerve pain.  She had to follow up with Dr. Oneill of [facility] for a secondary ACL reconstruction.  Recover was successful, but nerve pain was present.  Pt in 2022 she had scope procedure with Dr. Oneill without success.  Nerve pain was still present. Had nerve ablation of the L knee in 2023 without success.   Has had removal of a pin that had begun to stick out of L knee and had this clip it earlier this year.   Dr. Oneill recommended TKA in October.    Had prescription of gabapentin and cymbalta from Dr. Adan of [facility].    L Knee treatment Ultrasound, Laser, Acupuncture  appeared to assist with knee for 40% x 1.5 months and then received spinal cord stimulator November of 2024.  Her symptoms have improved since this.   Currently exercises with [employer] and exercises in her home.  Pt reports that she does have long history of low back pain secondary to twisted vertebrate x 2 secondary to a fall off a dive bard.  Occasional aches in her back but no leg symptoms.   Met with Dr. Santana who  indicated not appropriate for TKA at this time, generalized strengthening should assist and thus presents today.  Has had several injections without success.  Is sensitive to touch.   Pt reports with rest symptoms can be 0/10, but at worst can be 10/10 with bending, squatting and going down more so than up elevations.  Pt reports pain pain is above, below patella and outside of knee.  R LE is going well.  Pt reports that no change in bowel or bladder.    Goals are to be able to improve pain levels, improve elevations, improve ability to participate in recreational exercise, golfing, sleeping can be good on some days but inconsistent, not awakening due to pain.    Denies locking or buckling.    Wears compression sleeve with success most of the time.   Quality of life: good    Patient Goals  Patient goals for therapy: increased strength, increased motion, improved balance, decreased pain and return to sport/leisure activities    Pain  Current pain ratin  At best pain ratin  At worst pain rating: 10  Quality: dull ache  Relieving factors: change in position, rest and medications  Aggravating factors: standing, walking, stair climbing and lifting  Progression: improved      Diagnostic Tests  X-ray: normal  Treatments  Previous treatment: physical therapy, medication and injection treatment      Objective     Active Range of Motion   Left Knee   Flexion: 125 (PT OP) degrees   Extension: 0 (PT OP) degrees     Right Knee   Flexion: 150 degrees   Extension: 0 degrees     Additional Active Range of Motion Details  Forward head, rounded shoulders, Lordosis  Genu L > R, mild antalgia L > R.    B/L Sensation intact to L3,4,5,S1,S2  (medial malleolus).    DTR Patella Allenhurst NP   Postural correction  NP  LS ROM    Flex  Ext  SB R  SB L  LE Strength  Hip   L Flex Ext Abd Add all 5  R Flex Ext Abd Add all 5  LE Screen b/l 5/5 knee pain with flex/ext L, Ankle/DF Gtoe 5/5.    Joint Mobility  Palpation  Sts    Squat   "NB  Walking mild better    LE Repeated Movements  Flex/Flex Unloaded Pt OP I NW improved flexion.  Trial of IR flexion with pt improved walking mildly improved ROM to 145/50 PT OP.                 Precautions: Dr. Adan [facility] Spinal Stimulator November 2024.        Manuals 1/8            Desensitization trial? Nv? With towel?            Eventual progression of resistance in OKC and CKC based intervention.                                        Neuro Re-Ed             Education and progression 10 min            SLS UE Support             NBOS EC             NBOS EO Foam             Eventual Hip abd/ext                                       Ther Ex             Knee Flexion, Pt OP 4 x 10            Knee Flexion IR Pt OP 4 x 10            Progression?                                                    HR/TR             4-6\" Step trial  Eccentrics primarily.             Ther Activity             ROM 0-125 to 145            PT OP Ext No pain            PT OP Flex No better pain            Other concerns?             Elevation trial?             Modalities                                            "

## 2025-01-10 ENCOUNTER — OFFICE VISIT (OUTPATIENT)
Dept: PHYSICAL THERAPY | Facility: CLINIC | Age: 55
End: 2025-01-10
Payer: OTHER GOVERNMENT

## 2025-01-10 DIAGNOSIS — M25.562 CHRONIC PAIN OF LEFT KNEE: Primary | ICD-10-CM

## 2025-01-10 DIAGNOSIS — G89.29 CHRONIC PAIN OF LEFT KNEE: Primary | ICD-10-CM

## 2025-01-10 DIAGNOSIS — M22.2X2 PATELLOFEMORAL SYNDROME OF LEFT KNEE: ICD-10-CM

## 2025-01-10 PROCEDURE — 97110 THERAPEUTIC EXERCISES: CPT | Performed by: PHYSICAL THERAPIST

## 2025-01-10 PROCEDURE — 97140 MANUAL THERAPY 1/> REGIONS: CPT | Performed by: PHYSICAL THERAPIST

## 2025-01-10 NOTE — PROGRESS NOTES
"Daily Note     Today's date: 1/10/2025  Patient name: Laura Rhoades  : 1970  MRN: [id number]  Referring provider: Zaira Santana MD  Dx:   Encounter Diagnosis     ICD-10-CM    1. Chronic pain of left knee  M25.562     G89.29       2. Patellofemoral syndrome of left knee  M22.2X2                      Subjective:  Pt presents today stating that she had met with her spinal stimulator specialist who changed her frequency and she has already seen improvement in how her knee is feeling.  Has been compliant with HEP.        Objective: See treatment diary below      Assessment:  ROM improving significantly.  Proceeded with all CKC based activities without symptom exacerbation except of elevations.  Fair eccentrics, pain is evident.  Continue to progress as able.      Precautions: Dr. Adan [facility] Spinal Stimulator 2024.        Manuals 1/8 1/10           Desensitization trial? Nv? With towel? 8 min TAS           Eventual progression of resistance in OKC and CKC based intervention.                                        Neuro Re-Ed             Education and progression 10 min            SLS UE Support  20\" x 4           NBOS EC  Foam 1 min                        Eventual Hip abd/ext                                       Ther Ex             Knee Flexion, Pt OP 4 x 10            Knee Flexion IR Pt OP 4 x 10 4 x 10           Progression?                                                    HR/TR  SL 2 x 10           4-6\" Step trial  Eccentrics primarily.             Ther Activity             ROM 0-125 to 145 0-145*           PT OP Ext No pain No pain           PT OP Flex No better pain Mild pain.            Other concerns?             Elevation trial?  4\" x 10           Modalities                                            "

## 2025-01-22 ENCOUNTER — TELEPHONE (OUTPATIENT)
Age: 55
End: 2025-01-22

## 2025-01-22 ENCOUNTER — OFFICE VISIT (OUTPATIENT)
Dept: PHYSICAL THERAPY | Facility: CLINIC | Age: 55
End: 2025-01-22
Payer: OTHER GOVERNMENT

## 2025-01-22 DIAGNOSIS — G89.29 CHRONIC PAIN OF LEFT KNEE: Primary | ICD-10-CM

## 2025-01-22 DIAGNOSIS — M25.562 CHRONIC PAIN OF LEFT KNEE: Primary | ICD-10-CM

## 2025-01-22 DIAGNOSIS — M17.12 ARTHRITIS OF LEFT KNEE: Primary | ICD-10-CM

## 2025-01-22 DIAGNOSIS — M22.2X2 PATELLOFEMORAL SYNDROME OF LEFT KNEE: ICD-10-CM

## 2025-01-22 PROCEDURE — 97110 THERAPEUTIC EXERCISES: CPT | Performed by: PHYSICAL THERAPIST

## 2025-01-22 PROCEDURE — 97112 NEUROMUSCULAR REEDUCATION: CPT | Performed by: PHYSICAL THERAPIST

## 2025-01-22 NOTE — TELEPHONE ENCOUNTER
Caller: Patient  Doctor/office: [facility]  CB#: [phone]      Patient called to start auth/delivery for VISCO(Gel) injections.    Body Part: Left Knee  Pain Level (scale 1-10): 5  Date of last Gel Injection: 7/29/2024  Did the last injection work well for you? Yes - Patient stated she had a stimulator put in and would like to try visco again, if you are comfortable with this can you please put in an order? I see it says follow up in 4 months or as needed but she would like to order visco injections. See in basket message.      Educated patient on Visco procedure. Auth team will review insurance and process the request appropriately. Patient will be contacted if the have any questions and when ready to schedule.

## 2025-01-22 NOTE — PROGRESS NOTES
"Daily Note     Today's date: 2025  Patient name: Laura Rhoades  : 1970  MRN: [id number]  Referring provider: Zaira Santana MD  Dx:   Encounter Diagnosis     ICD-10-CM    1. Chronic pain of left knee  M25.562     G89.29       2. Patellofemoral syndrome of left knee  M22.2X2                        Subjective:  Pt presents today stating that she has been doing okay.  Participating in recreational exercise without significant issue, but stairs are still worse at the end of the day, but she feels as though she is trending better.   Desires more endurance and strength in her L LE.        Objective: See treatment diary below      Assessment:  Proceeded with CKC based intervention without elevation with pain only fatigue. Continue to progress as able.   RE nv     Precautions: Dr. Adan [facility] Spinal Stimulator 2024.        Manuals 1/8 1/10 1/22          Desensitization trial? Nv? With towel? 8 min TAS 8 min TAS          Eventual progression of resistance in OKC and CKC based intervention.                                        Neuro Re-Ed             Education and progression 10 min            SLS UE Support  20\" x 4 20\" x 4 Foam?          NBOS EC  Foam 1 min Foam 1 min                       Eventual Hip abd/ext   RTB 2 x 10          Tband Side Step   Nv?                       Ther Ex             Knee Flexion, Pt OP 4 x 10            Knee Flexion IR Pt OP 4 x 10 4 x 10 4 x 10          Progression?                                                    HR/TR  SL 2 x 10 SL 3 x 10          4-6\" Step trial  Eccentrics primarily.   4\" x 20          Ther Activity             ROM 0-125 to 145 0-145* 0-145*          PT OP Ext No pain No pain           PT OP Flex No better pain Mild pain.            LP + HR   60# nv?          Elevation trial?  4\" x 10           Modalities                                            "

## 2025-01-27 ENCOUNTER — EVALUATION (OUTPATIENT)
Dept: PHYSICAL THERAPY | Facility: CLINIC | Age: 55
End: 2025-01-27
Payer: OTHER GOVERNMENT

## 2025-01-27 DIAGNOSIS — G89.29 CHRONIC PAIN OF LEFT KNEE: Primary | ICD-10-CM

## 2025-01-27 DIAGNOSIS — M22.2X2 PATELLOFEMORAL SYNDROME OF LEFT KNEE: ICD-10-CM

## 2025-01-27 DIAGNOSIS — M25.562 CHRONIC PAIN OF LEFT KNEE: Primary | ICD-10-CM

## 2025-01-27 PROCEDURE — 97112 NEUROMUSCULAR REEDUCATION: CPT | Performed by: PHYSICAL THERAPIST

## 2025-01-27 PROCEDURE — 97110 THERAPEUTIC EXERCISES: CPT | Performed by: PHYSICAL THERAPIST

## 2025-01-27 NOTE — PROGRESS NOTES
PT Evaluation     Today's date: 2025  Patient name: Laura Rhoades  : 1970  MRN: [id number]  Referring provider: Zaira Santana MD  Dx:   Encounter Diagnosis     ICD-10-CM    1. Chronic pain of left knee  M25.562     G89.29       2. Patellofemoral syndrome of left knee  M22.2X2                        Assessment  Impairments: abnormal gait, abnormal or restricted ROM, activity intolerance, impaired physical strength, lacks appropriate home exercise program, pain with function, poor posture , poor body mechanics, activity limitations and endurance    Assessment details: Pt is progressing well at this time.  They have demonstrated improvement in pain levels, strength, range, flexibility as well as overall tolerance to activity.  They have achieved all STGs sought out for them as well as by them.  They will benefit continued Skilled PT intervention in order to achieve all LTGs sought out for them as well as by them in order to perform all desired activities with minimal to nil symptom exacerbation.  Thank you very much for this kind referral.        Understanding of Dx/Px/POC: good     Prognosis: good    Goals  STG 4 Weeks:  Decrease pain at worst to 7 -met  Improve range to 135 flex -met  Improve strength to sustain 5/5, able to elevate stairs with 2 UE support at end of day.   -met  Independent with HEP -met  LTG 8 Weeks:  Decrease pain at worst to 4  Improve range to 145 flex  Improve strength to sustain 5/5, able to elevate stairs with 1 UE support at end of day.    Able to perform all desired activities with minimal to nil symptom exacerbation      Plan  Patient would benefit from: skilled physical therapy  Planned modality interventions: thermotherapy: hydrocollator packs and cryotherapy    Planned therapy interventions: joint mobilization, manual therapy, neuromuscular re-education, abdominal trunk stabilization, activity modification, balance, behavior modification, body mechanics training,  flexibility, functional ROM exercises, gait training, graded activity, graded exercise, home exercise program, transfer training, therapeutic training, therapeutic exercise, therapeutic activities, stretching and strengthening    Frequency: 2x week  Duration in weeks: 10  Treatment plan discussed with: patient      Subjective Evaluation    History of Present Illness  Date of onset: 2025  Mechanism of injury: Pt presents today stating that as a whole a pain at worst 7/10.  Has been mobile and active with HEP as well as recreational exercise.  Pt reports that she has been performing tasks around home, while sore, more manageable than it was 1-2 months.  Pt reports that elevations are still challenging especially at end of day but able to alternate upward with b/l UE support.  This is mildly improved from beginning of PT.  Pt reports that she has returned to recreational exercise, but would like not be as hesitant to perform all exercise hobbies.   She will be having hyaluronic acid injections beginning tomorrow with her return to surgeon.  Pt reports that her goals would be able to get back to a lifestyle where she is completely asymptomatic and pain free.  Reports that her initial focus is improving stairs, squats and functional tasks within her home.  Then ultimately if possible get to a point where she is entirely able to be asymptomatic with all activities.   Quality of life: good    Patient Goals  Patient goals for therapy: increased strength, increased motion, improved balance, decreased pain and return to sport/leisure activities    Pain  Current pain ratin  At best pain ratin  At worst pain ratin  Quality: dull ache  Relieving factors: change in position, rest and medications  Aggravating factors: standing, walking, stair climbing and lifting  Progression: improved      Diagnostic Tests  X-ray: normal  Treatments  Previous treatment: physical therapy, medication and injection  "treatment      Objective     Active Range of Motion   Left Knee   Flexion: 140 (PT ) degrees   Extension: 0 (PT OP) degrees     Right Knee   Flexion: 150 degrees   Extension: 0 degrees     Additional Active Range of Motion Details  Forward head, rounded shoulders, Lordosis  Genu L > R, mild antalgia L > R.    B/L Sensation intact to L3,4,5,S1,S2  (medial malleolus).    DTR Patella [place] NP   Postural correction  NP  LS ROM    Flex nil  Ext min  SB R nil  SB L nil  LE Strength  Hip   L Flex Ext Abd Add all 5  R Flex Ext Abd Add all 5  LE Screen b/l 5/5 knee pain with flex/ext L, Ankle/DF Gtoe 5/5.    Joint Mobility  Palpation  Sts    Squat  NB  Walking mild better  Elevations Fair (-) on eccentrics of L.     LE Repeated Movements  Flex/Flex Unloaded Pt OP I NW improved flexion.  Trial of IR flexion with pt improved walking mildly improved ROM to 145/50 PT OP.  (Remains)             Precautions: Dr. Adan [facility] Spinal Stimulator November 2024.        Manuals 1/8 1/10 1/22 1/27         Desensitization trial? Nv? With towel? 8 min TAS 8 min TAS 8 min TAS         Eventual progression of resistance in OKC and CKC based intervention.                                        Neuro Re-Ed             Education and progression 10 min            SLS UE Support  20\" x 4 20\" x 4 Foam20\" x 4         NBOS EC  Foam 1 min Foam 1 min Foam 1 min                      Eventual Hip abd/ext   RTB 2 x 10 RTB 2 x 10         Tband Side Step   Nv? RTB  4 laps                      Ther Ex             Knee Flexion, Pt OP 4 x 10            Knee Flexion IR Pt OP 4 x 10 4 x 10 4 x 10 4 x 10         Progression?                                       Squat    2 x 10         HR/TR  SL 2 x 10 SL 3 x 10 SL 3 x 10         4-6\" Step trial  Eccentrics primarily.   4\" x 20 4\" x 20         Ther Activity             ROM 0-125 to 145 0-145* 0-145* 0-145         PT OP Ext No pain No pain           PT OP Flex No better pain Mild pain.            LP + HR   " "60# nv? nv         Elevation trial?  4\" x 10           Modalities                                            "

## 2025-01-28 ENCOUNTER — HOSPITAL ENCOUNTER (OUTPATIENT)
Dept: RADIOLOGY | Facility: HOSPITAL | Age: 55
Discharge: HOME | End: 2025-01-28
Attending: ORTHOPAEDIC SURGERY
Payer: OTHER GOVERNMENT

## 2025-01-28 ENCOUNTER — PROCEDURE VISIT (OUTPATIENT)
Dept: OBGYN CLINIC | Facility: CLINIC | Age: 55
End: 2025-01-28
Payer: OTHER GOVERNMENT

## 2025-01-28 VITALS — HEIGHT: 64 IN | WEIGHT: 119 LBS | BODY MASS INDEX: 20.32 KG/M2

## 2025-01-28 DIAGNOSIS — M70.62 GREATER TROCHANTERIC BURSITIS OF BOTH HIPS: Primary | ICD-10-CM

## 2025-01-28 DIAGNOSIS — M25.551 BILATERAL HIP PAIN: ICD-10-CM

## 2025-01-28 DIAGNOSIS — M76.52 PATELLAR TENDINITIS OF LEFT KNEE: ICD-10-CM

## 2025-01-28 DIAGNOSIS — M17.12 ARTHRITIS OF LEFT KNEE: ICD-10-CM

## 2025-01-28 DIAGNOSIS — M25.552 BILATERAL HIP PAIN: ICD-10-CM

## 2025-01-28 DIAGNOSIS — M22.2X2 PATELLOFEMORAL SYNDROME OF LEFT KNEE: ICD-10-CM

## 2025-01-28 DIAGNOSIS — M70.61 GREATER TROCHANTERIC BURSITIS OF BOTH HIPS: Primary | ICD-10-CM

## 2025-01-28 PROCEDURE — 20610 DRAIN/INJ JOINT/BURSA W/O US: CPT | Performed by: ORTHOPAEDIC SURGERY

## 2025-01-28 PROCEDURE — 73521 X-RAY EXAM HIPS BI 2 VIEWS: CPT

## 2025-01-28 PROCEDURE — 99214 OFFICE O/P EST MOD 30 MIN: CPT | Performed by: ORTHOPAEDIC SURGERY

## 2025-01-28 RX ORDER — BUPIVACAINE HYDROCHLORIDE 2.5 MG/ML
1 INJECTION, SOLUTION INFILTRATION; PERINEURAL
Status: COMPLETED | OUTPATIENT
Start: 2025-01-28 | End: 2025-01-28

## 2025-01-28 RX ORDER — METHYLPREDNISOLONE ACETATE 40 MG/ML
2 INJECTION, SUSPENSION INTRA-ARTICULAR; INTRALESIONAL; INTRAMUSCULAR; SOFT TISSUE
Status: COMPLETED | OUTPATIENT
Start: 2025-01-28 | End: 2025-01-28

## 2025-01-28 RX ORDER — KETOROLAC TROMETHAMINE 30 MG/ML
30 INJECTION, SOLUTION INTRAMUSCULAR; INTRAVENOUS
Status: COMPLETED | OUTPATIENT
Start: 2025-01-28 | End: 2025-01-28

## 2025-01-28 RX ADMIN — BUPIVACAINE HYDROCHLORIDE 1 ML: 2.5 INJECTION, SOLUTION INFILTRATION; PERINEURAL at 13:15

## 2025-01-28 RX ADMIN — KETOROLAC TROMETHAMINE 30 MG: 30 INJECTION, SOLUTION INTRAMUSCULAR; INTRAVENOUS at 13:15

## 2025-01-28 RX ADMIN — METHYLPREDNISOLONE ACETATE 2 ML: 40 INJECTION, SUSPENSION INTRA-ARTICULAR; INTRALESIONAL; INTRAMUSCULAR; SOFT TISSUE at 13:15

## 2025-01-28 NOTE — PATIENT INSTRUCTIONS
STRENGTHENING:   Quadriceps:   Isometric Quad sets                        Progression for Quadriceps/VMO:  Hold at 45 degree angle (Picture #1)    Key: Slow & Intentional  Two ways to perform:  Start with 10 reps on each side maintaining neutral pelvis. *   Hold position for a 3-5 count  When form and exercise because less challenging; increase the REPITITIONS or DURATION your holding.    Perform Wall Squats below: 3 Sets of 10 Reps         Hold for 3-5 count          Progression for Quadriceps/VMO:  Use a ball hold between knees to activate VMO  Key: Slow & Intentional  Two ways to perform:  Start with 10 reps on each side maintaining neutral pelvis. *   Hold position for a 3-5 count  When form and exercise because less challenging; increase the REPITITIONS or DURATION your holding.  Perform the Wall Squat w/ball: 3 sets 10 reps        Hold for 3-5 count                        Progression:   Adding a Theraband for resistance while maintaining good form during a wall squat is more challenging.   Perform Wall squat w/Theraband:   3 sets 10 reps  Hold for a 3-5 count

## 2025-01-28 NOTE — PROGRESS NOTES
Assessment:  1. Greater trochanteric bursitis of both hips  Large joint arthrocentesis: bilateral greater trochanteric bursa      2. Bilateral hip pain  XR hips bilateral 2 vw w pelvis if performed      3. Arthritis of left knee        4. Patellofemoral syndrome of left knee        5. Patellar tendinitis of left knee            Plan:    Patient with left knee pain due to osteoarthritis, patellofemoral syndrome and bilateral greater trochanteric bursitis   Weightbearing as tolerated   Will hold off on the left knee Durolane injection today   XR bilateral hips were obtained and reviewed in the office today which show was normal   Provided patient with VMO quad strengthening exercises and greater troch stretching exercises   Continue therapy working on strengthening exercises,especially VMO strengthening exercises   Patient received bilateral greater troch bursitis steroid injection in the office today  She is to follow up in 3 months, if her left knee still is giving her discomfort, may consider administrating Dorleen injection at the next office visit          The above stated was discussed in layman's terms and the patient expressed understanding.  All questions were answered to the patient's satisfaction.         Subjective:   Laura Rhoades is a 54 y.o. female who presents today for left knee pain due to osteoarthritis.  Patient states her left knee pain has been improving since last office visit.  She has been attending physical therapy twice a week and also has been doing barre 3 classes daily.  She states she is having soreness over the anterior aspect of the left knee.  She states recently she had a spinal cord stimulator  implanted and states her left knee pain has been getting better.       Patient is also here today for evaluation for bilateral hip pain. She was previously treating at [facility] for bilateral greater trochanteric bursitis and was received steroid injections every 6 months with relief. She notes  soreness bilateral lateral hip.       Review of systems negative unless otherwise specified in HPI    Past Medical History:   Diagnosis Date    Anxiety        Past Surgical History:   Procedure Laterality Date    APPENDECTOMY      HYSTERECTOMY      KNEE SURGERY Left     x4    SPINAL CORD STIMULATOR IMPLANT         History reviewed. No pertinent family history.    Social History     Occupational History    Not on file   Tobacco Use    Smoking status: Never    Smokeless tobacco: Never   Vaping Use    Vaping status: Never Used   Substance and Sexual Activity    Alcohol use: Yes     Alcohol/week: 4.0 standard drinks of alcohol     Types: 4 Glasses of wine per week    Drug use: Never    Sexual activity: Yes     Partners: Female     Birth control/protection: Post-menopausal, None         Current Outpatient Medications:     ALPRAZolam (XANAX) 0.5 mg tablet, , Disp: , Rfl:     celecoxib (CeleBREX) 100 mg capsule, Take 1 capsule (100 mg total) by mouth 2 (two) times a day, Disp: 60 capsule, Rfl: 1    diclofenac (VOLTAREN) 75 mg EC tablet, Take 75 mg by mouth, Disp: , Rfl:     DULoxetine (CYMBALTA) 20 mg capsule, Take 20 mg by mouth daily, Disp: , Rfl:     estradiol (ESTRACE) 1 mg tablet, , Disp: , Rfl:     gabapentin (NEURONTIN) 100 mg capsule, , Disp: , Rfl:     phentermine (ADIPEX-P) 37.5 MG tablet, Take 37.5 mg by mouth daily before breakfast, Disp: , Rfl:     traZODone (DESYREL) 100 mg tablet, , Disp: , Rfl:     Allergies   Allergen Reactions    Bupropion Rash          There were no vitals filed for this visit.  Body mass index is 20.43 kg/m².  Wt Readings from Last 3 Encounters:   01/28/25 54 kg (119 lb)   01/03/25 54 kg (119 lb)   08/30/24 54.9 kg (121 lb)       Objective:            Physical Exam  Physical Exam:      General Appearance:    Alert, cooperative, no distress, appears stated age   Head:    Normocephalic, without obvious abnormality, atraumatic   Eyes:    conjunctiva/corneas clear, both eyes         Nose:    Nares normal, septum midline, no drainage    Throat:   Lips normal; teeth and gums normal   Neck:    symmetrical, trachea midline, ;     thyroid:  no enlargement/   Back:     Symmetric, no curvature, ROM normal   Lungs:   No audible wheezing or labored breathing   Chest Wall:    No tenderness or deformity    Heart:    Regular rate and rhythm                         Pulses:   2+ and symmetric all extremities   Skin:   Skin color, texture, turgor normal, no rashes or lesions   Neurologic:   normal strength, sensation and reflexes     throughout                       Ortho Exam  Right hip  Skin intact  No erythema or open wounds  Negative Stinchfield  Negative MARIO ALBERTO test  Negative FADDIR  Internal rotation 0 degrees  External rotation 35 degrees  + tenderness to palpation over the greater trochanteric region  NO Tenderness to palpation over the iliotibial band region  Neurovascularly Intact Distally      Right hip  Skin intact  No erythema or open wounds  Negative Stinchfield  Negative MARIO ALBERTO test  Negative FADDIR  Internal rotation 0 degrees  External rotation 35 degrees  +  tenderness to palpation over the greater trochanteric region  No Tenderness to palpation over the iliotibial band region  Neurovascularly Intact Distally        Left Knee  Surgical scar well healed   No erythema or open wounds  Mild effusion  Tenderness palpation of medial joint line  No lateral joint line tenderness  Near full extension  Full flexion  Patellar grind test +/-  Stable to varus and valgus stress  Negative posterior drawer  Negative Lachman test  Neurovascular intact distally     Diagnostics, reviewed and taken today if performed as documented:    The attending physician has personally reviewed the pertinent films in PACS and interpretation is as follows: XR bilateral hips demonstrates no acute fractures or dislocations, no acute osseous abnormalities       Procedures, if performed today:    Large joint arthrocentesis: bilateral  "greater trochanteric bursa  Universal Protocol:  procedure performed by consultantConsent: Verbal consent obtained.  Risks and benefits: risks, benefits and alternatives were discussed  Consent given by: patient  Time out: Immediately prior to procedure a \"time out\" was called to verify the correct patient, procedure, equipment, support staff and site/side marked as required.  Timeout called at: 1/28/2025 2:28 PM.  Patient understanding: patient states understanding of the procedure being performed  Site marked: the operative site was marked  Supporting Documentation  Indications: pain   Procedure Details  Location: hip - bilateral greater trochanteric bursa  Needle size: 22 G  Approach: lateral    Medications (Right): 1 mL bupivacaine 0.25 %; 30 mg ketorolac 30 mg/mL; 2 mL methylPREDNISolone acetate 40 mg/mLMedications (Left): 1 mL bupivacaine 0.25 %; 2 mL methylPREDNISolone acetate 40 mg/mL; 30 mg ketorolac 30 mg/mL   Patient tolerance: patient tolerated the procedure well with no immediate complications  Dressing:  Sterile dressing applied          Scribe Attestation      I,:  Frida Mccormack MA am acting as a scribe while in the presence of the attending physician.:       I,:  Zaira Santana MD personally performed the services described in this documentation    as scribed in my presence.:               Portions of the record may have been created with voice recognition software.  Occasional wrong word or \"sound a like\" substitutions may have occurred due to the inherent limitations of voice recognition software.  Read the chart carefully and recognize, using context, where substitutions have occurred.  "

## 2025-01-29 ENCOUNTER — APPOINTMENT (OUTPATIENT)
Dept: PHYSICAL THERAPY | Facility: CLINIC | Age: 55
End: 2025-01-29
Payer: OTHER GOVERNMENT

## 2025-01-31 ENCOUNTER — APPOINTMENT (OUTPATIENT)
Dept: PHYSICAL THERAPY | Facility: CLINIC | Age: 55
End: 2025-01-31
Payer: OTHER GOVERNMENT

## 2025-02-03 ENCOUNTER — APPOINTMENT (OUTPATIENT)
Dept: PHYSICAL THERAPY | Facility: CLINIC | Age: 55
End: 2025-02-03
Payer: OTHER GOVERNMENT

## 2025-02-05 ENCOUNTER — APPOINTMENT (OUTPATIENT)
Dept: PHYSICAL THERAPY | Facility: CLINIC | Age: 55
End: 2025-02-05
Payer: OTHER GOVERNMENT

## 2025-02-08 DIAGNOSIS — Z00.6 ENCOUNTER FOR EXAMINATION FOR NORMAL COMPARISON OR CONTROL IN CLINICAL RESEARCH PROGRAM: ICD-10-CM

## 2025-02-12 ENCOUNTER — OFFICE VISIT (OUTPATIENT)
Dept: PHYSICAL THERAPY | Facility: CLINIC | Age: 55
End: 2025-02-12
Payer: OTHER GOVERNMENT

## 2025-02-12 DIAGNOSIS — M22.2X2 PATELLOFEMORAL SYNDROME OF LEFT KNEE: ICD-10-CM

## 2025-02-12 DIAGNOSIS — G89.29 CHRONIC PAIN OF LEFT KNEE: Primary | ICD-10-CM

## 2025-02-12 DIAGNOSIS — M25.562 CHRONIC PAIN OF LEFT KNEE: Primary | ICD-10-CM

## 2025-02-12 PROCEDURE — 97140 MANUAL THERAPY 1/> REGIONS: CPT | Performed by: PHYSICAL THERAPIST

## 2025-02-12 PROCEDURE — 97112 NEUROMUSCULAR REEDUCATION: CPT | Performed by: PHYSICAL THERAPIST

## 2025-02-12 NOTE — PROGRESS NOTES
"Daily Note     Today's date: 2025  Patient name: Laura Rhoades  : 1970  MRN: [id number]  Referring provider: Zaira Santana MD  Dx:   Encounter Diagnosis     ICD-10-CM    1. Chronic pain of left knee  M25.562     G89.29       2. Patellofemoral syndrome of left knee  M22.2X2                      Subjective: Pt presents today stating that she is having some pain from an exercise class yesterday that was likely too intense for her but has been doing well otherwise.        Objective: See treatment diary below      Assessment: Avoided CKC based intervention due to pain and soreness from yesterdays session.  Continue to progress n.v as able.      Precautions: Dr. Adan [facility] Spinal Stimulator 2024.        Manuals 1/8 1/10 1/22 1/27 2/12        Desensitization trial? Nv? With towel? 8 min TAS 8 min TAS 8 min TAS 8 min TAS        Eventual progression of resistance in OKC and CKC based intervention.                                        Neuro Re-Ed             Education and progression 10 min            SLS UE Support  20\" x 4 20\" x 4 Foam20\" x 4 Foam 20\" x 4, no foam EC 20\" x 4        NBOS EC  Foam 1 min Foam 1 min Foam 1 min Foam 1 min                     Eventual Hip abd/ext   RTB 2 x 10 RTB 2 x 10 RTB 2 x 10        Tband Side Step   Nv? RTB  4 laps RTB 4 laps                     Ther Ex             Knee Flexion, Pt OP 4 x 10            Knee Flexion IR Pt OP 4 x 10 4 x 10 4 x 10 4 x 10 4 x 10         Progression?                                       Squat    2 x 10 Nv?        HR/TR  SL 2 x 10 SL 3 x 10 SL 3 x 10 SL 3 x 10        4-6\" Step trial  Eccentrics primarily.   4\" x 20 4\" x 20 nv        Ther Activity             ROM 0-125 to 145 0-145* 0-145* 0-145 0-145        PT OP Ext No pain No pain           PT OP Flex No better pain Mild pain.            LP + HR   60# nv? nv Nv?        Elevation trial?  4\" x 10           Modalities                                            "

## 2025-02-17 ENCOUNTER — APPOINTMENT (OUTPATIENT)
Dept: PHYSICAL THERAPY | Facility: CLINIC | Age: 55
End: 2025-02-17
Payer: OTHER GOVERNMENT

## 2025-02-24 ENCOUNTER — APPOINTMENT (OUTPATIENT)
Dept: PHYSICAL THERAPY | Facility: CLINIC | Age: 55
End: 2025-02-24
Payer: OTHER GOVERNMENT

## 2025-02-26 ENCOUNTER — OFFICE VISIT (OUTPATIENT)
Dept: PHYSICAL THERAPY | Facility: CLINIC | Age: 55
End: 2025-02-26
Payer: OTHER GOVERNMENT

## 2025-02-26 DIAGNOSIS — G89.29 CHRONIC PAIN OF LEFT KNEE: Primary | ICD-10-CM

## 2025-02-26 DIAGNOSIS — M25.562 CHRONIC PAIN OF LEFT KNEE: Primary | ICD-10-CM

## 2025-02-26 DIAGNOSIS — M22.2X2 PATELLOFEMORAL SYNDROME OF LEFT KNEE: ICD-10-CM

## 2025-02-26 PROCEDURE — 97140 MANUAL THERAPY 1/> REGIONS: CPT | Performed by: PHYSICAL THERAPIST

## 2025-02-26 PROCEDURE — 97112 NEUROMUSCULAR REEDUCATION: CPT | Performed by: PHYSICAL THERAPIST

## 2025-02-26 NOTE — PROGRESS NOTES
"Daily Note     Today's date: 2025  Patient name: Laura Rhoades  : 1970  MRN: [id number]  Referring provider: Zaira Santana MD  Dx:   Encounter Diagnosis     ICD-10-CM    1. Chronic pain of left knee  M25.562     G89.29       2. Patellofemoral syndrome of left knee  M22.2X2                      Subjective: Pt presents today stating that her knee is doing okay, no significant progression in better or worse.  No using compression sleeve, still going to [employer] and she got a new puppy and has been active in walking and dog baxter.        Objective: See treatment diary below      Assessment: Long discussion about future presentation of her symptoms and whether there is to be improvement over time vs partial or full TKA. RE n.v.     Precautions: Dr. Adan [facility] Spinal Stimulator 2024.        Manuals 1/8 1/10 1/22 1/27 2/12 2/26       Desensitization trial? Nv? With towel? 8 min TAS 8 min TAS 8 min TAS 8 min TAS 8 min TAS       Eventual progression of resistance in OKC and CKC based intervention.                                        Neuro Re-Ed             Education and progression 10 min            SLS UE Support  20\" x 4 20\" x 4 Foam20\" x 4 Foam 20\" x 4, no foam EC 20\" x 4 FOAM 20\" x 4 no foam EC 20\" x 4       NBOS EC  Foam 1 min Foam 1 min Foam 1 min Foam 1 min Foam 1 min                    Eventual Hip abd/ext   RTB 2 x 10 RTB 2 x 10 RTB 2 x 10 RTB 2 x 10       Tband Side Step   Nv? RTB  4 laps RTB 4 laps RTB 4 laps                    Ther Ex             Knee Flexion, Pt OP 4 x 10            Knee Flexion IR Pt OP 4 x 10 4 x 10 4 x 10 4 x 10 4 x 10  4 x 10       Progression?                                       Squat    2 x 10 Nv? 2 x 10       HR/TR  SL 2 x 10 SL 3 x 10 SL 3 x 10 SL 3 x 10 SL 3 x 10       4-6\" Step trial  Eccentrics primarily.   4\" x 20 4\" x 20 nv        Ther Activity             ROM 0-125 to 145 0-145* 0-145* 0-145 0-145 0-145       PT OP Ext No pain No pain           PT " "OP Flex No better pain Mild pain.            LP + HR   60# nv? nv Nv?        Elevation trial?  4\" x 10           Modalities                                            "

## 2025-02-28 DIAGNOSIS — M22.2X2 PATELLOFEMORAL SYNDROME OF LEFT KNEE: ICD-10-CM

## 2025-02-28 DIAGNOSIS — M25.562 CHRONIC PAIN OF LEFT KNEE: ICD-10-CM

## 2025-02-28 DIAGNOSIS — G89.29 CHRONIC PAIN OF LEFT KNEE: ICD-10-CM

## 2025-02-28 RX ORDER — CELECOXIB 100 MG/1
100 CAPSULE ORAL 2 TIMES DAILY
Qty: 60 CAPSULE | Refills: 1 | Status: SHIPPED | OUTPATIENT
Start: 2025-02-28

## 2025-03-03 ENCOUNTER — EVALUATION (OUTPATIENT)
Dept: PHYSICAL THERAPY | Facility: CLINIC | Age: 55
End: 2025-03-03
Payer: OTHER GOVERNMENT

## 2025-03-03 DIAGNOSIS — M22.2X2 PATELLOFEMORAL SYNDROME OF LEFT KNEE: ICD-10-CM

## 2025-03-03 DIAGNOSIS — M25.562 CHRONIC PAIN OF LEFT KNEE: Primary | ICD-10-CM

## 2025-03-03 DIAGNOSIS — G89.29 CHRONIC PAIN OF LEFT KNEE: Primary | ICD-10-CM

## 2025-03-03 PROCEDURE — 97112 NEUROMUSCULAR REEDUCATION: CPT | Performed by: PHYSICAL THERAPIST

## 2025-03-03 PROCEDURE — 97110 THERAPEUTIC EXERCISES: CPT | Performed by: PHYSICAL THERAPIST

## 2025-03-03 NOTE — PROGRESS NOTES
PT Evaluation     Today's date: 3/3/2025  Patient name: Laura Rhoades  : 1970  MRN: [id number]  Referring provider: Zaira Santana MD  Dx:   Encounter Diagnosis     ICD-10-CM    1. Chronic pain of left knee  M25.562     G89.29       2. Patellofemoral syndrome of left knee  M22.2X2                          Assessment  Impairments: abnormal gait, abnormal or restricted ROM, activity intolerance, impaired physical strength, lacks appropriate home exercise program, pain with function, poor posture , poor body mechanics, activity limitations and endurance    Assessment details: Pt is continuing to progress well at this time.  They have demonstrated further improvement in pain levels, strength, range, flexibility as well as overall tolerance to activity.  They will benefit continued Skilled PT intervention in order to achieve remaining LTGs sought out for them as well as by them in order to perform all desired activities with minimal to nil symptom exacerbation.  Primary focus will be eccentric strengthening to improve CKC based intervention.  Thank you very much for this kind referral.        Understanding of Dx/Px/POC: good     Prognosis: good    Goals  STG 4 Weeks:  Decrease pain at worst to 7 -met  Improve range to 135 flex -met  Improve strength to sustain 5/5, able to elevate stairs with 2 UE support at end of day.   -met  Independent with HEP -met  LTG 8 Weeks:  Decrease pain at worst to 4 -partial  Improve range to 145 flex -met  Improve strength to sustain 5/5, able to elevate stairs with 1 UE support at end of day.  -partial  Able to perform all desired activities with minimal to nil symptom exacerbation      Plan  Patient would benefit from: skilled physical therapy  Planned modality interventions: thermotherapy: hydrocollator packs and cryotherapy    Planned therapy interventions: joint mobilization, manual therapy, neuromuscular re-education, abdominal trunk stabilization, activity modification,  balance, behavior modification, body mechanics training, flexibility, functional ROM exercises, gait training, graded activity, graded exercise, home exercise program, transfer training, therapeutic training, therapeutic exercise, therapeutic activities, stretching and strengthening    Frequency: 2x week  Duration in weeks: 10  Treatment plan discussed with: patient      Subjective Evaluation    History of Present Illness  Date of onset: 2025  Mechanism of injury: Pt presents today stating that she as a whole is continuing to make gains.  Improving ability to proceed with recreational exercise class 2-3x's a week.  She has been pretty active over the last few days with preparation for a move.  Pt reports that pain levels at worst is 7-8/10, this is at end of day and especially with stairs in her home.  States that she has been compliant with HEP, continuing to be active with walking her dog and goals are to be able to reduce pain, further improve her ability to perform stairs and get back to as close as she can prior to her initial surgical intervention for L knee ACL reconstruction.    Follows up with Dr. Santana on 25.    Quality of life: good    Patient Goals  Patient goals for therapy: increased strength, increased motion, improved balance, decreased pain and return to sport/leisure activities    Pain  Current pain ratin  At best pain ratin  At worst pain ratin  Quality: dull ache  Relieving factors: change in position, rest and medications  Aggravating factors: standing, walking, stair climbing and lifting  Progression: improved      Diagnostic Tests  X-ray: normal  Treatments  Previous treatment: physical therapy, medication and injection treatment      Objective     Active Range of Motion   Left Knee   Flexion: 145 (PT ) degrees   Extension: 0 (PT OP) degrees     Right Knee   Flexion: 150 degrees   Extension: 0 degrees     Additional Active Range of Motion Details  Forward head,  "rounded shoulders, Lordosis  Genu L > R, mild antalgia L > R.    B/L Sensation intact to L3,4,5,S1,S2  (medial malleolus).    DTR Patella Cassie NP   Postural correction  NP  LS ROM    Flex nil  Ext min  SB R nil  SB L nil  LE Strength  Hip   L Flex Ext Abd Add all 5  R Flex Ext Abd Add all 5  LE Screen b/l 5/5 knee pain with flex/ext L, Ankle/DF Gtoe 5/5.    Joint Mobility  Palpation  Sts    Squat  NB  Walking mild better  Elevations Fair (-) on eccentrics of L.  // Fair + eccentrics elevations.      LE Repeated Movements  Flex/Flex Unloaded Pt OP I NW improved flexion.  Trial of IR flexion with pt improved walking mildly improved ROM to 145/150 PT OP.  (Remains)           Precautions: Dr. Adan [facility] Spinal Stimulator November 2024.        Manuals 1/8 1/10 1/22 1/27 2/12 2/26 3/3      Desensitization trial? Nv? With towel? 8 min TAS 8 min TAS 8 min TAS 8 min TAS 8 min TAS TAS 8 min      Eventual progression of resistance in OKC and CKC based intervention.                                        Neuro Re-Ed             Education and progression 10 min            SLS UE Support  20\" x 4 20\" x 4 Foam20\" x 4 Foam 20\" x 4, no foam EC 20\" x 4 FOAM 20\" x 4 no foam EC 20\" x 4 Foam 20\" x 4 Foam EC 20\" x 4      NBOS EC  Foam 1 min Foam 1 min Foam 1 min Foam 1 min Foam 1 min Foam 1 min                   Eventual Hip abd/ext   RTB 2 x 10 RTB 2 x 10 RTB 2 x 10 RTB 2 x 10 GTB 2 x 10      Tband Side Step   Nv? RTB  4 laps RTB 4 laps RTB 4 laps GTB 4 laps                   Ther Ex             Knee Flexion, Pt OP 4 x 10            Knee Flexion IR Pt OP 4 x 10 4 x 10 4 x 10 4 x 10 4 x 10  4 x 10 4 x 10       Progression?                                       Squat    2 x 10 Nv? 2 x 10 2 x 10      HR/TR  SL 2 x 10 SL 3 x 10 SL 3 x 10 SL 3 x 10 SL 3 x 10 SL HR 3 x 10      4-6\" Step trial  Eccentrics primarily.   4\" x 20 4\" x 20 nv        Ther Activity             ROM 0-125 to 145 0-145* 0-145* 0-145 0-145 0-145 0-145      PT OP Ext No " "pain No pain           PT OP Flex No better pain Mild pain.            LP + HR   60# nv? nv Nv?        Elevation trial?  4\" x 10           Modalities                                         "

## 2025-03-05 ENCOUNTER — APPOINTMENT (OUTPATIENT)
Dept: PHYSICAL THERAPY | Facility: CLINIC | Age: 55
End: 2025-03-05
Payer: OTHER GOVERNMENT

## 2025-03-10 ENCOUNTER — OFFICE VISIT (OUTPATIENT)
Dept: PHYSICAL THERAPY | Facility: CLINIC | Age: 55
End: 2025-03-10
Payer: OTHER GOVERNMENT

## 2025-03-10 DIAGNOSIS — M22.2X2 PATELLOFEMORAL SYNDROME OF LEFT KNEE: ICD-10-CM

## 2025-03-10 DIAGNOSIS — G89.29 CHRONIC PAIN OF LEFT KNEE: Primary | ICD-10-CM

## 2025-03-10 DIAGNOSIS — M25.562 CHRONIC PAIN OF LEFT KNEE: Primary | ICD-10-CM

## 2025-03-10 PROCEDURE — 97112 NEUROMUSCULAR REEDUCATION: CPT | Performed by: PHYSICAL THERAPIST

## 2025-03-10 PROCEDURE — 97110 THERAPEUTIC EXERCISES: CPT | Performed by: PHYSICAL THERAPIST

## 2025-03-10 NOTE — PROGRESS NOTES
"Daily Note     Today's date: 3/10/2025  Patient name: Laura Rhoades  : 1970  MRN: [id number]  Referring provider: Zaira Santana MD  Dx:   Encounter Diagnosis     ICD-10-CM    1. Chronic pain of left knee  M25.562     G89.29       2. Patellofemoral syndrome of left knee  M22.2X2                      Subjective: Pt presents today stating she got done packing is sore and tired from it.        Objective: See treatment diary below      Assessment: Pt was not progressed secondary to being in a fatigued state upon arrival.  Continue to progress as able moving forward.        Precautions: Dr. Adan [facility] Spinal Stimulator 2024.        Manuals 1/8 1/10 1/22 1/27 2/12 2/26 3/3 3/10     Desensitization trial? Nv? With towel? 8 min TAS 8 min TAS 8 min TAS 8 min TAS 8 min TAS TAS 8 min TAS 8 min     Eventual progression of resistance in OKC and CKC based intervention.                                        Neuro Re-Ed             Education and progression 10 min            SLS UE Support  20\" x 4 20\" x 4 Foam20\" x 4 Foam 20\" x 4, no foam EC 20\" x 4 FOAM 20\" x 4 no foam EC 20\" x 4 Foam 20\" x 4 Foam EC 20\" x 4 Foam 20\" x 4, Foam EC 20\"x 4     NBOS EC  Foam 1 min Foam 1 min Foam 1 min Foam 1 min Foam 1 min Foam 1 min Foam 1 min                  Eventual Hip abd/ext   RTB 2 x 10 RTB 2 x 10 RTB 2 x 10 RTB 2 x 10 GTB 2 x 10 GTB 3 x 10     Tband Side Step   Nv? RTB  4 laps RTB 4 laps RTB 4 laps GTB 4 laps GTB 4 laps                  Ther Ex             Knee Flexion, Pt OP 4 x 10            Knee Flexion IR Pt OP 4 x 10 4 x 10 4 x 10 4 x 10 4 x 10  4 x 10 4 x 10  4 x 10     Progression?                                       Squat    2 x 10 Nv? 2 x 10 2 x 10 2 x 10     HR/TR  SL 2 x 10 SL 3 x 10 SL 3 x 10 SL 3 x 10 SL 3 x 10 SL HR 3 x 10 SL 3 x 10     4-6\" Step trial  Eccentrics primarily.   4\" x 20 4\" x 20 nv        Ther Activity             ROM 0-125 to 145 0-145* 0-145* 0-145 0-145 0-145 0-145 0-145     PT OP Ext " "No pain No pain           PT OP Flex No better pain Mild pain.            LP + HR   60# nv? nv Nv?        Elevation trial?  4\" x 10           Modalities                                         "

## 2025-03-11 ENCOUNTER — TELEPHONE (OUTPATIENT)
Age: 55
End: 2025-03-11

## 2025-03-11 NOTE — TELEPHONE ENCOUNTER
Caller: Patient     Doctor: Dr. Santana     Reason for call: Asked for status of being added on     Call back#: [phone]

## 2025-03-11 NOTE — TELEPHONE ENCOUNTER
Caller: Patient     Doctor: Dr. Santana     Reason for call: Patient asked to be added on to schedule for Gel injection     Call back#: [phone]

## 2025-03-12 ENCOUNTER — APPOINTMENT (OUTPATIENT)
Dept: PHYSICAL THERAPY | Facility: CLINIC | Age: 55
End: 2025-03-12
Payer: OTHER GOVERNMENT

## 2025-03-12 NOTE — TELEPHONE ENCOUNTER
Caller: Laura    Doctor: Dr. Santana    Reason for call: Patient is checking in on other two messages she left to see if she can be added on the schedule somewhere with [facility] for her Gel injections? She is in a lot of pain. She said the medication is already at [facility], looks to be for Durolane. Patient in a decent amount of pain.     Call back#: [phone]

## 2025-03-17 ENCOUNTER — APPOINTMENT (OUTPATIENT)
Dept: PHYSICAL THERAPY | Facility: CLINIC | Age: 55
End: 2025-03-17
Payer: OTHER GOVERNMENT

## 2025-03-18 ENCOUNTER — PROCEDURE VISIT (OUTPATIENT)
Dept: OBGYN CLINIC | Facility: CLINIC | Age: 55
End: 2025-03-18
Payer: OTHER GOVERNMENT

## 2025-03-18 VITALS — HEIGHT: 64 IN | WEIGHT: 116 LBS | BODY MASS INDEX: 19.81 KG/M2

## 2025-03-18 DIAGNOSIS — M17.12 PRIMARY OSTEOARTHRITIS OF LEFT KNEE: Primary | ICD-10-CM

## 2025-03-18 PROCEDURE — 20610 DRAIN/INJ JOINT/BURSA W/O US: CPT | Performed by: PHYSICIAN ASSISTANT

## 2025-03-18 NOTE — PROGRESS NOTES
"Name: Laura Rhoades      : 1970      MRN: [id number]  Encounter Provider: Ry Bruce PA-C  Encounter Date: 3/18/2025   Encounter department: [facility]  :  Assessment & Plan  Primary osteoarthritis of left knee  Weightbearing as tolerated left lower extremity  Left knee intra-articular Durolane injection administered as noted above  Advised no significant activity or increased ambulation over the next 24-48 hours and warm compresses to the knee no greater 20 minutes 3-4 times 24 hours following the injection.  Patient advised should they develop any increasing pain, redness, drainage, numbness, tingling or swelling surrounding the injection sight, they are to contact our office or present to the emergency department.  Continue physical therapy range of motion stretching strengthening exercise specifically VMO strengthening exercises           History of Present Illness   HPI  Laura Rhoades is a 54 y.o. female who presents today regarding left knee pain.  Patient states the pain is localized her left knee worse with activity worse weightbearing mildly relieved with rest.  She states having history of viscosupplementation injections the past with only mild pain relief.  She rates the pain anywhere from a 2 to a 7 out of 10.  She is significant limited in her left knee at this time.     Objective   Ht 5' 4\" (1.626 m)   Wt 52.6 kg (116 lb)   BMI 19.91 kg/m²                    Review Of Systems:   Skin: Normal  Neuro: See HPI  Musculoskeletal: See HPI  All other systems reviewed and are negative    Past Medical History:   Past Medical History:   Diagnosis Date    Anxiety        Past Surgical History:   Past Surgical History:   Procedure Laterality Date    APPENDECTOMY      HYSTERECTOMY      KNEE SURGERY Left     x4    SPINAL CORD STIMULATOR IMPLANT         Family History:  Family history reviewed and non-contributory  History reviewed. No pertinent family " history.      Social History:  Social History     Socioeconomic History    Marital status: /Civil Union     Spouse name: None    Number of children: None    Years of education: None    Highest education level: None   Occupational History    None   Tobacco Use    Smoking status: Never    Smokeless tobacco: Never   Vaping Use    Vaping status: Never Used   Substance and Sexual Activity    Alcohol use: Yes     Alcohol/week: 4.0 standard drinks of alcohol     Types: 4 Glasses of wine per week    Drug use: Never    Sexual activity: Yes     Partners: Female     Birth control/protection: Post-menopausal, None   Other Topics Concern    None   Social History Narrative    None     Social Drivers of Health     Financial Resource Strain: Not on file   Food Insecurity: Not on file   Transportation Needs: Not on file   Physical Activity: Not on file   Stress: Not on file   Social Connections: Not on file   Intimate Partner Violence: Not on file   Housing Stability: Not on file       Allergies:   Allergies   Allergen Reactions    Bupropion Rash       Labs:  0   Lab Value Date/Time    PT 13.1 11/07/2024 1041    INR 1.0 11/07/2024 1041    CRP <3.0 08/06/2019 1043       Meds:    Current Outpatient Medications:     ALPRAZolam (XANAX) 0.5 mg tablet, , Disp: , Rfl:     celecoxib (CeleBREX) 100 mg capsule, TAKE 1 CAPSULE BY MOUTH TWICE A DAY, Disp: 60 capsule, Rfl: 1    diclofenac (VOLTAREN) 75 mg EC tablet, Take 75 mg by mouth, Disp: , Rfl:     DULoxetine (CYMBALTA) 20 mg capsule, Take 20 mg by mouth daily, Disp: , Rfl:     estradiol (ESTRACE) 1 mg tablet, , Disp: , Rfl:     gabapentin (NEURONTIN) 100 mg capsule, , Disp: , Rfl:     phentermine (ADIPEX-P) 37.5 MG tablet, Take 37.5 mg by mouth daily before breakfast, Disp: , Rfl:     traZODone (DESYREL) 100 mg tablet, , Disp: , Rfl:     Body mass index is 19.91 kg/m².  Wt Readings from Last 3 Encounters:   03/18/25 52.6 kg (116 lb)   01/28/25 54 kg (119 lb)   01/03/25 54 kg (119  lb)     Physical Exam:   There were no vitals filed for this visit.    General Appearance:    Alert, cooperative, no distress, appears stated age   Head:    Normocephalic, without obvious abnormality, atraumatic   Eyes:    conjunctiva/corneas clear, both eyes        Nose:   Nares normal, septum midline, no drainage    Throat:   Lips normal; teeth and gums normal   Neck:    symmetrical, trachea midline, ;     thyroid:  no enlargement/   Back:     Symmetric, no curvature, ROM normal   Lungs:   No audible wheezing or labored breathing   Chest Wall:    No tenderness or deformity    Heart:    Regular rate and rhythm               Pulses:   2+ and symmetric all extremities   Skin:   Skin color, texture, turgor normal, no rashes or lesions   Neurologic:   normal strength, sensation and reflexes     throughout       Musculoskeletal: left lower extremity  On examination of the left knee there is no effusion, no erythema well-healed anterior scars noted.  Range of motion to full active extension and flexion to greater than 120°.  Pain on palpation medial and lateral joint lines.  There is crepitus with range of motion, no warmth to palpation, bony enlargement noted. No pain on palpation pes anserine bursa region or distal iliotibial band.  Stable to varus and valgus stress without pain or gapping.  Negative anterior and posterior drawer testing.  Sensation intact distal pulses present.        _*_*_*_*_*_*_*_*_*_*_*_*_*_*_*_*_*_*_*_*_*_*_*_*_*_*_*_*_*_*_*_*_*_*_*_*_*_*_*_*_*    Assessment:  54 y.o.female with left knee pain due to arthritis    Large joint arthrocentesis: L knee  Universal Protocol:  Consent: Verbal consent obtained.  Consent given by: patient  Patient understanding: patient states understanding of the procedure being performed  Site marked: the operative site was marked  Patient identity confirmed: verbally with patient  Supporting Documentation  Indications: pain   Procedure Details  Location: knee - L  knee  Needle size: 22 G  Approach: superior  Medications administered: 3 mL sodium hyaluronate 60 MG/3ML    Patient tolerance: patient tolerated the procedure well with no immediate complications                         .

## 2025-03-19 ENCOUNTER — APPOINTMENT (OUTPATIENT)
Dept: PHYSICAL THERAPY | Facility: CLINIC | Age: 55
End: 2025-03-19
Payer: OTHER GOVERNMENT

## 2025-04-25 ENCOUNTER — OFFICE VISIT (OUTPATIENT)
Dept: OBGYN CLINIC | Facility: CLINIC | Age: 55
End: 2025-04-25
Payer: OTHER GOVERNMENT

## 2025-04-25 ENCOUNTER — PREP FOR PROCEDURE (OUTPATIENT)
Dept: OBGYN CLINIC | Facility: CLINIC | Age: 55
End: 2025-04-25

## 2025-04-25 ENCOUNTER — APPOINTMENT (OUTPATIENT)
Dept: RADIOLOGY | Facility: AMBULARY SURGERY CENTER | Age: 55
End: 2025-04-25
Attending: ORTHOPAEDIC SURGERY
Payer: OTHER GOVERNMENT

## 2025-04-25 VITALS — WEIGHT: 116 LBS | BODY MASS INDEX: 19.81 KG/M2 | HEIGHT: 64 IN

## 2025-04-25 DIAGNOSIS — M17.12 PRIMARY OSTEOARTHRITIS OF LEFT KNEE: Primary | ICD-10-CM

## 2025-04-25 DIAGNOSIS — M17.12 PRIMARY OSTEOARTHRITIS OF LEFT KNEE: ICD-10-CM

## 2025-04-25 PROCEDURE — 99215 OFFICE O/P EST HI 40 MIN: CPT | Performed by: ORTHOPAEDIC SURGERY

## 2025-04-25 PROCEDURE — 73562 X-RAY EXAM OF KNEE 3: CPT

## 2025-04-25 RX ORDER — FOLIC ACID 1 MG/1
1 TABLET ORAL DAILY
Qty: 30 TABLET | Refills: 1 | Status: SHIPPED | OUTPATIENT
Start: 2025-04-25

## 2025-04-25 RX ORDER — CHLORHEXIDINE GLUCONATE 40 MG/ML
SOLUTION TOPICAL DAILY PRN
OUTPATIENT
Start: 2025-04-25

## 2025-04-25 RX ORDER — SODIUM CHLORIDE, SODIUM LACTATE, POTASSIUM CHLORIDE, CALCIUM CHLORIDE 600; 310; 30; 20 MG/100ML; MG/100ML; MG/100ML; MG/100ML
20 INJECTION, SOLUTION INTRAVENOUS CONTINUOUS
OUTPATIENT
Start: 2025-04-25

## 2025-04-25 RX ORDER — SODIUM CHLORIDE, SODIUM LACTATE, POTASSIUM CHLORIDE, CALCIUM CHLORIDE 600; 310; 30; 20 MG/100ML; MG/100ML; MG/100ML; MG/100ML
125 INJECTION, SOLUTION INTRAVENOUS CONTINUOUS
OUTPATIENT
Start: 2025-04-25

## 2025-04-25 RX ORDER — ACETAMINOPHEN 325 MG/1
975 TABLET ORAL ONCE
OUTPATIENT
Start: 2025-04-25 | End: 2025-04-25

## 2025-04-25 RX ORDER — ASCORBIC ACID 500 MG
500 TABLET ORAL 2 TIMES DAILY
Qty: 60 TABLET | Refills: 1 | Status: SHIPPED | OUTPATIENT
Start: 2025-04-25

## 2025-04-25 RX ORDER — FERROUS SULFATE 324(65)MG
324 TABLET, DELAYED RELEASE (ENTERIC COATED) ORAL
Qty: 60 TABLET | Refills: 1 | Status: SHIPPED | OUTPATIENT
Start: 2025-04-25

## 2025-04-25 RX ORDER — CHLORHEXIDINE GLUCONATE ORAL RINSE 1.2 MG/ML
15 SOLUTION DENTAL ONCE
OUTPATIENT
Start: 2025-04-25 | End: 2025-04-25

## 2025-04-25 NOTE — PROGRESS NOTES
Name: Laura Rhoades      : 1970      MRN: [id number]  Encounter Provider: Zaira Santana MD  Encounter Date: 2025   Encounter department: [facility] [place]  :  Assessment & Plan  Primary osteoarthritis of left knee    Orders:    XR knee 3 vw left non injury; Future    MRI knee left  wo contrast; Future    Case request operating room: ARTHROPLASTY KNEE TOTAL W ROBOT; Standing    ascorbic acid (VITAMIN C) 500 MG tablet; Take 1 tablet (500 mg total) by mouth 2 (two) times a day    ferrous sulfate 324 (65 Fe) mg; Take 1 tablet (324 mg total) by mouth daily before breakfast    folic acid (FOLVITE) 1 mg tablet; Take 1 tablet (1 mg total) by mouth daily    Comprehensive metabolic panel; Future    Hemoglobin A1C W/EAG Estimation; Future    CBC and differential; Future    MRSA culture; Future    Protime-INR; Future    APTT; Future    Type and screen; Future    Ambulatory Referral to [facility]; Future    Ambulatory referral to Physical Therapy; Future    Ambulatory referral to surgical optimization; Future    EKG 12 lead; Future  X-ray of the left knee was obtained and reviewed in the office today which showed tricompartmental joint space narrowing worse in the patellofemoral joint with osteophyte formation  Weightbearing as tolerated  Will be ordering MRI of the left knee evaluate for arthritic changes and soft tissue pathology  Discussed with patient surgery for a left total knee arthroplasty with robotic assistance.  patient agrees and would like to proceed for scheduling for surgery  The benefits and purpose of the operations and/or procedure have been explained to me in language I understand by Dr. Santana well as the risks and benefits, alternatives and complications pertaining to the above procedure/surgery which include but is not limited to: infections, deeps vein thrombosis, blood clots to the lungs, wound healing problems, complications from  extensive blood loss, including shock, possible death, continued pain, fracture, vascular or nerve injury, potential need for further surgery/revision, persistent pain/stiffness/instability, failure of hardware,heart attack, stroke and not obtaining results patient used.    SAME DAY SURGERY   Patient will need medical clearance from her PCP prior to surgery  Patient has nohistory of DVT/PE and will be on Aspirin 81 mg twice daily for DVT prophylaxis postop when discharged from the hospital for 5 weeks   She will be on vitamins 30 days prior to surgery   Patient lives at home and will have her  taking care of her.  Patient will be assessed by physical therapy prior to discharge and will continue outpatient physical therapy  Patient's extremity will be wrapped in an Ace wrap/sleeve from the toes up to the knee down with postoperative swelling.  Patient will then be assessed 2-3 weeks postoperatively with incision check, new x-rays by either Ry Bruce PA-C or Dr. Santana.  At that time it is reasonable for the patient to be on an ambulatory device such as a walker or cane, but at the 3-month corrie these ambulatory devices should be discontinued.       Follow up visit :  PO 3 weeks Left TKA         The above stated was discussed in layman's terms and the patient expressed understanding.  All questions were answered to the patient's satisfaction.       Subjective:   Laura Rhoades is a 55 y.o. female who presents presents today for follow-up for left knee pain due to osteoarthritis.  Patient received a left knee Durolane injection on 3/18/25 and states she had no relief from the injection.  She states she has constant pain in the left knee.  She notes her pain is anterior and lateral aspect of the left knee.  She states every day she is in pain and the pain has been limiting her from her daily activities.  Patient has been doing VMO strengthening exercises.  Patient states she has good and bad days but in her  bad days she is unable to walk.  Patient does have history of having a steroid injection with no relief.  Patient does have history of having ACL reconstruction in 2019 in [place].      Review of systems negative unless otherwise specified in HPI    Past Medical History:   Diagnosis Date    Anxiety        Past Surgical History:   Procedure Laterality Date    APPENDECTOMY      HYSTERECTOMY      KNEE SURGERY Left     x4    SPINAL CORD STIMULATOR IMPLANT         History reviewed. No pertinent family history.    Social History     Occupational History    Not on file   Tobacco Use    Smoking status: Never    Smokeless tobacco: Never   Vaping Use    Vaping status: Never Used   Substance and Sexual Activity    Alcohol use: Yes     Alcohol/week: 4.0 standard drinks of alcohol     Types: 4 Glasses of wine per week    Drug use: Never    Sexual activity: Yes     Partners: Female     Birth control/protection: Post-menopausal, None         Current Outpatient Medications:     ALPRAZolam (XANAX) 0.5 mg tablet, , Disp: , Rfl:     celecoxib (CeleBREX) 100 mg capsule, TAKE 1 CAPSULE BY MOUTH TWICE A DAY, Disp: 60 capsule, Rfl: 1    diclofenac (VOLTAREN) 75 mg EC tablet, Take 75 mg by mouth, Disp: , Rfl:     DULoxetine (CYMBALTA) 20 mg capsule, Take 20 mg by mouth daily, Disp: , Rfl:     estradiol (ESTRACE) 1 mg tablet, , Disp: , Rfl:     gabapentin (NEURONTIN) 100 mg capsule, , Disp: , Rfl:     phentermine (ADIPEX-P) 37.5 MG tablet, Take 37.5 mg by mouth daily before breakfast, Disp: , Rfl:     traZODone (DESYREL) 100 mg tablet, , Disp: , Rfl:     Allergies   Allergen Reactions    Bupropion Rash          There were no vitals filed for this visit.  Body mass index is 19.91 kg/m².  Wt Readings from Last 3 Encounters:   04/25/25 52.6 kg (116 lb)   03/18/25 52.6 kg (116 lb)   01/28/25 54 kg (119 lb)       Objective:            Physical Exam  Physical Exam:      General Appearance:    Alert, cooperative, no distress, appears stated age  "  Head:    Normocephalic, without obvious abnormality, atraumatic   Eyes:    conjunctiva/corneas clear, both eyes         Nose:   Nares normal, septum midline, no drainage    Throat:   Lips normal; teeth and gums normal   Neck:    symmetrical, trachea midline, ;     thyroid:  no enlargement/   Back:     Symmetric, no curvature, ROM normal   Lungs:   No audible wheezing or labored breathing   Chest Wall:    No tenderness or deformity    Heart:    Regular rate and rhythm                         Pulses:   2+ and symmetric all extremities   Skin:   Skin color, texture, turgor normal, no rashes or lesions   Neurologic:   normal strength, sensation and reflexes     throughout                       Ortho Exam  Left  Knee  Surgical scars well healed   No erythema or open wounds  Mild effusion  Tenderness palpation of medial joint line  No lateral joint line tenderness  Extension : lacks 10 degrees of full extension   Full flexion  Patellar grind test -  Stable to varus and valgus stress  Negative posterior drawer  Negative Lachman test  Neurovascular intact distally       Diagnostics, reviewed and taken today if performed as documented:    The attending physician has personally reviewed the pertinent films in PACS and interpretation is as follows: X-ray left knee demonstrates tricompartment joint space narrowing worse in the patellofemoral joint with osteophyte formation      Procedures, if performed today:    Procedures    None performed      Scribe Attestation      I,:  Frida Mccormack MA am acting as a scribe while in the presence of the attending physician.:       I,:  Zaira Santana MD personally performed the services described in this documentation    as scribed in my presence.:               Portions of the record may have been created with voice recognition software.  Occasional wrong word or \"sound a like\" substitutions may have occurred due to the inherent limitations of voice recognition software.  " Read the chart carefully and recognize, using context, where substitutions have occurred.

## 2025-04-25 NOTE — PATIENT INSTRUCTIONS
"  STRENGTHENING:   Quadriceps:   Isometric Quad sets                        Progression for Quadriceps/VMO:  Hold at 45 degree angle (Picture #1)    Key: Slow & Intentional  Two ways to perform:  Start with 10 reps on each side maintaining neutral pelvis. *   Hold position for a 3-5 count  When form and exercise because less challenging; increase the REPITITIONS or DURATION your holding.    Perform Wall Squats below: 3 Sets of 10 Reps         Hold for 3-5 count          Progression for Quadriceps/VMO:  Use a ball hold between knees to activate VMO  Key: Slow & Intentional  Two ways to perform:  Start with 10 reps on each side maintaining neutral pelvis. *   Hold position for a 3-5 count  When form and exercise because less challenging; increase the REPITITIONS or DURATION your holding.  Perform the Wall Squat w/ball: 3 sets 10 reps        Hold for 3-5 count                        Progression:   Adding a Theraband for resistance while maintaining good form during a wall squat is more challenging.   Perform Wall squat w/Theraband:   3 sets 10 reps  Hold for a 3-5 count          Mindful preparation to begin daily prior surgery  1. I know and accept that this procedure will bring greater quality of life to me.  2. I must remember the incredible power I possess within me to achieve anything I desire, including being \"pain free\".  3. I have found out what is keeping me from happiness and will remove it. That is why I am here today.  4. I will always come out of a hard situation stronger.   5. I can't wait to embrace the new me when I wake up later.  6. I am determined to remain calm through this  7. Anxiety has no home in my being.  8. I rise above my physical pain.  9. There are some things I can't change, and I'm ok with that. But this procedure isn't one of those things because my team and I have got this in the bag.  10. I am feeling strong and healthy today.  11. Even in this bed, I am making better decisions about " what I eat and drink.  12. Because I am here, I am choosing to make my mental and physical health a priority.  13. I picture my body glowing, growing, and healing.  14. I am well, fit, healthy, and powerful.

## 2025-04-28 DIAGNOSIS — M17.12 PRIMARY OSTEOARTHRITIS OF LEFT KNEE: Primary | ICD-10-CM

## 2025-04-28 RX ORDER — MUPIROCIN 20 MG/G
OINTMENT TOPICAL
Qty: 15 G | Refills: 0 | Status: SHIPPED | OUTPATIENT
Start: 2025-04-28

## 2025-04-30 ENCOUNTER — TELEPHONE (OUTPATIENT)
Age: 55
End: 2025-04-30

## 2025-04-30 DIAGNOSIS — M22.2X2 PATELLOFEMORAL SYNDROME OF LEFT KNEE: ICD-10-CM

## 2025-04-30 DIAGNOSIS — G89.29 CHRONIC PAIN OF LEFT KNEE: ICD-10-CM

## 2025-04-30 DIAGNOSIS — M25.562 CHRONIC PAIN OF LEFT KNEE: ICD-10-CM

## 2025-04-30 RX ORDER — CELECOXIB 100 MG/1
100 CAPSULE ORAL 2 TIMES DAILY
Qty: 60 CAPSULE | Refills: 1 | Status: SHIPPED | OUTPATIENT
Start: 2025-04-30

## 2025-04-30 NOTE — TELEPHONE ENCOUNTER
Called and spoke to patient, reviewed preop vitamins should be started 30 days prior to surgery. Verbalizes understanding, no additional questions.

## 2025-04-30 NOTE — TELEPHONE ENCOUNTER
Caller: Patient    Call back#: [phone]     Best call back time am/pm/all day: all day    Doctor: Dr Santana    Surgery: no    Date of Surgery: 7/3/25    Reason for call: Patient would like to know when she should start taking the folic acid and vitamin C in preparation for ARTHROPLASTY KNEE TOTAL W ROBOT (Left: Knee) on 7/3. Please advise.

## 2025-05-06 ENCOUNTER — TELEPHONE (OUTPATIENT)
Age: 55
End: 2025-05-06

## 2025-05-06 DIAGNOSIS — G89.29 CHRONIC PAIN OF LEFT KNEE: Primary | ICD-10-CM

## 2025-05-06 DIAGNOSIS — M25.562 CHRONIC PAIN OF LEFT KNEE: Primary | ICD-10-CM

## 2025-05-06 RX ORDER — MELOXICAM 15 MG/1
15 TABLET ORAL DAILY
Qty: 30 TABLET | Refills: 0 | Status: SHIPPED | OUTPATIENT
Start: 2025-05-06 | End: 2025-06-05

## 2025-05-06 NOTE — TELEPHONE ENCOUNTER
Caller: Patient    Doctor: Dr. Santana    Reason for call: Patient calling stating that she is scheduled for Left TKA on 7/3/25 and she has currently been Celebrex prescribed by Dr. Santana.  Patient is stating that the Celebrex is not helping lately and she is wondering if there was a higher dose she can take or something else that can be prescribed to get her to her surgery date?  She can be reached at number below.      Call back#: [phone]

## 2025-05-06 NOTE — TELEPHONE ENCOUNTER
Called and spoke w/pt and she is interested and would be willing try the Meloxicam. Advise to take w/food. Do not take w/other NSAIDs. Pt states she understands.    Pharmacy: [facility].

## 2025-05-14 NOTE — QUICK NOTE
Investigation Report    Device investigated: Medtronic Stimulator            Method investigated (surgical report, imaging report, vendor contacted, website used etc): Tech Support, Epic, [employer]    Time spent investigating in minutes: 15 minutes    Investigation findings: MR Conditional    Risk vs Benefit performed.  If so, list physician and outcome: n/a

## 2025-05-22 ENCOUNTER — HOSPITAL ENCOUNTER (OUTPATIENT)
Dept: RADIOLOGY | Facility: HOSPITAL | Age: 55
Discharge: HOME | End: 2025-05-22
Attending: ORTHOPAEDIC SURGERY
Payer: OTHER GOVERNMENT

## 2025-05-22 ENCOUNTER — HOSPITAL ENCOUNTER (OUTPATIENT)
Dept: RADIOLOGY | Facility: HOSPITAL | Age: 55
Discharge: HOME | End: 2025-05-22
Payer: OTHER GOVERNMENT

## 2025-05-22 DIAGNOSIS — Z96.89 S/P INSERTION OF SPINAL CORD STIMULATOR: ICD-10-CM

## 2025-05-22 DIAGNOSIS — M17.12 PRIMARY OSTEOARTHRITIS OF LEFT KNEE: ICD-10-CM

## 2025-05-22 PROCEDURE — 73721 MRI JNT OF LWR EXTRE W/O DYE: CPT

## 2025-05-22 PROCEDURE — 76014 MR SFTY IMPLT&/FB ASMT STF 1: CPT

## 2025-05-22 RX ORDER — FOLIC ACID 1 MG/1
1000 TABLET ORAL DAILY
Qty: 90 TABLET | Refills: 1 | OUTPATIENT
Start: 2025-05-22

## 2025-05-22 RX ORDER — ASCORBIC ACID 500 MG
500 TABLET ORAL 2 TIMES DAILY
Qty: 180 TABLET | Refills: 1 | OUTPATIENT
Start: 2025-05-22

## 2025-05-29 ENCOUNTER — TELEPHONE (OUTPATIENT)
Dept: OBGYN CLINIC | Facility: HOSPITAL | Age: 55
End: 2025-05-29

## 2025-05-29 NOTE — TELEPHONE ENCOUNTER
Preoperative Elective Admission Assessment: spoke to patient.    EKG/LAB/MRSA SWAB/CXR date: 6/6    *6/17  Bernice     Living Situation:    Who does pt live with: spouse  What kind of home: multi-level  How do they enter the home: Garage  How many levels in home: 2 story home.    # of steps to enter home: 2 to enter   # of steps to second floor: 15 to 2nd floor.   Are there handrails: Yes  Are there landings: Yes  Sleeping arrangement: first/entry floor  Where is Bathroom: 1/2 bath on entry   Where is the tub or shower: Second floor bed and bath, walk in shower.   Toilet height? Concerns for low toilets : Second floor is higher. First floor is average.      First Floor Setup:   Is there a bathroom: Yes  Where would pt sleep: couch     DME: cane -- ordering RW and RTS. Placed order today. PROVIDED [facility] NUMBER TO CONTACT: [phone]     We discussed clearing pathways in the home and making sure there is accessibly to use the walker, for example, removing throw rugs.      Patient's Current Level of Function: Ambulates: Independently and ADLs: Independent    Post-op Caregiver: spouse (TAKING 1 WEEK OFF, THEN HAS MIL IF NEEDED).   Currently receive any HHC/aides/community supports: No     Post-op Transport: spouse  To/from hospital: spouse  To/from PT 2-3x/week:  Spouse or MIL.   Uses community transport now: No     Outpatient Physical Therapy Site:  Site: [facility]- needs changed from [facility].    pre and post-op appts scheduled? NO - sent to [facility]      Medication Management: self  Preferred Pharmacy for Post-op Medications: [id number] - [place] - [address] [[id number]]   Blood Management Vitamin Regimen: Pt has at home to start 30 days before.   Post-op anticoagulant: to be determined by surgical team postoperatively  Has Bactroban for 5 days preop: Yes  Educated on Preoperative Bathing Instructions, and use of Soap for 5 days before surgery.      DC Plan: Pt plans to be discharged  home    Barriers to DC identified preoperatively: none identified    BMI: 19.91    Patient Education:  Pt educated on post-op pain, early mobilization (POD0), LOS goals, OP PT goals, and preoperative bathing. Patient educated that our goal is to appropriately discharge patient based off their post-op function while striving to maintain maximal independence. The goal is to discharge patient to home and for them to attend outpatient physical therapy.    Assigned to care team? Yes

## 2025-05-30 NOTE — TELEPHONE ENCOUNTER
Spoke to patient, educated on PA-C message. She has no further questions at this time.   pt encouraged to call me with questions, concerns or issues.

## 2025-06-03 DIAGNOSIS — G89.29 CHRONIC PAIN OF LEFT KNEE: ICD-10-CM

## 2025-06-03 DIAGNOSIS — M25.562 CHRONIC PAIN OF LEFT KNEE: ICD-10-CM

## 2025-06-03 LAB
DME PARACHUTE DELIVERY DATE ACTUAL: NORMAL
DME PARACHUTE DELIVERY DATE EXPECTED: NORMAL
DME PARACHUTE DELIVERY DATE REQUESTED: NORMAL
DME PARACHUTE ITEM DESCRIPTION: NORMAL
DME PARACHUTE ITEM DESCRIPTION: NORMAL
DME PARACHUTE ORDER STATUS: NORMAL
DME PARACHUTE SUPPLIER NAME: NORMAL
DME PARACHUTE SUPPLIER PHONE: NORMAL

## 2025-06-04 RX ORDER — MELOXICAM 15 MG/1
15 TABLET ORAL DAILY
Qty: 30 TABLET | Refills: 0 | Status: SHIPPED | OUTPATIENT
Start: 2025-06-04

## 2025-06-06 ENCOUNTER — LAB REQUISITION (OUTPATIENT)
Dept: LAB | Facility: HOSPITAL | Age: 55
End: 2025-06-06

## 2025-06-06 ENCOUNTER — LAB (OUTPATIENT)
Dept: LAB | Facility: AMBULARY SURGERY CENTER | Age: 55
End: 2025-06-06
Payer: OTHER GOVERNMENT

## 2025-06-06 ENCOUNTER — APPOINTMENT (OUTPATIENT)
Dept: LAB | Facility: AMBULARY SURGERY CENTER | Age: 55
End: 2025-06-06

## 2025-06-06 ENCOUNTER — APPOINTMENT (OUTPATIENT)
Dept: LAB | Facility: AMBULARY SURGERY CENTER | Age: 55
End: 2025-06-06
Attending: ORTHOPAEDIC SURGERY

## 2025-06-06 DIAGNOSIS — M17.12 UNILATERAL PRIMARY OSTEOARTHRITIS, LEFT KNEE: ICD-10-CM

## 2025-06-06 DIAGNOSIS — M17.12 PRIMARY OSTEOARTHRITIS OF LEFT KNEE: ICD-10-CM

## 2025-06-06 DIAGNOSIS — M19.90 ARTHRITIS: ICD-10-CM

## 2025-06-06 LAB
ALBUMIN SERPL BCG-MCNC: 4.2 G/DL (ref 3.5–5)
ALP SERPL-CCNC: 48 U/L (ref 34–104)
ALT SERPL W P-5'-P-CCNC: 20 U/L (ref 7–52)
ANION GAP SERPL CALCULATED.3IONS-SCNC: 9 MMOL/L (ref 4–13)
APTT PPP: 27 SECONDS (ref 23–34)
AST SERPL W P-5'-P-CCNC: 21 U/L (ref 13–39)
ATRIAL RATE: 72 BPM
BASOPHILS # BLD AUTO: 0.06 THOUSANDS/ÂΜL (ref 0–0.1)
BASOPHILS NFR BLD AUTO: 1 % (ref 0–1)
BILIRUB SERPL-MCNC: 0.55 MG/DL (ref 0.2–1)
BUN SERPL-MCNC: 16 MG/DL (ref 5–25)
CALCIUM SERPL-MCNC: 9.2 MG/DL (ref 8.4–10.2)
CHLORIDE SERPL-SCNC: 101 MMOL/L (ref 96–108)
CO2 SERPL-SCNC: 28 MMOL/L (ref 21–32)
CREAT SERPL-MCNC: 0.67 MG/DL (ref 0.6–1.3)
EOSINOPHIL # BLD AUTO: 0.33 THOUSAND/ÂΜL (ref 0–0.61)
EOSINOPHIL NFR BLD AUTO: 5 % (ref 0–6)
ERYTHROCYTE [DISTWIDTH] IN BLOOD BY AUTOMATED COUNT: 13 % (ref 11.6–15.1)
EST. AVERAGE GLUCOSE BLD GHB EST-MCNC: 100 MG/DL
GFR SERPL CREATININE-BSD FRML MDRD: 99 ML/MIN/1.73SQ M
GLUCOSE P FAST SERPL-MCNC: 78 MG/DL (ref 65–99)
HBA1C MFR BLD: 5.1 %
HCT VFR BLD AUTO: 34.5 % (ref 34.8–46.1)
HGB BLD-MCNC: 10.8 G/DL (ref 11.5–15.4)
IMM GRANULOCYTES # BLD AUTO: 0.03 THOUSAND/UL (ref 0–0.2)
IMM GRANULOCYTES NFR BLD AUTO: 1 % (ref 0–2)
INR PPP: 0.93 (ref 0.85–1.19)
LYMPHOCYTES # BLD AUTO: 2.99 THOUSANDS/ÂΜL (ref 0.6–4.47)
LYMPHOCYTES NFR BLD AUTO: 44 % (ref 14–44)
MCH RBC QN AUTO: 30.6 PG (ref 26.8–34.3)
MCHC RBC AUTO-ENTMCNC: 31.3 G/DL (ref 31.4–37.4)
MCV RBC AUTO: 98 FL (ref 82–98)
MONOCYTES # BLD AUTO: 0.57 THOUSAND/ÂΜL (ref 0.17–1.22)
MONOCYTES NFR BLD AUTO: 9 % (ref 4–12)
NEUTROPHILS # BLD AUTO: 2.65 THOUSANDS/ÂΜL (ref 1.85–7.62)
NEUTS SEG NFR BLD AUTO: 40 % (ref 43–75)
NRBC BLD AUTO-RTO: 0 /100 WBCS
P AXIS: 71 DEGREES
PLATELET # BLD AUTO: 301 THOUSANDS/UL (ref 149–390)
PMV BLD AUTO: 10.2 FL (ref 8.9–12.7)
POTASSIUM SERPL-SCNC: 4.1 MMOL/L (ref 3.5–5.3)
PR INTERVAL: 132 MS
PROT SERPL-MCNC: 6.6 G/DL (ref 6.4–8.4)
PROTHROMBIN TIME: 12.8 SECONDS (ref 12.3–15)
QRS AXIS: 70 DEGREES
QRSD INTERVAL: 68 MS
QT INTERVAL: 390 MS
QTC INTERVAL: 427 MS
RBC # BLD AUTO: 3.53 MILLION/UL (ref 3.81–5.12)
SODIUM SERPL-SCNC: 138 MMOL/L (ref 135–147)
T WAVE AXIS: 81 DEGREES
VENTRICULAR RATE: 72 BPM
WBC # BLD AUTO: 6.63 THOUSAND/UL (ref 4.31–10.16)

## 2025-06-06 PROCEDURE — 93010 ELECTROCARDIOGRAM REPORT: CPT | Performed by: INTERNAL MEDICINE

## 2025-06-06 PROCEDURE — 86901 BLOOD TYPING SEROLOGIC RH(D): CPT | Performed by: ORTHOPAEDIC SURGERY

## 2025-06-06 PROCEDURE — 86900 BLOOD TYPING SEROLOGIC ABO: CPT | Performed by: ORTHOPAEDIC SURGERY

## 2025-06-06 PROCEDURE — 87081 CULTURE SCREEN ONLY: CPT

## 2025-06-06 PROCEDURE — 83036 HEMOGLOBIN GLYCOSYLATED A1C: CPT

## 2025-06-06 PROCEDURE — 80053 COMPREHEN METABOLIC PANEL: CPT

## 2025-06-06 PROCEDURE — 85730 THROMBOPLASTIN TIME PARTIAL: CPT

## 2025-06-06 PROCEDURE — 85025 COMPLETE CBC W/AUTO DIFF WBC: CPT

## 2025-06-06 PROCEDURE — 85610 PROTHROMBIN TIME: CPT

## 2025-06-06 PROCEDURE — 93005 ELECTROCARDIOGRAM TRACING: CPT

## 2025-06-06 PROCEDURE — 36415 COLL VENOUS BLD VENIPUNCTURE: CPT

## 2025-06-06 PROCEDURE — 86850 RBC ANTIBODY SCREEN: CPT | Performed by: ORTHOPAEDIC SURGERY

## 2025-06-07 LAB
ABO GROUP BLD: NORMAL
BLD GP AB SCN SERPL QL: NEGATIVE
MRSA NOSE QL CULT: NORMAL
RH BLD: POSITIVE
SPECIMEN EXPIRATION DATE: NORMAL

## 2025-06-08 LAB
ATRIAL RATE: 72 BPM
P AXIS: 71 DEGREES
PR INTERVAL: 132 MS
QRS AXIS: 70 DEGREES
QRSD INTERVAL: 68 MS
QT INTERVAL: 390 MS
QTC INTERVAL: 427 MS
T WAVE AXIS: 81 DEGREES
VENTRICULAR RATE: 72 BPM

## 2025-06-10 PROBLEM — F41.1 GENERALIZED ANXIETY DISORDER: Status: ACTIVE | Noted: 2019-06-12

## 2025-06-10 PROBLEM — F32.A DEPRESSION: Status: ACTIVE | Noted: 2020-02-07

## 2025-06-10 PROBLEM — G89.29 CHRONIC PAIN OF LEFT KNEE: Status: ACTIVE | Noted: 2020-09-08

## 2025-06-10 PROBLEM — M17.12 PRIMARY OSTEOARTHRITIS OF LEFT KNEE: Status: ACTIVE | Noted: 2025-06-10

## 2025-06-10 PROBLEM — I73.00 RAYNAUD'S PHENOMENON WITHOUT GANGRENE: Status: ACTIVE | Noted: 2019-08-06

## 2025-06-10 PROBLEM — M79.89 SOFT TISSUE MASS: Status: RESOLVED | Noted: 2024-06-11 | Resolved: 2025-06-10

## 2025-06-10 PROBLEM — R92.8 ABNORMAL MAMMOGRAM OF BOTH BREASTS: Status: ACTIVE | Noted: 2021-10-29

## 2025-06-10 PROBLEM — M25.562 CHRONIC PAIN OF LEFT KNEE: Status: ACTIVE | Noted: 2020-09-08

## 2025-06-10 PROBLEM — K31.84 GASTROPARESIS: Status: ACTIVE | Noted: 2019-06-12

## 2025-06-10 PROBLEM — G90.522 COMPLEX REGIONAL PAIN SYNDROME I OF LEFT LOWER LIMB: Status: ACTIVE | Noted: 2021-12-29

## 2025-06-10 PROBLEM — Z90.710 HISTORY OF VAGINAL HYSTERECTOMY: Status: ACTIVE | Noted: 2021-01-05

## 2025-06-10 PROBLEM — Z86.19 HISTORY OF HERPES SIMPLEX INFECTION: Status: ACTIVE | Noted: 2021-01-05

## 2025-06-10 PROBLEM — Z98.890 S/P ACL SURGERY: Status: ACTIVE | Noted: 2019-06-12

## 2025-06-10 NOTE — H&P (VIEW-ONLY)
Internal Medicine Pre-Operative Evaluation:     Reason for Visit: Pre-operative Evaluation for Risk Stratification and Optimization    Patient ID: Laura Rhoades is a 55 y.o. female.     Case: [id number] Date/Time: 07/03/25 0730   Procedure: ARTHROPLASTY KNEE TOTAL W ROBOT (Left: Knee)   Anesthesia type: Choice   Diagnosis: Primary osteoarthritis of left knee [M17.12]   Pre-op diagnosis: Primary osteoarthritis of left knee [M17.12]   Location:  OR ROOM 01 / [facility]   Surgeons: Zaira Santana MD         Recommendations to Proceed withSurgery    Patient is considered to be Low risk for Medium risk procedure.     After evaluation and discussion with patient with emphasis that all surgery has some degree of inherent risk it is acknowledged by patient this risk is Acceptable.    Patient is optimized and may proceed with planned procedure.      Assessment    Pre-operative Medical Evaluation for planned surgery  Recommendations as listed in PLAN section below  Contact surgical nurse  navigator with any questions regarding preoperative plan or schedule.      Assessment & Plan  Preoperative clearance    Primary osteoarthritis of left knee  Failed outpatient conservative measures  Electing to undergo surgical procedure as stated above    Raynaud's phenomenon without gangrene  stable  Depression, unspecified depression type  Take medications as prescribed  Iron deficiency anemia secondary to inadequate dietary iron intake  Started taking iron about a week ago  Will go for iron panel today  Repeat cbc hgb now normal  Had evaluation with SOC and was cleared as well           Plan:     1. Further preoperative workup as follows:   - none no further testing required may proceed with surgery    2. Preoperative Medication Management Review performed by PAT nursing  YES    3. Patient requires further consultation with:   No Consults Required    4. Discharge Planning / Barriers to Discharge  none  identified - patients has post discharge therapy plan in place, transportation arranged for discharge day, adequate family support at home to assist with discharge to home.        Subjective:           History of Present Illness:     Laura Rhoades is a 55 y.o. female who presents to the office today for a preoperative consultation at the request of surgeon. The patient understands this is an elective procedure and not emergent. They are electing to undergo planned procedure with an understanding that all surgery has inherent risk. They have worked with their surgeon and failed conservative treatment measures. Today they present for preoperative risk assessment and recommendations for optimization in preparation for surgery.    Pt seen in center for perioperative medicine for upcoming proposed surgery. They have failed previous conservative measures and have elected surgical intervention.     Pt meets presurgical lab and BMI optimization goals. Pt current hgb 10.8 and was 13 in November check procedure prep. Had not been taking vitamins as prescribed, she started taking iron last week. SHe is going to go for anemia.       Laura Rhoades has an IN HOSPITAL cardiac risk of RCI RISK CLASS I (0 risk factors, risk of major cardiac compl. appr. 0.5%) based on RCRI calculator    Cardiac Risk Estimation: per the Revised Cardiac Risk Index (Circ. 100:1043, 1999),         Pre-op Exam    Previous history of bleeding disorders or clots?: No  Previous Anesthesia reaction?: No  Prolonged steroid use in the last 6 months?: No    Assessment of Cardiac Risk:   - Unstable or severe angina or MI in the last 6 weeks or history of stent placement in the last year?: No   - Decompensated heart failure (e.g. New onset heart failure, NYHA  Class IV heart failure, or worsening existing heart failure)?: No  - Significant arrhythmias such as high grade AV block, symptomatic ventricular arrhythmia, newly recognized ventricular tachycardia,  "supraventricular tachycardia with resting heart rate >100, or symptomatic bradycardia?: No  - Severe heart valve disease including aortic stenosis or symptomatic mitral stenosis?: No      Pre-operative Risk Factors:  Elevated-risk surgery: No    History of cerebrovascular disease: No    History of ischemic heart disease: No  Pre-operative treatment with insulin: No  Pre-operative creatinine >2 mg/dL: No    History of congestive heart failure: No    Duke Activity Status Index (DASI):   DASI Total Score: 28.7  METs: 6.3        ROS: No TIA's or unusual headaches, no dysphagia.  No prolonged cough. No dyspnea or chest pain on exertion.  No abdominal pain, change in bowel habits, black or bloody stools.  No urinary tract or BPH symptoms.  Positive reported pain in arthritic joint. Positive difficulty with gait. No skin rashes or issues.      Objective:    /88 (BP Location: Left arm, Patient Position: Sitting, Cuff Size: Standard)   Ht 5' 5.35\" (1.66 m)   Wt 53 kg (116 lb 12.8 oz)   BMI 19.23 kg/m²       General Appearance: no distress, conversive  HEENT: PERRLA, conjuctiva normal; oropharynx clear; mucous membranes moist;   Neck:  Supple, no lymphadenopathy or thyromegaly  Lungs: breath sounds normal, normal respiratory effort, no retractions, expiratory effort normal  CV: normal heart sounds S1/S2, PMI normal   ABD: soft non tender, no masses , no hepatic or splenomegaly  EXT: DP pulses intact, no lymphadenopathy, no edema  Skin: normal turgor, normal texture, no rash  Psych: affect normal, mood normal  Neuro: AAOx3        The following portions of the patient's history were reviewed and updated as appropriate: allergies, current medications, past family history, past medical history, past social history, past surgical history and problem list.     Past History:       Past Medical History[1] Past Surgical History[2]       Social History[3]  Family History[4]       Allergies:     Allergies[5]     Current " "Medications:     Current Outpatient Medications   Medication Instructions    ALPRAZolam (XANAX) 0.5 mg, Daily at bedtime    ascorbic acid (VITAMIN C) 500 mg, Oral, 2 times daily    Cymbalta 60 MG delayed release capsule     estradiol (ESTRACE) 2 MG tablet 1 tablet, Daily    estradiol (ESTRACE) 2 g    ferrous sulfate 324 mg, Oral, Daily before breakfast    folic acid (FOLVITE) 1 mg, Oral, Daily    gabapentin (NEURONTIN) 100 mg, 3 times daily    meloxicam (MOBIC) 15 mg, Oral, Daily    mupirocin (BACTROBAN) 2 % ointment Apply to bilateral nares twice daily 5 days prior to total joint arthroplasty    phentermine (ADIPEX-P) 37.5 mg, Daily before breakfast    traZODone (DESYREL) 100 mg, Daily at bedtime    valACYclovir (VALTREX) 500 mg tablet TAKE 1 TABLET (500 MG TOTAL) BY MOUTH DAILY. TAKES AS NEEDED           PRE-OP WORKSHEET DATA    Assessment of Pre-Operative Risks     MLJ Quality Hard Stops:    BMI (<40) : Estimated body mass index is 19.23 kg/m² as calculated from the following:    Height as of this encounter: 5' 5.35\" (1.66 m).    Weight as of this encounter: 53 kg (116 lb 12.8 oz).    Hgb ( >11):   Lab Results   Component Value Date    HGB 10.8 (L) 06/06/2025       HbA1c (<7.5) :   Lab Results   Component Value Date    HGBA1C 5.1 06/06/2025       GFR (>60) (Less then 45 = Nephrology consult):    Lab Results   Component Value Date    EGFR 99 06/06/2025    EGFR 96 11/07/2024    EGFR 80 05/16/2024            Pre-Op Data Reviewed:       Laboratory Results: I have personally reviewed the pertinent reports    EKG: I personally reviewed and interpreted available tracings in the electronic medical record    Encounter Date: 06/06/25   ECG 12 lead   Result Value    Ventricular Rate 72    Atrial Rate 72    [place] Interval 132    QRSD Interval 68    QT Interval 390    QTC Interval 427    P Axis 71    QRS Axis 70    T Wave Axis 81    Narrative    Normal sinus rhythm  Normal ECG  No previous ECGs available  Confirmed by Arvind, " Chip ([id number]) on 6/6/2025 11:32:04 AM       OLD RECORDS: reviewed old records in the chart review section if EHR on day of visit.    Previous cardiopulmonary studies within the past year:  Echocardiogram: none    Previous STRESS TEST:  No results found for this or any previous visit.      Previous Cath/PCI:  No results found for this or any previous visit.      ECHO:  No results found for this or any previous visit.              Time of visit including pre-visit chart review, visit and post-visit coordination of plan and care , review of pre-surgical lab work, preparation and time spent documenting note in electronic medical record, time spent face-to-face in physical examination answering patient questions by care team 45 minutes             [facility]         [1]   Past Medical History:  Diagnosis Date    Anxiety     Arthritis     Complex regional pain syndrome i of left lower limb     Depression     Gastroparesis     PONV (postoperative nausea and vomiting)     Raynaud's phenomenon without gangrene    [2]   Past Surgical History:  Procedure Laterality Date    APPENDECTOMY      AUGMENTATION BREAST Bilateral     COLONOSCOPY      EGD AND COLONOSCOPY      HYSTERECTOMY      KNEE SURGERY Left     x4    SPINAL CORD STIMULATOR IMPLANT     [3]   Social History  Tobacco Use    Smoking status: Never    Smokeless tobacco: Never   Vaping Use    Vaping status: Never Used   Substance Use Topics    Alcohol use: Yes     Alcohol/week: 4.0 standard drinks of alcohol     Types: 4 Glasses of wine per week    Drug use: Never   [4] No family history on file.  [5]   Allergies  Allergen Reactions    Bupropion Rash

## 2025-06-10 NOTE — PROGRESS NOTES
Internal Medicine Pre-Operative Evaluation:     Reason for Visit: Pre-operative Evaluation for Risk Stratification and Optimization    Patient ID: Laura Rhoades is a 55 y.o. female.     Case: [id number] Date/Time: 07/03/25 0730   Procedure: ARTHROPLASTY KNEE TOTAL W ROBOT (Left: Knee)   Anesthesia type: Choice   Diagnosis: Primary osteoarthritis of left knee [M17.12]   Pre-op diagnosis: Primary osteoarthritis of left knee [M17.12]   Location:  OR ROOM 01 / [facility]   Surgeons: Zaira Santana MD         Recommendations to Proceed withSurgery    Patient is considered to be Low risk for Medium risk procedure.     After evaluation and discussion with patient with emphasis that all surgery has some degree of inherent risk it is acknowledged by patient this risk is Acceptable.    Patient is optimized and may proceed with planned procedure.      Assessment    Pre-operative Medical Evaluation for planned surgery  Recommendations as listed in PLAN section below  Contact surgical nurse  navigator with any questions regarding preoperative plan or schedule.      Assessment & Plan  Preoperative clearance    Primary osteoarthritis of left knee  Failed outpatient conservative measures  Electing to undergo surgical procedure as stated above    Raynaud's phenomenon without gangrene  stable  Depression, unspecified depression type  Take medications as prescribed  Iron deficiency anemia secondary to inadequate dietary iron intake  Started taking iron about a week ago  Will go for iron panel today  Repeat cbc hgb now normal  Had evaluation with SOC and was cleared as well           Plan:     1. Further preoperative workup as follows:   - none no further testing required may proceed with surgery    2. Preoperative Medication Management Review performed by PAT nursing  YES    3. Patient requires further consultation with:   No Consults Required    4. Discharge Planning / Barriers to Discharge  none  identified - patients has post discharge therapy plan in place, transportation arranged for discharge day, adequate family support at home to assist with discharge to home.        Subjective:           History of Present Illness:     Laura Rhoades is a 55 y.o. female who presents to the office today for a preoperative consultation at the request of surgeon. The patient understands this is an elective procedure and not emergent. They are electing to undergo planned procedure with an understanding that all surgery has inherent risk. They have worked with their surgeon and failed conservative treatment measures. Today they present for preoperative risk assessment and recommendations for optimization in preparation for surgery.    Pt seen in center for perioperative medicine for upcoming proposed surgery. They have failed previous conservative measures and have elected surgical intervention.     Pt meets presurgical lab and BMI optimization goals. Pt current hgb 10.8 and was 13 in November check procedure prep. Had not been taking vitamins as prescribed, she started taking iron last week. SHe is going to go for anemia.       Laura Rhoades has an IN HOSPITAL cardiac risk of RCI RISK CLASS I (0 risk factors, risk of major cardiac compl. appr. 0.5%) based on RCRI calculator    Cardiac Risk Estimation: per the Revised Cardiac Risk Index (Circ. 100:1043, 1999),         Pre-op Exam    Previous history of bleeding disorders or clots?: No  Previous Anesthesia reaction?: No  Prolonged steroid use in the last 6 months?: No    Assessment of Cardiac Risk:   - Unstable or severe angina or MI in the last 6 weeks or history of stent placement in the last year?: No   - Decompensated heart failure (e.g. New onset heart failure, NYHA  Class IV heart failure, or worsening existing heart failure)?: No  - Significant arrhythmias such as high grade AV block, symptomatic ventricular arrhythmia, newly recognized ventricular tachycardia,  "supraventricular tachycardia with resting heart rate >100, or symptomatic bradycardia?: No  - Severe heart valve disease including aortic stenosis or symptomatic mitral stenosis?: No      Pre-operative Risk Factors:  Elevated-risk surgery: No    History of cerebrovascular disease: No    History of ischemic heart disease: No  Pre-operative treatment with insulin: No  Pre-operative creatinine >2 mg/dL: No    History of congestive heart failure: No    Duke Activity Status Index (DASI):   DASI Total Score: 28.7  METs: 6.3        ROS: No TIA's or unusual headaches, no dysphagia.  No prolonged cough. No dyspnea or chest pain on exertion.  No abdominal pain, change in bowel habits, black or bloody stools.  No urinary tract or BPH symptoms.  Positive reported pain in arthritic joint. Positive difficulty with gait. No skin rashes or issues.      Objective:    /88 (BP Location: Left arm, Patient Position: Sitting, Cuff Size: Standard)   Ht 5' 5.35\" (1.66 m)   Wt 53 kg (116 lb 12.8 oz)   BMI 19.23 kg/m²       General Appearance: no distress, conversive  HEENT: PERRLA, conjuctiva normal; oropharynx clear; mucous membranes moist;   Neck:  Supple, no lymphadenopathy or thyromegaly  Lungs: breath sounds normal, normal respiratory effort, no retractions, expiratory effort normal  CV: normal heart sounds S1/S2, PMI normal   ABD: soft non tender, no masses , no hepatic or splenomegaly  EXT: DP pulses intact, no lymphadenopathy, no edema  Skin: normal turgor, normal texture, no rash  Psych: affect normal, mood normal  Neuro: AAOx3        The following portions of the patient's history were reviewed and updated as appropriate: allergies, current medications, past family history, past medical history, past social history, past surgical history and problem list.     Past History:       Past Medical History[1] Past Surgical History[2]       Social History[3]  Family History[4]       Allergies:     Allergies[5]     Current " "Medications:     Current Outpatient Medications   Medication Instructions    ALPRAZolam (XANAX) 0.5 mg, Daily at bedtime    ascorbic acid (VITAMIN C) 500 mg, Oral, 2 times daily    Cymbalta 60 MG delayed release capsule     estradiol (ESTRACE) 2 MG tablet 1 tablet, Daily    estradiol (ESTRACE) 2 g    ferrous sulfate 324 mg, Oral, Daily before breakfast    folic acid (FOLVITE) 1 mg, Oral, Daily    gabapentin (NEURONTIN) 100 mg, 3 times daily    meloxicam (MOBIC) 15 mg, Oral, Daily    mupirocin (BACTROBAN) 2 % ointment Apply to bilateral nares twice daily 5 days prior to total joint arthroplasty    phentermine (ADIPEX-P) 37.5 mg, Daily before breakfast    traZODone (DESYREL) 100 mg, Daily at bedtime    valACYclovir (VALTREX) 500 mg tablet TAKE 1 TABLET (500 MG TOTAL) BY MOUTH DAILY. TAKES AS NEEDED           PRE-OP WORKSHEET DATA    Assessment of Pre-Operative Risks     MLJ Quality Hard Stops:    BMI (<40) : Estimated body mass index is 19.23 kg/m² as calculated from the following:    Height as of this encounter: 5' 5.35\" (1.66 m).    Weight as of this encounter: 53 kg (116 lb 12.8 oz).    Hgb ( >11):   Lab Results   Component Value Date    HGB 10.8 (L) 06/06/2025       HbA1c (<7.5) :   Lab Results   Component Value Date    HGBA1C 5.1 06/06/2025       GFR (>60) (Less then 45 = Nephrology consult):    Lab Results   Component Value Date    EGFR 99 06/06/2025    EGFR 96 11/07/2024    EGFR 80 05/16/2024            Pre-Op Data Reviewed:       Laboratory Results: I have personally reviewed the pertinent reports    EKG: I personally reviewed and interpreted available tracings in the electronic medical record    Encounter Date: 06/06/25   ECG 12 lead   Result Value    Ventricular Rate 72    Atrial Rate 72    VA Interval 132    QRSD Interval 68    QT Interval 390    QTC Interval 427    P Axis 71    QRS Axis 70    T Wave Axis 81    Narrative    Normal sinus rhythm  Normal ECG  No previous ECGs available  Confirmed by Arvind, " Chip ([id number]) on 6/6/2025 11:32:04 AM       OLD RECORDS: reviewed old records in the chart review section if EHR on day of visit.    Previous cardiopulmonary studies within the past year:  Echocardiogram: none    Previous STRESS TEST:  No results found for this or any previous visit.      Previous Cath/PCI:  No results found for this or any previous visit.      ECHO:  No results found for this or any previous visit.              Time of visit including pre-visit chart review, visit and post-visit coordination of plan and care , review of pre-surgical lab work, preparation and time spent documenting note in electronic medical record, time spent face-to-face in physical examination answering patient questions by care team 45 minutes             [facility]         [1]   Past Medical History:  Diagnosis Date    Anxiety     Arthritis     Complex regional pain syndrome i of left lower limb     Depression     Gastroparesis     PONV (postoperative nausea and vomiting)     Raynaud's phenomenon without gangrene    [2]   Past Surgical History:  Procedure Laterality Date    APPENDECTOMY      AUGMENTATION BREAST Bilateral     COLONOSCOPY      EGD AND COLONOSCOPY      HYSTERECTOMY      KNEE SURGERY Left     x4    SPINAL CORD STIMULATOR IMPLANT     [3]   Social History  Tobacco Use    Smoking status: Never    Smokeless tobacco: Never   Vaping Use    Vaping status: Never Used   Substance Use Topics    Alcohol use: Yes     Alcohol/week: 4.0 standard drinks of alcohol     Types: 4 Glasses of wine per week    Drug use: Never   [4] No family history on file.  [5]   Allergies  Allergen Reactions    Bupropion Rash

## 2025-06-10 NOTE — PATIENT INSTRUCTIONS
Hold phentermine for one week prior to surgery    BEFORE SURGERY    Contact your surgical nurse navigator or surgical provider with any questions regarding preoperative plan or schedule.  Stop all over the counter supplements, herbal, naturopathic  medications for 1 week prior to surgery UNLESS prescribed by your surgeon  Hold NSAIDS (i.e. advil, alleve, motrin, ibuprofen, celebrex) minimum 5 days prior to surgery  Follow presurgical medication instructions provided by preadmission nursing team reviewed during your presurgery phone call  Strategies for optimizing your surgery through breathing exercises, nutrition and physical activity can be found at [email or web address]  Call [phone] with any presurgical concerns or medications questions or use the messaging feature in your [employer] keesha to contact your provider    AFTER SURGERY    Recommend using Tylenol ( acetaminophen ) 1000 mg every eight hours during the first week post discharge along with icing the area for 20 mins every 3-4 hours while awake can be helpful in reducing your need for post operative opioid use. This opioid sparing plan can be used along side your surgeons pain plan.  Use stool softener over the counter (colace) daily after surgery during the first 1-2 weeks to avoid post operative constipation issues  If no bowel movement within 3 days after surgery then use over the counter Miralax in addition to your stool softener   If cleared by your surgical team for activity then early and often walking is encouraged and can be important in prevention of post surgical blood clots. Additionally spend as much time out of bed as possible and allowed by your surgical team  Use your incentive spirometer twice per hour in the first seven days after surgery to help prevent post surgery lung complications and infections  It is very important you follow the instructions from your surgeon regarding any medications for after surgery blood clot prevention.  Compliance with these medications or interventions is very important.  Call [phone] with any post discharge concerns or medical issues or use the messaging feature in your [facility] keesha to contact your provider

## 2025-06-11 ENCOUNTER — ANESTHESIA EVENT (OUTPATIENT)
Dept: PERIOP | Facility: HOSPITAL | Age: 55
End: 2025-06-11
Payer: OTHER GOVERNMENT

## 2025-06-11 PROBLEM — D50.8 IRON DEFICIENCY ANEMIA SECONDARY TO INADEQUATE DIETARY IRON INTAKE: Status: ACTIVE | Noted: 2025-06-11

## 2025-06-11 RX ORDER — VALACYCLOVIR HYDROCHLORIDE 500 MG/1
TABLET, FILM COATED ORAL
COMMUNITY
Start: 2025-03-31

## 2025-06-11 RX ORDER — ESTRADIOL 0.1 MG/G
2 CREAM VAGINAL
COMMUNITY
Start: 2025-03-19

## 2025-06-11 RX ORDER — ESTRADIOL 2 MG/1
1 TABLET ORAL DAILY
COMMUNITY
Start: 2025-04-30

## 2025-06-11 RX ORDER — DULOXETINE HYDROCHLORIDE 60 MG/1
CAPSULE, DELAYED RELEASE ORAL
COMMUNITY
Start: 2023-12-01

## 2025-06-11 NOTE — PRE-PROCEDURE INSTRUCTIONS
Pre-Surgery Instructions:   Medication Instructions    ALPRAZolam (XANAX) 0.5 mg tablet Take night before surgery    ascorbic acid (VITAMIN C) 500 MG tablet Hold day of surgery.    Cymbalta 60 MG delayed release capsule Take day of surgery.    estradiol (ESTRACE) 0.1 mg/g vaginal cream Hold day of surgery.    estradiol (ESTRACE) 2 MG tablet Take day of surgery.    ferrous sulfate 324 (65 Fe) mg Hold day of surgery.    folic acid (FOLVITE) 1 mg tablet Hold day of surgery.    gabapentin (NEURONTIN) 100 mg capsule Take day of surgery.    meloxicam (MOBIC) 15 mg tablet Stop taking 3 days prior to surgery.    mupirocin (BACTROBAN) 2 % ointment Instructions provided by MD    traZODone (DESYREL) 100 mg tablet Take night before surgery    valACYclovir (VALTREX) 500 mg tablet Uses PRN- OK to take day of surgery    Medication instructions for day of surgery reviewed. Please take all instructed medications with only a sip of water. Please do not take any over the counter (non-prescribed) vitamins or supplements for one week prior to date of surgery.      You will receive a call one business day prior to surgery with an arrival time and hospital directions. If your surgery is scheduled on a Monday, the hospital will be calling you on the Friday prior to your surgery. If you have not heard from anyone by 8pm, please call the hospital supervisor through the hospital  at [phone].     Do not eat or drink anything after midnight the night before your surgery, including candy, mints, lifesavers, or chewing gum. Do not drink alcohol 24hrs before your surgery. Try not to smoke at least 24hrs before your surgery.       Follow the pre surgery showering instructions as listed in the “My Surgical Experience Booklet” or otherwise provided by your surgeon's office. Do not use a blade to shave the surgical area 1 week before surgery. It is okay to use a clean electric clippers up to 24 hours before surgery. Do not apply any  lotions, creams, including makeup, cologne, deodorant, or perfumes after showering on the day of your surgery. Do not use dry shampoo, hair spray, hair gel, or any type of hair products.     Ortho Joint Class Content reviewed-pt verb understanding . Confirms has DME - inst to bring RW w/ her DOS    No contact lenses, eye make-up, or artificial eyelashes. Remove nail polish, including gel polish, and any artificial, gel, or acrylic nails if possible. Remove all jewelry including rings and body piercing jewelry.     Wear causal clothing that is easy to take on and off. Consider your type of surgery.    Keep any valuables, jewelry, piercings at home. Please bring any specially ordered equipment (sling, braces) if indicated.    Arrange for a responsible person to drive you to and from the hospital on the day of your surgery. Please confirm the visitor policy for the day of your procedure when you receive your phone call with an arrival time.     Call the surgeon's office with any new illnesses, exposures, or additional questions prior to surgery.    Please reference your “My Surgical Experience Booklet” for additional information to prepare for your upcoming surgery.

## 2025-06-11 NOTE — TELEPHONE ENCOUNTER
Spoke to patient, she is in [place] currently, returning Saturday. She will go for Anemia panel Sunday, and is aware to start OTC Ferrous Sulfate (was not provided by pharmacy, but was prescribed in April- she is taking the other vitamins).

## 2025-06-16 ENCOUNTER — TELEPHONE (OUTPATIENT)
Dept: ANESTHESIOLOGY | Facility: CLINIC | Age: 55
End: 2025-06-16

## 2025-06-16 DIAGNOSIS — D64.9 ANEMIA, UNSPECIFIED TYPE: Primary | ICD-10-CM

## 2025-06-17 ENCOUNTER — TELEMEDICINE (OUTPATIENT)
Dept: ANESTHESIOLOGY | Facility: CLINIC | Age: 55
End: 2025-06-17
Payer: OTHER GOVERNMENT

## 2025-06-17 ENCOUNTER — LAB REQUISITION (OUTPATIENT)
Dept: LAB | Facility: HOSPITAL | Age: 55
End: 2025-06-17
Payer: OTHER GOVERNMENT

## 2025-06-17 ENCOUNTER — OFFICE VISIT (OUTPATIENT)
Age: 55
End: 2025-06-17
Payer: OTHER GOVERNMENT

## 2025-06-17 ENCOUNTER — APPOINTMENT (OUTPATIENT)
Dept: LAB | Facility: HOSPITAL | Age: 55
End: 2025-06-17
Payer: OTHER GOVERNMENT

## 2025-06-17 VITALS
HEIGHT: 65 IN | DIASTOLIC BLOOD PRESSURE: 88 MMHG | WEIGHT: 116.8 LBS | SYSTOLIC BLOOD PRESSURE: 120 MMHG | BODY MASS INDEX: 19.46 KG/M2

## 2025-06-17 DIAGNOSIS — Z01.818 PREOPERATIVE CLEARANCE: Primary | ICD-10-CM

## 2025-06-17 DIAGNOSIS — M17.12 PRIMARY OSTEOARTHRITIS OF LEFT KNEE: ICD-10-CM

## 2025-06-17 DIAGNOSIS — M17.12 UNILATERAL PRIMARY OSTEOARTHRITIS, LEFT KNEE: ICD-10-CM

## 2025-06-17 DIAGNOSIS — F41.1 GENERALIZED ANXIETY DISORDER: ICD-10-CM

## 2025-06-17 DIAGNOSIS — D50.8 IRON DEFICIENCY ANEMIA SECONDARY TO INADEQUATE DIETARY IRON INTAKE: ICD-10-CM

## 2025-06-17 DIAGNOSIS — I73.00 RAYNAUD'S PHENOMENON WITHOUT GANGRENE: ICD-10-CM

## 2025-06-17 DIAGNOSIS — Z00.6 ENCOUNTER FOR EXAMINATION FOR NORMAL COMPARISON OR CONTROL IN CLINICAL RESEARCH PROGRAM: ICD-10-CM

## 2025-06-17 DIAGNOSIS — D64.9 ANEMIA, UNSPECIFIED TYPE: ICD-10-CM

## 2025-06-17 DIAGNOSIS — F32.A DEPRESSION, UNSPECIFIED DEPRESSION TYPE: ICD-10-CM

## 2025-06-17 DIAGNOSIS — Z01.818 PREOPERATIVE CLEARANCE: ICD-10-CM

## 2025-06-17 LAB
ABO GROUP BLD: NORMAL
ALBUMIN SERPL BCG-MCNC: 4.2 G/DL (ref 3.5–5)
ALP SERPL-CCNC: 48 U/L (ref 34–104)
ALT SERPL W P-5'-P-CCNC: 29 U/L (ref 7–52)
ANION GAP SERPL CALCULATED.3IONS-SCNC: 6 MMOL/L (ref 4–13)
APTT PPP: 27 SECONDS (ref 23–34)
AST SERPL W P-5'-P-CCNC: 25 U/L (ref 13–39)
BASOPHILS # BLD AUTO: 0.03 THOUSANDS/ÂΜL (ref 0–0.1)
BASOPHILS NFR BLD AUTO: 1 % (ref 0–1)
BILIRUB SERPL-MCNC: 0.47 MG/DL (ref 0.2–1)
BLD GP AB SCN SERPL QL: NEGATIVE
BUN SERPL-MCNC: 15 MG/DL (ref 5–25)
CALCIUM SERPL-MCNC: 9.1 MG/DL (ref 8.4–10.2)
CHLORIDE SERPL-SCNC: 100 MMOL/L (ref 96–108)
CO2 SERPL-SCNC: 30 MMOL/L (ref 21–32)
CREAT SERPL-MCNC: 0.73 MG/DL (ref 0.6–1.3)
EOSINOPHIL # BLD AUTO: 0.17 THOUSAND/ÂΜL (ref 0–0.61)
EOSINOPHIL NFR BLD AUTO: 5 % (ref 0–6)
ERYTHROCYTE [DISTWIDTH] IN BLOOD BY AUTOMATED COUNT: 12.1 % (ref 11.6–15.1)
GFR SERPL CREATININE-BSD FRML MDRD: 93 ML/MIN/1.73SQ M
GLUCOSE SERPL-MCNC: 83 MG/DL (ref 65–140)
HCT VFR BLD AUTO: 42.5 % (ref 34.8–46.1)
HGB BLD-MCNC: 13.7 G/DL (ref 11.5–15.4)
IMM GRANULOCYTES # BLD AUTO: 0.01 THOUSAND/UL (ref 0–0.2)
IMM GRANULOCYTES NFR BLD AUTO: 0 % (ref 0–2)
INR PPP: 0.97 (ref 0.85–1.19)
LYMPHOCYTES # BLD AUTO: 0.85 THOUSANDS/ÂΜL (ref 0.6–4.47)
LYMPHOCYTES NFR BLD AUTO: 22 % (ref 14–44)
MCH RBC QN AUTO: 33.3 PG (ref 26.8–34.3)
MCHC RBC AUTO-ENTMCNC: 32.2 G/DL (ref 31.4–37.4)
MCV RBC AUTO: 103 FL (ref 82–98)
MONOCYTES # BLD AUTO: 0.36 THOUSAND/ÂΜL (ref 0.17–1.22)
MONOCYTES NFR BLD AUTO: 10 % (ref 4–12)
NEUTROPHILS # BLD AUTO: 2.37 THOUSANDS/ÂΜL (ref 1.85–7.62)
NEUTS SEG NFR BLD AUTO: 62 % (ref 43–75)
NRBC BLD AUTO-RTO: 0 /100 WBCS
PLATELET # BLD AUTO: 204 THOUSANDS/UL (ref 149–390)
PMV BLD AUTO: 9.6 FL (ref 8.9–12.7)
POTASSIUM SERPL-SCNC: 3.6 MMOL/L (ref 3.5–5.3)
PROT SERPL-MCNC: 6.5 G/DL (ref 6.4–8.4)
PROTHROMBIN TIME: 13.3 SECONDS (ref 12.3–15)
RBC # BLD AUTO: 4.11 MILLION/UL (ref 3.81–5.12)
RH BLD: POSITIVE
SODIUM SERPL-SCNC: 136 MMOL/L (ref 135–147)
SPECIMEN EXPIRATION DATE: NORMAL
WBC # BLD AUTO: 3.79 THOUSAND/UL (ref 4.31–10.16)

## 2025-06-17 PROCEDURE — 85610 PROTHROMBIN TIME: CPT

## 2025-06-17 PROCEDURE — 36415 COLL VENOUS BLD VENIPUNCTURE: CPT

## 2025-06-17 PROCEDURE — 99214 OFFICE O/P EST MOD 30 MIN: CPT | Performed by: NURSE PRACTITIONER

## 2025-06-17 PROCEDURE — 99204 OFFICE O/P NEW MOD 45 MIN: CPT | Performed by: NURSE PRACTITIONER

## 2025-06-17 PROCEDURE — 86850 RBC ANTIBODY SCREEN: CPT | Performed by: ORTHOPAEDIC SURGERY

## 2025-06-17 PROCEDURE — 86901 BLOOD TYPING SEROLOGIC RH(D): CPT | Performed by: ORTHOPAEDIC SURGERY

## 2025-06-17 PROCEDURE — 86900 BLOOD TYPING SEROLOGIC ABO: CPT | Performed by: ORTHOPAEDIC SURGERY

## 2025-06-17 PROCEDURE — 85730 THROMBOPLASTIN TIME PARTIAL: CPT

## 2025-06-17 PROCEDURE — 80053 COMPREHEN METABOLIC PANEL: CPT

## 2025-06-17 PROCEDURE — 85025 COMPLETE CBC W/AUTO DIFF WBC: CPT

## 2025-06-17 NOTE — ASSESSMENT & PLAN NOTE
Started taking iron about a week ago  Will go for iron panel today  Repeat cbc hgb now normal  Had evaluation with SOC and was cleared as well

## 2025-06-17 NOTE — ASSESSMENT & PLAN NOTE
consult for SOC anemia review complete   Last Hemoglobin on 6.17.25 recovered at 13.7   Assume false reading of anemia (on date 6.6) related to possible volume dilution from IV hydration/electrolyte /rejuvenation therapy she had on 6/5/2025 at a medical [facility].   Latest hemoglobin is in line with her past readings from 2024 at 13.7   No further presurgery IV needs   Instructed to continue to monitor with PCP  Had 2 past colonoscopies that were negative  Continuing p.o. iron per major lower joint protocol

## 2025-06-17 NOTE — PROGRESS NOTES
SOC CONSULT   SOC ANEMIA REVIEW - no further pre-surgery IV iron needs     Brief Visit  Name: Laura Rhoades      : 1970      MRN: [id number]  Encounter Provider: CHAS Knight  Encounter Date: 2025   Encounter department: [facility]      ASSESSMENT   55 year old female referred to SOC for pre-surgery optimization & BEST program   Other consult concerns include:   She is scheduled on   Case: [id number] Date/Time: 25 0730   Procedure: ARTHROPLASTY KNEE TOTAL W ROBOT (Left: Knee)   Anesthesia type: Choice   Diagnosis: Primary osteoarthritis of left knee [M17.12]   Pre-op diagnosis: Primary osteoarthritis of left knee [M17.12]   Location: [facility] OR ROOM 01 / [facility]   Surgeons: Zaira Santana MD       Assessment & Plan  Anemia, unspecified type   consult for SOC anemia review complete   Last Hemoglobin on 25 recovered at 13.7   Assume false reading of anemia (on date 6.6) related to possible volume dilution from IV hydration/electrolyte /rejuvenation therapy she had on 2025 at a medical spa.   Latest hemoglobin is in line with her past readings from  at 13.7   No further presurgery IV needs   Instructed to continue to monitor with PCP  Had 2 past colonoscopies that were negative  Continuing p.o. iron per major lower joint protocol        History of Present Illness   HPI    Chanda is a 55-year-old female who was referred to SOC for presurgery optimization.  Other consult concerns include SOC anemia review.  She has been managing ongoing left knee pain and is electing to have a left knee replacement.    I spoke to Chanda over the telephone.  We did not connect via video.  She was offered a live visit and declined.  States that she just prefers a telephone because she did not have to go anywhere    She admits to being well today.  Denies any new developments in her health.  Denies chest pain.  Denies shortness of breath.  Past  medical history reviewed is stable    She was referred to the SOC today due to a lab result of hemoglobin 10.6 showing she was anemic on June 6, 2025.  This was a new finding for her.  States her father is anemic.  She was sent for more recent blood work this morning 6.16.24.  Latest hemoglobin came back normal at 13.7.  This hemoglobin is in line with her other labs in care everywhere from 2024.    Assume false reading of anemia (on date 6.6) related to possible volume dilution from IV hydration/electrolyte /rejuvenation therapy she had on 6/5/2025 at a medical spa. Latest hemoglobin is in line with her past readings from 2024 at 13.7 No further presurgery IV needs     Denies fevers and denies chills  Denies congestion and denies sore throat  Denies chest pain  Denies palpitations  Denies shortness of breath  Denies abdominal pain  Denies nausea, vomiting, and diarrhea  Denies any issues with their skin... example NO open wounds or sores  Denies rashes  Denies difficulty urinating  Denies any issues with their urine... example a dark color or an odor  Denies dizziness  Denies headaches  Denies confusion and denies hallucinations    Admits to being in well health today       Physical Assessment   We did not connect via video  Patient was alert and orientated times 3  Mood appropriate  Thought appropriate    I heard no respiratory distress over the phone today      Administrative Statements   Encounter provider CHAS Knight    The Patient is located at Home and in the following state in which I hold an active license [place].    The patient was identified by name and date of birth. Laura Rhoades was informed that this is a telemedicine visit and that the visit is being conducted through Telephone.  My office door was closed. No one else was in the room.  She acknowledged consent and understanding of privacy and security of the platform. The patient has agreed to participate and understands they can discontinue the  visit at any time.    I have spent a total time of 20 minutes in caring for this patient on the day of the visit/encounter including Prognosis, Risks and benefits of tx options, Instructions for management, Patient and family education, and Importance of tx compliance, not including the time spent for establishing the audio/video connection.

## 2025-06-17 NOTE — PROGRESS NOTES
[facility] (SOC)  CONSULT   Patient has the ability to take their vital signs at home NO  Allergies reviewed today   PMH reviewed today   Medications reviewed today   Nutrition Assessment Score: 12  METS: 9.25  DX SLEEP APNEA;NO:SCORE 1     As always we discussed having your BEST surgery, and BEST recovery.  Surgery goals reviewed today.    Breathing exercises   Patient was encouraged to begin lung exercises today.  This could be accomplished through deep breathing and cough exercises.     Eating/nutrition   Encouraged patient to increase oral protein intake prior to surgery.  This can be accomplished by consuming chicken, fish, tuna fish, cottage cheese, cheese, eggs, Greek yogurt, and protein shakes as needed.  I encouraged use of protein shakes such ENLIVE.  I also recommended making your own protein shakes with protein powder.   Sleep/Stress management  Patient was encouraged to rest their body prior to surgery.  Encouraged attempting to get 8 hours of sleep at night.  Avoid stress.  Avoid sick contacts.  Encouraged to find a relaxing hobby such as reading, meditation, listening to music.  Training exercises  Patient was encouraged to remain active as possible.  Today bilateral lower extremity generic exercises were taught for muscle strengthening and balance.  All exercises to be done sitting down.

## 2025-06-23 ENCOUNTER — EVALUATION (OUTPATIENT)
Dept: PHYSICAL THERAPY | Facility: CLINIC | Age: 55
End: 2025-06-23
Payer: OTHER GOVERNMENT

## 2025-06-23 DIAGNOSIS — M25.562 CHRONIC PAIN OF LEFT KNEE: Primary | ICD-10-CM

## 2025-06-23 DIAGNOSIS — G89.29 CHRONIC PAIN OF LEFT KNEE: Primary | ICD-10-CM

## 2025-06-23 PROCEDURE — 97530 THERAPEUTIC ACTIVITIES: CPT | Performed by: PHYSICAL THERAPIST

## 2025-06-23 PROCEDURE — 97161 PT EVAL LOW COMPLEX 20 MIN: CPT | Performed by: PHYSICAL THERAPIST

## 2025-06-23 NOTE — PROGRESS NOTES
PT Evaluation     Today's date: 2025  Patient name: Laura Rhoades  : 1970  MRN: [id number]  Referring provider: Zaira Santana MD  Dx:   Encounter Diagnosis     ICD-10-CM    1. Chronic pain of left knee  M25.562     G89.29           Start Time: 1705  Stop Time: 1741  Total time in clinic (min): 36 minutes    Assessment  Impairments: abnormal gait, abnormal or restricted ROM, abnormal movement, activity intolerance, impaired balance, impaired physical strength, lacks appropriate home exercise program, pain with function, poor posture , poor body mechanics, participation limitations, activity limitations and endurance    Assessment details: Laura Rhoades is a 55 y.o. female who presents for pre-op TKR to be done 7/3 by Dr Santana. Patient presents with pain, decreased strength, decreased ROM, decreased joint mobility, and ambulatory dysfunction. Due to these impairments, Patient has difficulty performing ambulating, prolonged standing, squatting, weight bearing over involved LE, and stair negotiating . Patient would benefit from skilled physical therapy to address the impairments, improve their level of function, and to improve their overall quality of life. Reviewed HEP, involved anatomy, physio as well as POC with verbalized understanding and pt is in agreement with above.     Goals  Short Term Goals: to be achieved by 4 weeks  1) Patient to be independent with basic HEP  2) Decrease pain to 2/10 at its worst  3) Increase knee ROM by 5-10 degrees in all affected planes  4) Increase LE strength by 1/2 MMT grade in all affected planes    Long Term Goals: to be achieved by discharge  1) FOTO equal to or greater than projected goal.  2) Ambulation to improve to maximal level of function  3) Stair negotiation will improve to reciprocal  4) Sit to stand transfers will improve to maximal level of function      Plan  Patient would benefit from: skilled physical therapy    Planned therapy interventions:  abdominal trunk stabilization, ADL training, balance, body mechanics training, home exercise program, functional ROM exercises, flexibility, therapeutic exercise, therapeutic activities, stretching, strengthening, joint mobilization, manual therapy, neuromuscular re-education, patient education and postural training    Frequency: 2x week  Duration in weeks: 8  Treatment plan discussed with: patient      Subjective Evaluation    History of Present Illness  Mechanism of injury: History: Pt reports she tore her ACL in 2019 at a \[facility]\"" in [place]. She returned to [facility]. She wasn't getting much better with PT once she moved back to [place]. She had some imaging and after MRI and x-rays were done, it was recommended by Dr Oneill to have a second ACL reconstruction in 2019. She did do PT afterwards however never was without pain. Pt lived in a townhouse with 3 stories and is a lot of steps causing her some difficulty. 2 years after the second ACL repair, she had cortisone and gel shots without any relief. She had a knee scope without any sig relief. The pain management Drs were not helping per pt. She had a nerve ablation done and no great relief. Saw Dr Adan for spinal consult about nerve damage. Placed on Gabapentin and Cymbalta resulting in some pain relief. Had spinal cord implant done Dec of 2024. Able to now determine arthritic vs nerve pain. Saw Dr Santana for a second consult, and decided to go for the TKR. Goes down the steps one at a time. AM is better, more time on her feet makes it feel worse.    Aggravating factors: standing, walking, steps  Relieving factors:  Imaging: x-ray, MRI  Occupation: teacher (retired)  Hobbies/recreation: piliates, walk the dog, BAR3 classes  Patient Goals  Patient goals for therapy: decreased pain, increased motion, increased strength and return to sport/leisure activities    Pain  Current pain ratin  At best pain rating: 3  At worst pain ratin  Quality: dull ache  and discomfort  Relieving factors: rest  Aggravating factors: standing, walking and stair climbing    Social Support  Steps to enter house: yes  Stairs in house: yes   Lives in: multiple-level home  Lives with: spouse      Diagnostic Tests  X-ray: abnormal  MRI studies: abnormal  Treatments  Previous treatment: injection treatment, medication and physical therapy  Current treatment: physical therapy        Objective     Active Range of Motion   Left Knee   Flexion: 132 degrees   Extension: -5 degrees     Right Knee   Normal active range of motion    Passive Range of Motion   Left Knee   Flexion: 132 degrees   Extension: 0 degrees with pain    Mobility   Patellar Mobility:   Left Knee   WFL: medial, lateral, superior and inferior.     Strength/Myotome Testing     Left Hip   Planes of Motion   Flexion: 4+  Extension: 4  Abduction: 4-  External rotation: 4  Internal rotation: 4    Right Knee   Flexion: 4  Extension: 4-             Precautions: anxiety, depression      Manuals 6/23                                                                Neuro Re-Ed                                                                                                        Ther Ex             NuStep             LAQ             Heel slides             Calf S                                                                 Ther Activity             Pt edu: involved anatomy, physio, HEP, POC, movement patterns  RE                         Gait Training                                       Modalities

## 2025-06-24 LAB
APOB+LDLR+PCSK9 GENE MUT ANL BLD/T: NOT DETECTED
BRCA1+BRCA2 DEL+DUP + FULL MUT ANL BLD/T: NOT DETECTED
MLH1+MSH2+MSH6+PMS2 GN DEL+DUP+FUL M: NOT DETECTED

## 2025-07-03 ENCOUNTER — APPOINTMENT (OUTPATIENT)
Dept: RADIOLOGY | Facility: HOSPITAL | Age: 55
End: 2025-07-03
Payer: OTHER GOVERNMENT

## 2025-07-03 ENCOUNTER — HOSPITAL ENCOUNTER (OUTPATIENT)
Facility: HOSPITAL | Age: 55
Setting detail: OUTPATIENT SURGERY
Discharge: HOME | End: 2025-07-04
Attending: ORTHOPAEDIC SURGERY | Admitting: ORTHOPAEDIC SURGERY
Payer: OTHER GOVERNMENT

## 2025-07-03 ENCOUNTER — ANESTHESIA (OUTPATIENT)
Dept: PERIOP | Facility: HOSPITAL | Age: 55
End: 2025-07-03
Payer: OTHER GOVERNMENT

## 2025-07-03 DIAGNOSIS — Z96.652 S/P TOTAL KNEE ARTHROPLASTY, LEFT: ICD-10-CM

## 2025-07-03 DIAGNOSIS — D64.9 ANEMIA, UNSPECIFIED TYPE: Primary | ICD-10-CM

## 2025-07-03 PROCEDURE — C1776 JOINT DEVICE (IMPLANTABLE): HCPCS | Performed by: ORTHOPAEDIC SURGERY

## 2025-07-03 PROCEDURE — 97163 PT EVAL HIGH COMPLEX 45 MIN: CPT

## 2025-07-03 PROCEDURE — 97167 OT EVAL HIGH COMPLEX 60 MIN: CPT

## 2025-07-03 PROCEDURE — C1713 ANCHOR/SCREW BN/BN,TIS/BN: HCPCS | Performed by: ORTHOPAEDIC SURGERY

## 2025-07-03 PROCEDURE — 27447 TOTAL KNEE ARTHROPLASTY: CPT | Performed by: ORTHOPAEDIC SURGERY

## 2025-07-03 PROCEDURE — 97535 SELF CARE MNGMENT TRAINING: CPT

## 2025-07-03 PROCEDURE — 97110 THERAPEUTIC EXERCISES: CPT

## 2025-07-03 PROCEDURE — 97116 GAIT TRAINING THERAPY: CPT

## 2025-07-03 PROCEDURE — 73560 X-RAY EXAM OF KNEE 1 OR 2: CPT

## 2025-07-03 PROCEDURE — 27447 TOTAL KNEE ARTHROPLASTY: CPT | Performed by: PHYSICIAN ASSISTANT

## 2025-07-03 PROCEDURE — S2900 ROBOTIC SURGICAL SYSTEM: HCPCS | Performed by: ORTHOPAEDIC SURGERY

## 2025-07-03 PROCEDURE — S2900 ROBOTIC SURGICAL SYSTEM: HCPCS | Performed by: PHYSICIAN ASSISTANT

## 2025-07-03 DEVICE — 3.5MM CORTEX SCREW SELF-TAPPING 38MM: Type: IMPLANTABLE DEVICE | Site: TIBIA | Status: FUNCTIONAL

## 2025-07-03 DEVICE — ATTUNE PATELLA MEDIALIZED DOME 38MM CEMENTED AOX
Type: IMPLANTABLE DEVICE | Site: KNEE | Status: FUNCTIONAL
Brand: ATTUNE

## 2025-07-03 DEVICE — 3.5MM LCP T-PLATE 3H HEAD/ 5H SHAFT/67MM-RIGHT ANGLE
Type: IMPLANTABLE DEVICE | Site: TIBIA | Status: FUNCTIONAL
Brand: LCP

## 2025-07-03 DEVICE — ATTUNE KNEE SYSTEM FEMORAL POROCOAT CRUCIATE RETAINING NARROW SIZE 5N LEFT CEMENTLESS
Type: IMPLANTABLE DEVICE | Site: KNEE | Status: FUNCTIONAL
Brand: ATTUNE

## 2025-07-03 DEVICE — SMARTSET HV HIGH VISCOSITY BONE CEMENT 40G
Type: IMPLANTABLE DEVICE | Site: KNEE | Status: FUNCTIONAL
Brand: SMARTSET

## 2025-07-03 DEVICE — 3.5MM CORTEX SCREW SELF-TAPPING 26MM: Type: IMPLANTABLE DEVICE | Site: TIBIA | Status: FUNCTIONAL

## 2025-07-03 DEVICE — ATTUNE KNEE SYSTEM TIBIAL BASE FIXED BEARING SIZE 5 CEMENTED
Type: IMPLANTABLE DEVICE | Site: KNEE | Status: FUNCTIONAL
Brand: ATTUNE

## 2025-07-03 DEVICE — ATTUNE KNEE SYSTEM TIBIAL INSERT FIXED BEARING MEDIAL STABILIZED LEFT AOX 5, 5MM
Type: IMPLANTABLE DEVICE | Site: KNEE | Status: FUNCTIONAL
Brand: ATTUNE

## 2025-07-03 DEVICE — 3.5MM CORTEX SCREW SELF-TAPPING 30MM: Type: IMPLANTABLE DEVICE | Site: TIBIA | Status: FUNCTIONAL

## 2025-07-03 RX ORDER — MAGNESIUM HYDROXIDE 1200 MG/15ML
LIQUID ORAL AS NEEDED
Status: DISCONTINUED | OUTPATIENT
Start: 2025-07-03 | End: 2025-07-03 | Stop reason: HOSPADM

## 2025-07-03 RX ORDER — OXYCODONE HYDROCHLORIDE 5 MG/1
5 TABLET ORAL EVERY 6 HOURS PRN
Qty: 20 TABLET | Refills: 0 | Status: SHIPPED | OUTPATIENT
Start: 2025-07-03 | End: 2025-07-13

## 2025-07-03 RX ORDER — HYDROMORPHONE HCL/PF 1 MG/ML
0.5 SYRINGE (ML) INJECTION EVERY 2 HOUR PRN
Status: DISCONTINUED | OUTPATIENT
Start: 2025-07-03 | End: 2025-07-04 | Stop reason: HOSPADM

## 2025-07-03 RX ORDER — METOCLOPRAMIDE HYDROCHLORIDE 5 MG/ML
10 INJECTION INTRAMUSCULAR; INTRAVENOUS ONCE AS NEEDED
Status: DISCONTINUED | OUTPATIENT
Start: 2025-07-03 | End: 2025-07-03 | Stop reason: HOSPADM

## 2025-07-03 RX ORDER — METHOCARBAMOL 750 MG/1
750 TABLET, FILM COATED ORAL EVERY 6 HOURS SCHEDULED
Status: DISCONTINUED | OUTPATIENT
Start: 2025-07-03 | End: 2025-07-04 | Stop reason: HOSPADM

## 2025-07-03 RX ORDER — DULOXETIN HYDROCHLORIDE 60 MG/1
60 CAPSULE, DELAYED RELEASE ORAL DAILY
Status: DISCONTINUED | OUTPATIENT
Start: 2025-07-04 | End: 2025-07-04 | Stop reason: HOSPADM

## 2025-07-03 RX ORDER — FENTANYL CITRATE/PF 50 MCG/ML
50 SYRINGE (ML) INJECTION
Refills: 0 | Status: DISCONTINUED | OUTPATIENT
Start: 2025-07-03 | End: 2025-07-03 | Stop reason: HOSPADM

## 2025-07-03 RX ORDER — CELECOXIB 200 MG/1
200 CAPSULE ORAL DAILY
Qty: 30 CAPSULE | Refills: 0 | Status: SHIPPED | OUTPATIENT
Start: 2025-07-03 | End: 2025-07-15 | Stop reason: SDUPTHER

## 2025-07-03 RX ORDER — SODIUM CHLORIDE, SODIUM LACTATE, POTASSIUM CHLORIDE, CALCIUM CHLORIDE 600; 310; 30; 20 MG/100ML; MG/100ML; MG/100ML; MG/100ML
125 INJECTION, SOLUTION INTRAVENOUS CONTINUOUS
Status: DISCONTINUED | OUTPATIENT
Start: 2025-07-03 | End: 2025-07-03

## 2025-07-03 RX ORDER — SODIUM CHLORIDE, SODIUM LACTATE, POTASSIUM CHLORIDE, CALCIUM CHLORIDE 600; 310; 30; 20 MG/100ML; MG/100ML; MG/100ML; MG/100ML
20 INJECTION, SOLUTION INTRAVENOUS CONTINUOUS
Status: DISCONTINUED | OUTPATIENT
Start: 2025-07-03 | End: 2025-07-03

## 2025-07-03 RX ORDER — ESTRADIOL 1 MG/1
2 TABLET ORAL DAILY
Status: DISCONTINUED | OUTPATIENT
Start: 2025-07-04 | End: 2025-07-04 | Stop reason: HOSPADM

## 2025-07-03 RX ORDER — FENTANYL CITRATE 50 UG/ML
INJECTION, SOLUTION INTRAMUSCULAR; INTRAVENOUS AS NEEDED
Status: DISCONTINUED | OUTPATIENT
Start: 2025-07-03 | End: 2025-07-03

## 2025-07-03 RX ORDER — ENOXAPARIN SODIUM 100 MG/ML
40 INJECTION SUBCUTANEOUS DAILY
Status: DISCONTINUED | OUTPATIENT
Start: 2025-07-04 | End: 2025-07-04 | Stop reason: HOSPADM

## 2025-07-03 RX ORDER — MORPHINE SULFATE 10 MG/ML
INJECTION, SOLUTION INTRAMUSCULAR; INTRAVENOUS AS NEEDED
Status: DISCONTINUED | OUTPATIENT
Start: 2025-07-03 | End: 2025-07-03 | Stop reason: HOSPADM

## 2025-07-03 RX ORDER — ONDANSETRON 2 MG/ML
INJECTION INTRAMUSCULAR; INTRAVENOUS AS NEEDED
Status: DISCONTINUED | OUTPATIENT
Start: 2025-07-03 | End: 2025-07-03

## 2025-07-03 RX ORDER — ACETAMINOPHEN 325 MG/1
975 TABLET ORAL ONCE
Status: COMPLETED | OUTPATIENT
Start: 2025-07-03 | End: 2025-07-03

## 2025-07-03 RX ORDER — BUPIVACAINE HYDROCHLORIDE 5 MG/ML
INJECTION, SOLUTION EPIDURAL; INTRACAUDAL; PERINEURAL AS NEEDED
Status: DISCONTINUED | OUTPATIENT
Start: 2025-07-03 | End: 2025-07-03

## 2025-07-03 RX ORDER — METHOCARBAMOL 500 MG/1
500 TABLET, FILM COATED ORAL 3 TIMES DAILY PRN
Qty: 30 TABLET | Refills: 0 | Status: SHIPPED | OUTPATIENT
Start: 2025-07-03

## 2025-07-03 RX ORDER — DOCUSATE SODIUM 100 MG/1
100 CAPSULE, LIQUID FILLED ORAL 2 TIMES DAILY
Status: DISCONTINUED | OUTPATIENT
Start: 2025-07-03 | End: 2025-07-04 | Stop reason: HOSPADM

## 2025-07-03 RX ORDER — ALPRAZOLAM 0.5 MG
0.5 TABLET ORAL
Status: DISCONTINUED | OUTPATIENT
Start: 2025-07-03 | End: 2025-07-04 | Stop reason: HOSPADM

## 2025-07-03 RX ORDER — ASCORBIC ACID 500 MG
500 TABLET ORAL 2 TIMES DAILY
Status: DISCONTINUED | OUTPATIENT
Start: 2025-07-03 | End: 2025-07-04 | Stop reason: HOSPADM

## 2025-07-03 RX ORDER — PROPOFOL 10 MG/ML
INJECTION, EMULSION INTRAVENOUS CONTINUOUS PRN
Status: DISCONTINUED | OUTPATIENT
Start: 2025-07-03 | End: 2025-07-03

## 2025-07-03 RX ORDER — SODIUM CHLORIDE, SODIUM LACTATE, POTASSIUM CHLORIDE, CALCIUM CHLORIDE 600; 310; 30; 20 MG/100ML; MG/100ML; MG/100ML; MG/100ML
INJECTION, SOLUTION INTRAVENOUS CONTINUOUS PRN
Status: DISCONTINUED | OUTPATIENT
Start: 2025-07-03 | End: 2025-07-03

## 2025-07-03 RX ORDER — DOCUSATE SODIUM 100 MG/1
100 CAPSULE, LIQUID FILLED ORAL 2 TIMES DAILY
Qty: 10 CAPSULE | Refills: 0 | Status: SHIPPED | OUTPATIENT
Start: 2025-07-03

## 2025-07-03 RX ORDER — ONDANSETRON 4 MG/1
4 TABLET, FILM COATED ORAL EVERY 8 HOURS PRN
Qty: 5 TABLET | Refills: 0 | Status: SHIPPED | OUTPATIENT
Start: 2025-07-03

## 2025-07-03 RX ORDER — TRAZODONE HYDROCHLORIDE 100 MG/1
100 TABLET ORAL
Status: DISCONTINUED | OUTPATIENT
Start: 2025-07-03 | End: 2025-07-04 | Stop reason: HOSPADM

## 2025-07-03 RX ORDER — CALCIUM CARBONATE 500 MG/1
1000 TABLET, CHEWABLE ORAL DAILY PRN
Status: DISCONTINUED | OUTPATIENT
Start: 2025-07-03 | End: 2025-07-04 | Stop reason: HOSPADM

## 2025-07-03 RX ORDER — ACETAMINOPHEN 325 MG/1
650 TABLET ORAL EVERY 6 HOURS PRN
Status: DISCONTINUED | OUTPATIENT
Start: 2025-07-03 | End: 2025-07-04 | Stop reason: HOSPADM

## 2025-07-03 RX ORDER — CHLORHEXIDINE GLUCONATE 40 MG/ML
SOLUTION TOPICAL DAILY PRN
Status: DISCONTINUED | OUTPATIENT
Start: 2025-07-03 | End: 2025-07-03

## 2025-07-03 RX ORDER — SODIUM CHLORIDE, SODIUM LACTATE, POTASSIUM CHLORIDE, CALCIUM CHLORIDE 600; 310; 30; 20 MG/100ML; MG/100ML; MG/100ML; MG/100ML
75 INJECTION, SOLUTION INTRAVENOUS CONTINUOUS
Status: DISCONTINUED | OUTPATIENT
Start: 2025-07-03 | End: 2025-07-04 | Stop reason: HOSPADM

## 2025-07-03 RX ORDER — OXYCODONE HYDROCHLORIDE 10 MG/1
10 TABLET ORAL EVERY 4 HOURS PRN
Status: DISCONTINUED | OUTPATIENT
Start: 2025-07-03 | End: 2025-07-04 | Stop reason: HOSPADM

## 2025-07-03 RX ORDER — GABAPENTIN 100 MG/1
100 CAPSULE ORAL 3 TIMES DAILY
Status: DISCONTINUED | OUTPATIENT
Start: 2025-07-03 | End: 2025-07-04 | Stop reason: HOSPADM

## 2025-07-03 RX ORDER — MIDAZOLAM HYDROCHLORIDE 2 MG/2ML
INJECTION, SOLUTION INTRAMUSCULAR; INTRAVENOUS AS NEEDED
Status: DISCONTINUED | OUTPATIENT
Start: 2025-07-03 | End: 2025-07-03

## 2025-07-03 RX ORDER — GLYCOPYRROLATE 0.2 MG/ML
INJECTION INTRAMUSCULAR; INTRAVENOUS AS NEEDED
Status: DISCONTINUED | OUTPATIENT
Start: 2025-07-03 | End: 2025-07-03

## 2025-07-03 RX ORDER — ASPIRIN 81 MG/1
81 TABLET ORAL 2 TIMES DAILY
Qty: 70 TABLET | Refills: 0 | Status: SHIPPED | OUTPATIENT
Start: 2025-07-03

## 2025-07-03 RX ORDER — TRANEXAMIC ACID 10 MG/ML
1000 INJECTION, SOLUTION INTRAVENOUS ONCE
Status: COMPLETED | OUTPATIENT
Start: 2025-07-03 | End: 2025-07-03

## 2025-07-03 RX ORDER — ONDANSETRON 2 MG/ML
4 INJECTION INTRAMUSCULAR; INTRAVENOUS ONCE AS NEEDED
Status: DISCONTINUED | OUTPATIENT
Start: 2025-07-03 | End: 2025-07-03 | Stop reason: HOSPADM

## 2025-07-03 RX ORDER — SENNOSIDES 8.6 MG
650 CAPSULE ORAL EVERY 8 HOURS PRN
Qty: 30 TABLET | Refills: 0 | Status: SHIPPED | OUTPATIENT
Start: 2025-07-03 | End: 2025-08-02

## 2025-07-03 RX ORDER — OXYCODONE HYDROCHLORIDE 5 MG/1
5 TABLET ORAL EVERY 4 HOURS PRN
Status: DISCONTINUED | OUTPATIENT
Start: 2025-07-03 | End: 2025-07-04 | Stop reason: HOSPADM

## 2025-07-03 RX ORDER — SENNOSIDES 8.6 MG
1 TABLET ORAL DAILY
Status: DISCONTINUED | OUTPATIENT
Start: 2025-07-04 | End: 2025-07-04 | Stop reason: HOSPADM

## 2025-07-03 RX ORDER — CEFAZOLIN SODIUM 1 G/3ML
INJECTION, POWDER, FOR SOLUTION INTRAMUSCULAR; INTRAVENOUS AS NEEDED
Status: DISCONTINUED | OUTPATIENT
Start: 2025-07-03 | End: 2025-07-03

## 2025-07-03 RX ORDER — CHLORHEXIDINE GLUCONATE ORAL RINSE 1.2 MG/ML
15 SOLUTION DENTAL ONCE
Status: COMPLETED | OUTPATIENT
Start: 2025-07-03 | End: 2025-07-03

## 2025-07-03 RX ORDER — ONDANSETRON 2 MG/ML
4 INJECTION INTRAMUSCULAR; INTRAVENOUS EVERY 6 HOURS PRN
Status: DISCONTINUED | OUTPATIENT
Start: 2025-07-03 | End: 2025-07-04 | Stop reason: HOSPADM

## 2025-07-03 RX ORDER — CEFAZOLIN SODIUM 1 G/50ML
1000 SOLUTION INTRAVENOUS EVERY 8 HOURS
Status: COMPLETED | OUTPATIENT
Start: 2025-07-03 | End: 2025-07-04

## 2025-07-03 RX ORDER — METHYLPREDNISOLONE 4 MG/1
TABLET ORAL
Qty: 1 EACH | Refills: 0 | Status: SHIPPED | OUTPATIENT
Start: 2025-07-03

## 2025-07-03 RX ORDER — FOLIC ACID 1 MG/1
1 TABLET ORAL DAILY
Status: DISCONTINUED | OUTPATIENT
Start: 2025-07-04 | End: 2025-07-04 | Stop reason: HOSPADM

## 2025-07-03 RX ADMIN — BUPIVACAINE HYDROCHLORIDE 10 ML: 5 INJECTION, SOLUTION EPIDURAL; INTRACAUDAL; PERINEURAL at 13:54

## 2025-07-03 RX ADMIN — PHENYLEPHRINE HYDROCHLORIDE 20 MCG/MIN: 10 INJECTION INTRAVENOUS at 14:17

## 2025-07-03 RX ADMIN — PROPOFOL 120 MCG/KG/MIN: 10 INJECTION, EMULSION INTRAVENOUS at 14:17

## 2025-07-03 RX ADMIN — TRANEXAMIC ACID 1000 MG: 10 INJECTION, SOLUTION INTRAVENOUS at 14:05

## 2025-07-03 RX ADMIN — TRAZODONE HYDROCHLORIDE 100 MG: 100 TABLET ORAL at 21:43

## 2025-07-03 RX ADMIN — ONDANSETRON 4 MG: 2 INJECTION INTRAMUSCULAR; INTRAVENOUS at 14:02

## 2025-07-03 RX ADMIN — OXYCODONE HYDROCHLORIDE AND ACETAMINOPHEN 500 MG: 500 TABLET ORAL at 19:59

## 2025-07-03 RX ADMIN — FENTANYL CITRATE 50 MCG: 50 INJECTION INTRAMUSCULAR; INTRAVENOUS at 14:30

## 2025-07-03 RX ADMIN — GLYCOPYRROLATE 0.1 MCG: 0.2 INJECTION, SOLUTION INTRAMUSCULAR; INTRAVENOUS at 14:02

## 2025-07-03 RX ADMIN — SODIUM CHLORIDE, SODIUM LACTATE, POTASSIUM CHLORIDE, AND CALCIUM CHLORIDE 75 ML/HR: .6; .31; .03; .02 INJECTION, SOLUTION INTRAVENOUS at 20:02

## 2025-07-03 RX ADMIN — METHOCARBAMOL TABLETS 750 MG: 750 TABLET, COATED ORAL at 19:59

## 2025-07-03 RX ADMIN — DOCUSATE SODIUM 100 MG: 100 CAPSULE, LIQUID FILLED ORAL at 19:59

## 2025-07-03 RX ADMIN — BUPIVACAINE 10 ML: 13.3 INJECTION, SUSPENSION, LIPOSOMAL INFILTRATION at 13:54

## 2025-07-03 RX ADMIN — SODIUM CHLORIDE, SODIUM LACTATE, POTASSIUM CHLORIDE, AND CALCIUM CHLORIDE: .6; .31; .03; .02 INJECTION, SOLUTION INTRAVENOUS at 16:20

## 2025-07-03 RX ADMIN — OXYCODONE 5 MG: 5 TABLET ORAL at 18:06

## 2025-07-03 RX ADMIN — ALPRAZOLAM 0.5 MG: 0.5 TABLET ORAL at 21:43

## 2025-07-03 RX ADMIN — CHLORHEXIDINE GLUCONATE 0.12% ORAL RINSE 15 ML: 1.2 LIQUID ORAL at 12:42

## 2025-07-03 RX ADMIN — MEPIVACAINE HYDROCHLORIDE 3 ML: 15 INJECTION, SOLUTION EPIDURAL; INFILTRATION at 14:13

## 2025-07-03 RX ADMIN — MIDAZOLAM 2 MG: 1 INJECTION INTRAMUSCULAR; INTRAVENOUS at 13:49

## 2025-07-03 RX ADMIN — SODIUM CHLORIDE, SODIUM LACTATE, POTASSIUM CHLORIDE, AND CALCIUM CHLORIDE: .6; .31; .03; .02 INJECTION, SOLUTION INTRAVENOUS at 13:48

## 2025-07-03 RX ADMIN — HYDROMORPHONE HYDROCHLORIDE 0.5 MG: 1 INJECTION, SOLUTION INTRAMUSCULAR; INTRAVENOUS; SUBCUTANEOUS at 19:58

## 2025-07-03 RX ADMIN — GABAPENTIN 100 MG: 100 CAPSULE ORAL at 21:43

## 2025-07-03 RX ADMIN — ACETAMINOPHEN 975 MG: 325 TABLET, FILM COATED ORAL at 12:37

## 2025-07-03 RX ADMIN — CEFAZOLIN SODIUM 1000 MG: 1 SOLUTION INTRAVENOUS at 21:43

## 2025-07-03 RX ADMIN — CEFAZOLIN 2000 MG: 1 INJECTION, POWDER, FOR SOLUTION INTRAMUSCULAR; INTRAVENOUS at 14:10

## 2025-07-03 RX ADMIN — HYDROMORPHONE HYDROCHLORIDE 0.5 MG: 1 INJECTION, SOLUTION INTRAMUSCULAR; INTRAVENOUS; SUBCUTANEOUS at 22:35

## 2025-07-03 RX ADMIN — FENTANYL CITRATE 50 MCG: 50 INJECTION INTRAMUSCULAR; INTRAVENOUS at 13:49

## 2025-07-03 NOTE — PLAN OF CARE
Problem: MUSCULOSKELETAL - ADULT  Goal: Maintain or return mobility to safest level of function  Description: INTERVENTIONS:  - Assess patient's ability to carry out ADLs; assess patient's baseline for ADL function and identify physical deficits which impact ability to perform ADLs (bathing, care of mouth/teeth, toileting, grooming, dressing, etc.)  - Assess/evaluate cause of self-care deficits   - Assess range of motion  - Assess patient's mobility  - Assess patient's need for assistive devices and provide as appropriate  - Encourage maximum independence but intervene and supervise when necessary  - Involve family in performance of ADLs  - Assess for home care needs following discharge   - Consider OT consult to assist with ADL evaluation and planning for discharge  - Provide patient education as appropriate  Outcome: Progressing     Problem: MUSCULOSKELETAL - ADULT  Goal: Maintain or return mobility to safest level of function  Description: INTERVENTIONS:  - Assess patient's ability to carry out ADLs; assess patient's baseline for ADL function and identify physical deficits which impact ability to perform ADLs (bathing, care of mouth/teeth, toileting, grooming, dressing, etc.)  - Assess/evaluate cause of self-care deficits   - Assess range of motion  - Assess patient's mobility  - Assess patient's need for assistive devices and provide as appropriate  - Encourage maximum independence but intervene and supervise when necessary  - Involve family in performance of ADLs  - Assess for home care needs following discharge   - Consider OT consult to assist with ADL evaluation and planning for discharge  - Provide patient education as appropriate  Outcome: Progressing  Goal: Maintain proper alignment of affected body part  Description: INTERVENTIONS:  - Support, maintain and protect limb and body alignment  - Provide patient/ family with appropriate education  Outcome: Progressing

## 2025-07-03 NOTE — ANESTHESIA POSTPROCEDURE EVALUATION
Post-Op Assessment Note    CV Status:  Stable    Pain management: adequate       Mental Status:  Alert and awake   Hydration Status:  Euvolemic   PONV Controlled:  Controlled   Airway Patency:  Patent     Post Op Vitals Reviewed: Yes    No anethesia notable event occurred.    Staff: Anesthesiologist         Last Filed PACU Vitals:  Vitals Value Taken Time   Temp 96.9 °F (36.1 °C) 07/03/25 16:50   Pulse 54 07/03/25 17:01   /75 07/03/25 17:00   Resp 18 07/03/25 17:01   SpO2 99 % 07/03/25 17:01   Vitals shown include unfiled device data.    Modified Juan Pablo:     Vitals Value Taken Time   Activity 1 07/03/25 16:50   Respiration 2 07/03/25 16:50   Circulation 2 07/03/25 16:50   Consciousness 2 07/03/25 16:50   Oxygen Saturation 2 07/03/25 16:50     Modified Juan Pablo Score: 9

## 2025-07-03 NOTE — ANESTHESIA PROCEDURE NOTES
Peripheral Block    Patient location during procedure: holding area  Start time: 7/3/2025 1:54 PM  Reason for block: at surgeon's request and post-op pain management  Staffing  Performed by: Valarie Dan MD  Authorized by: Valarie Dan MD    Preanesthetic Checklist  Completed: patient identified, IV checked, site marked, risks and benefits discussed, surgical consent, monitors and equipment checked, pre-op evaluation and timeout performed  Peripheral Block  Patient position: supine  Prep: ChloraPrep  Patient monitoring: frequent blood pressure checks, continuous pulse oximetry and heart rate  Block type: Adductor Canal  Laterality: left  Injection technique: single-shot  Procedures: ultrasound guided, Ultrasound guidance required for the procedure to increase accuracy and safety of medication placement and decrease risk of complications.  Ultrasound permanent image saved    Needle  Needle type: Stimuplex   Needle gauge: 20 G  Needle length: 4 in  Needle localization: anatomical landmarks and ultrasound guidance  Needle insertion depth: 2 cm  Assessment  Injection assessment: incremental injection, frequent aspiration, injected with ease, negative aspiration, negative for heart rate change, no paresthesia on injection, no symptoms of intraneural/intravenous injection and needle tip visualized at all times  Paresthesia pain: none  Post-procedure:  site cleaned  patient tolerated the procedure well with no immediate complications

## 2025-07-03 NOTE — Clinical Note
Pt is a 55 y.o. female seen for OT evaluation at [facility], admitted 7/3/2025 w/ S/P total knee arthroplasty, left.  Comorbidities affecting pt's functional performance at time of assessment include: generalized anxiety disorder, depression, hx of ACL sx, iron deficiency, complex regional pain syndrome, etc (see chart).  Prior to admission, pt was living with spouse in house with steps to manage.  Pt was I w/  ADLS and IADLS, (+) drove, & required no DME/AD PTA. Upon evaluation, pt appears to be performing below baseline functional status. Pt currently requires sup for bed mobility, sup for functional mobility/transfers, sup for UB ADLs and sup-min A for LB ADLS 2* the following deficits impacting occupational performance: decreased strength, decreased balance, decreased tolerance, and increased pain. Full objective findings from OT assessment regarding body systems outlined above. Personal factors affecting pt at time of IE include:steps to enter environment, difficulty performing ADLS, difficulty performing IADLS , and decreased functional mobility. These impairments, as well as risk for falls  limit pt's ability to safely engage in all baseline areas of occupation and mobility. Pt educated this encounter re: ADL strategies and transfers This evaluation required an extensive review of medical and/or therapy records and additional review of physical, cognitive and psychosocial history related to functional performance. Based upon functional performance deficits and assessments, this evaluation has been identified as a high complexity evaluation.  At this time, OT recommendations at time of discharge are home with no OT needs.      The patient's raw score on the AM-PAC Daily Activity inpatient short form is 23, standardized score is 51.12, greater than 39.4. Patients at this level are likely to benefit from DC to home. However please refer to therapist recommendation for discharge planning given other  factors that may influence destination.

## 2025-07-03 NOTE — ANESTHESIA PREPROCEDURE EVALUATION
Procedure:  ARTHROPLASTY KNEE TOTAL W ROBOT (Left: Knee)    Relevant Problems   HEMATOLOGY   (+) Anemia   (+) Iron deficiency anemia secondary to inadequate dietary iron intake      MUSCULOSKELETAL   (+) Primary osteoarthritis of left knee      NEURO/PSYCH   (+) Complex regional pain syndrome i of left lower limb   (+) Depression   (+) Generalized anxiety disorder      Spinal Cord Stimulator in-situ, placed at [facility] in 2024. Per operative note, leads inserted at T10/T11 and per CXR leads terminate at T8/T9. Spinal Cord Stimulator turned off by patient.     History: Scoliotic spinal cord stimulator position.    AP and lateral films of the thoracal Lasix spine were obtained. Spinal cord  stimulator is been inserted with its tip at the level of T8/T9.    Exam End: 12/05/24 10:02        Latest Reference Range & Units 06/17/25 10:25   WBC 4.31 - 10.16 Thousand/uL 3.79 (L)   RBC 3.81 - 5.12 Million/uL 4.11   Hemoglobin 11.5 - 15.4 g/dL 13.7   Hematocrit 34.8 - 46.1 % 42.5   MCV 82 - 98 fL 103 (H)   MCH 26.8 - 34.3 pg 33.3   MCHC 31.4 - 37.4 g/dL 32.2   RDW 11.6 - 15.1 % 12.1   Platelet Count 149 - 390 Thousands/uL 204   MPV 8.9 - 12.7 fL 9.6   (L): Data is abnormally low  (H): Data is abnormally high  Physical Exam    Airway     Mallampati score: II  TM Distance: >3 FB  Neck ROM: full      Cardiovascular      Dental   No notable dental hx     Pulmonary      Neurological    She appears awake, alert and oriented x3.      Other Findings  post-pubertal.      Anesthesia Plan  ASA Score- 3     Anesthesia Type- spinal with ASA Monitors.         Additional Monitors:     Airway Plan: natural airway.           Plan Factors-Exercise tolerance (METS): >4 METS.    Chart reviewed.   Existing labs reviewed. Patient summary reviewed.    Patient is not a current smoker.  Patient did not smoke on day of surgery.            Induction- intravenous.    Postoperative Plan- Plan for postoperative opioid use.   Monitoring Plan - Monitoring plan -  standard ASA monitoring  Post Operative Pain Plan - plan for postoperative opioid use, peripheral nerve block and multimodal analgesia    Perioperative Resuscitation Plan - Level 1 - Full Code.       Informed Consent- Anesthetic plan and risks discussed with patient.  I personally reviewed this patient with the CRNA. Discussed and agreed on the Anesthesia Plan with the CRNA..      NPO Status:  No vitals data found for the desired time range.

## 2025-07-03 NOTE — ANESTHESIA POSTPROCEDURE EVALUATION
Post-Op Assessment Note    CV Status:  Stable  Pain Score: 0    Pain management: adequate    Multimodal analgesia used between 6 hours prior to anesthesia start to PACU discharge    Mental Status:  Alert and awake   Hydration Status:  Euvolemic   PONV Controlled:  Controlled   Airway Patency:  Patent  Two or more mitigation strategies used for obstructive sleep apnea   Post Op Vitals Reviewed: Yes    No anethesia notable event occurred.    Staff: CRNA           Last Filed PACU Vitals:  Vitals Value Taken Time   Temp 97.3    Pulse 66 07/03/25 16:38   /72    Resp 20 07/03/25 16:38   SpO2 97 % 07/03/25 16:38   Vitals shown include unfiled device data.

## 2025-07-03 NOTE — INTERVAL H&P NOTE
H&P reviewed. After examining the patient I find no changes in the patients condition since the H&P had been written.    Vitals:    07/03/25 1231   BP: 137/65   Pulse: 68   Resp: 16   Temp: 97.7 °F (36.5 °C)   SpO2: 98%   Patient seen and examined  General: No apparent distress  CV: S1-S2  Abdomen: Soft  Chest: No audible wheezing  Left lower extremity: No abrasions or open wounds; mild effusion; neurologically vascular intact distally  To the operating room for left total knee arthroplasty  Pros and cons of operative and nonoperative intervention discussed with patient  We will follow-up postoperatively

## 2025-07-03 NOTE — OCCUPATIONAL THERAPY NOTE
Occupational Therapy Evaluation & Treat     Patient Name: Laura Rhoades  Today's Date: 7/3/2025  Problem List  Principal Problem:    S/P total knee arthroplasty, left    Past Medical History  Past Medical History[1]  Past Surgical History  Past Surgical History[2]        07/03/25 5979   OT Last Visit   OT Visit Date 07/03/25   Note Type   Note type Evaluation  (& Treat)   Additional Comments Pt is a 56 y/o F s/p L TKA POD#0.   Pain Assessment   Pain Assessment Tool 0-10   Pain Score 7   Pain Location/Orientation Orientation: Right;Location: Knee   Pain Onset/Description Descriptor: Burning   Patient's Stated Pain Goal 2   Hospital Pain Intervention(s) Repositioned;Rest   Restrictions/Precautions   Weight Bearing Precautions Per Order Yes   LLE Weight Bearing Per Order WBAT   Other Precautions Fall Risk;Pain   Home Living   Type of Home House   Home Layout Two level;Stairs to enter with rails   Bathroom Shower/Tub Walk-in shower   Bathroom Toilet Raised   Bathroom Equipment Toilet raiser;Grab bars in shower   Bathroom Accessibility Accessible   Home Equipment Walker  (Pt was not using at baseline.)   Prior Function   Level of [place] Independent with ADLs;Independent with functional mobility;Independent with IADLS   Lives With Spouse   IADLs Independent with driving;Independent with meal prep;Independent with medication management   Subjective   Subjective Pt received in supine. Pt agreeable to session.   ADL   Eating Assistance 7  Independent   Grooming Assistance 7  Independent   UB Bathing Assistance 5  Supervision/Setup   LB Bathing Assistance 5  Supervision/Setup   UB Dressing Assistance 5  Supervision/Setup   LB Dressing Assistance 4  Minimal Assistance   Toileting Assistance  5  Supervision/Setup   Bed Mobility   Supine to Sit 5  Supervision   Additional items Assist x 1;Verbal cues;Increased time required   Sit to Supine 5  Supervision   Additional items Assist x 1;Verbal cues;Increased time required    Transfers   Sit to Stand 5  Supervision   Additional items Assist x 1;Verbal cues;Increased time required   Stand to Sit 5  Supervision   Additional items Assist x 1;Verbal cues;Increased time required   Functional Mobility   Functional Mobility 5  Supervision   Additional Comments RW   Balance   Static Sitting Good   Dynamic Sitting Fair +   Static Standing Fair +   Dynamic Standing Fair +   Activity Tolerance   Activity Tolerance Patient tolerated treatment well   Medical Staff Made Aware PT Jos   Nurse Made Aware JOESPH CISNEROS Assessment   RUE Assessment WFL   LUE Assessment   LUE Assessment WFL   Cognition   Overall Cognitive Status WFL   Arousal/Participation Alert   Attention Within functional limits   Orientation Level Oriented X4   Memory Within functional limits   Following Commands Follows all commands and directions without difficulty   Assessment   Assessment Pt is a 55 y.o. female seen for OT evaluation at [facility], admitted 7/3/2025 w/ S/P total knee arthroplasty, left.  Comorbidities affecting pt's functional performance at time of assessment include: generalized anxiety disorder, depression, hx of ACL sx, iron deficiency, complex regional pain syndrome, etc (see chart).  Prior to admission, pt was living with spouse in house with steps to manage.  Pt was I w/  ADLS and IADLS, (+) drove, & required no DME/AD PTA. Upon evaluation, pt appears to be performing below baseline functional status. Pt currently requires sup for bed mobility, sup for functional mobility/transfers, sup for UB ADLs and sup-min A for LB ADLS 2* the following deficits impacting occupational performance: decreased strength, decreased balance, decreased tolerance, and increased pain. Full objective findings from OT assessment regarding body systems outlined above. Personal factors affecting pt at time of IE include:steps to enter environment, difficulty performing ADLS, difficulty performing IADLS , and decreased  functional mobility . These impairments, as well as risk for falls  limit pt's ability to safely engage in all baseline areas of occupation and mobility. Pt educated this encounter re: ADL strategies and transfers This evaluation required an extensive review of medical and/or therapy records and additional review of physical, cognitive and psychosocial history related to functional performance. Based upon functional performance deficits and assessments, this evaluation has been identified as a high complexity evaluation.  At this time, OT recommendations at time of discharge are home with no OT needs.   Goals   Patient Goals Pt wishes to get home   Plan   OT Frequency Eval only   Discharge Recommendation   Rehab Resource Intensity Level, OT No post-acute rehabilitation needs   Additional Comments  The patient's raw score on the AM-PAC Daily Activity inpatient short form is 23, standardized score is 51.12, greater than 39.4. Patients at this level are likely to benefit from DC to home. However please refer to therapist recommendation for discharge planning given other factors that may influence destination.   -PAC Daily Activity Inpatient   Lower Body Dressing 3   Bathing 4   Toileting 4   Upper Body Dressing 4   Grooming 4   Eating 4   Daily Activity Raw Score 23   Daily Activity Standardized Score (Calc for Raw Score >=11) 51.12   -[facility] Applied Cognition Inpatient   Following a Speech/Presentation 4   Understanding Ordinary Conversation 4   Taking Medications 4   Remembering Where Things Are Placed or Put Away 4   Remembering List of 4-5 Errands 4   Taking Care of Complicated Tasks 4   Applied Cognition Raw Score 24   Applied Cognition Standardized Score 62.21   Additional Treatment Session   Start Time 1849   End Time 1859   Treatment Assessment Pt performed functional mobility into bathroom with supervision with RW. Performed toilet transfer with VC for hand placement and body mechanics. Pt then performed  toileting with supervision followed by hand washing at sink with supervision with RW in place. Performed additional functional mobility x 100 feet with RW with supervision. Perfomed car transfer simulation with supervision with VC for hand placement. Pt then returned to bedside with supervision. Pt educated on LB ADL dressing strategies. Donned underwear/pants with min A. Pt appeared limited by pain. Spouse at bedside able to assist as needed. Reviewed use of appropriate DME for safe ADL mgmt. Pt remained seated EOB. NAD. All needs met. Following tx session, pt demonstrates adequate functional independence and safety for POD#0 D/C.              Shelby Olson, OTR/L        [1]   Past Medical History:  Diagnosis Date    Anxiety     Arthritis     Complex regional pain syndrome i of left lower limb     Depression     Gastroparesis     PONV (postoperative nausea and vomiting)     Raynaud's phenomenon without gangrene    [2]   Past Surgical History:  Procedure Laterality Date    APPENDECTOMY      AUGMENTATION BREAST Bilateral     AUGMENTATION MAMMAPLASTY  2014    COLONOSCOPY      COSMETIC SURGERY  Botox / Filler    May 2024 / 2022    EGD AND COLONOSCOPY      HYSTERECTOMY      KNEE SURGERY Left 01/2019    x4    SPINAL CORD STIMULATOR IMPLANT  12/24

## 2025-07-03 NOTE — PLAN OF CARE
Problem: PHYSICAL THERAPY ADULT  Goal: Performs mobility at highest level of function for planned discharge setting.  See evaluation for individualized goals.  Description: Treatment/Interventions: Functional transfer training, LE strengthening/ROM, Elevations, Therapeutic exercise, Patient/family training, Equipment eval/education, Bed mobility, Gait training          See flowsheet documentation for full assessment, interventions and recommendations.  Outcome: Progressing  Note: Prognosis: Good  Problem List: Decreased strength, Decreased range of motion, Impaired balance, Decreased mobility, Pain, Orthopedic restrictions  Assessment: Orders for PT eval and treat received. Pt's PMHx: anxiety, depression, spinal cord stimulator, scoliosis, complex regional pain syndrome of LLE, multiple left ACL reconstruction sx, left knee nerve ablation. Pt exhibits physical deficits noted in problem list above.  Deficits listed contribute to functional limitations that are significant from the patient PLOF and include: difficulty with bed mobility, impaired standing balance, unsafe/inefficient ambulation, fall risk, and unable to safely manage stairs/steps/ramp    Additionally, patient is limited by restrictions/precautions/DME: LLE WBAT.    Additional considerations affecting pt's discharge planning: Significant amount of stairs to manage in order to access living quarters    During today's session, formal PT evaluation performed.  Initiated session with supine HEP before transferring to sitting and standing with walker.  Vitals and symptoms closely monitored.  Initiated standing HEP and educated on walker utilization.  Progressed ambulation and stair training as able.  Pt displaying appropriate ability to manage basic transfers, ambulation, and stairs in order to return home.  Recommending following up with OP PT.    The AM-PAC Mobility Score was used to assist in determining pt safety w/ mobility/self care & appropriate d/c  recommendations, see above for scores. Patient's clinical presentation is evolving due to significant acute change in functional independence, uncontrolled pain, significant need for care assistance compared to baseline, recent surgery/procedure, and ongoing medical management needs.     From a PT/mobility standpoint given the above findings, DC recommendation is level: III (Minimum Rehab Resource Intensity)    Considering the patient's PLOF, co-morbidities, acute functional limitations, functional outcome measures, and/or goal to progress functional independence; this patient would benefit from skilled Physical Therapy intervention in the acute care setting.  Barriers to Discharge: Inaccessible home environment     Rehab Resource Intensity Level, PT: III (Minimum Resource Intensity)    See flowsheet documentation for full assessment.

## 2025-07-03 NOTE — PHYSICAL THERAPY NOTE
PHYSICAL THERAPY Evaluation and Treatment  DATE: 07/03/25  TIME: 1811-1844    NAME:  Laura Rhoades  AGE:   55 y.o.  Mrn:   [id number]  Length Of Stay: 0    ADMIT DX:  Primary osteoarthritis of left knee [M17.12]    Past Medical History[1]  Past Surgical History[2]     07/03/25 1811   PT Last Visit   PT Visit Date 07/03/25   Note Type   Note type Evaluation   Additional Comments 54 y/o presents for elective left TKA.   Pain Assessment   Pain Assessment Tool 0-10   Pain Score 4   Pain Location/Orientation Orientation: Left;Location: Knee   Pain Onset/Description Descriptor: [facility]   Hospital Pain Intervention(s) Repositioned;Ambulation/increased activity;Rest   Restrictions/Precautions   Weight Bearing Precautions Per Order Yes   LLE Weight Bearing Per Order WBAT   Other Precautions Pain;Fall Risk;WBS   Home Living   Additional Comments Pt lives with  in 2-level house, 2 MARLYS. DME: RW   Prior Function   Comments Prior to admission: independent with ADL, IADLs, drive (+). ambulates without AD   General   Additional Pertinent History vital measures: supine - 120/58. sitting - 127/60. standing - 125/67.   Family/Caregiver Present Yes   Cognition   Overall Cognitive Status WFL   Arousal/Participation Alert   Orientation Level Oriented X4   Subjective   Subjective Pt reports significant pain but tolerable.  No significant nausea or dizziness.   RUE Assessment   RUE Assessment WFL   LUE Assessment   LUE Assessment WFL   RLE Assessment   RLE Assessment WFL   LLE Assessment   LLE Assessment   (knee AROM ~5-50 deg)   Coordination   Movements are Fluid and Coordinated 1   Sensation WFL   Bed Mobility   Supine to Sit 5  Supervision   Additional items Assist x 1;HOB elevated;Bedrails;Increased time required;Verbal cues   Transfers   Sit to Stand 5  Supervision   Additional items Assist x 1;Increased time required;Verbal cues  (RW)   Stand to Sit 5  Supervision   Additional items Assist x 1;Increased time required;Verbal  cues   Stand pivot 5  Supervision   Additional items Assist x 1;Increased time required;Verbal cues  (RW)   Ambulation/Elevation   Gait Assistance 5  Supervision   Additional items Assist x 1;Verbal cues;Tactile cues   Assistive Device Rolling walker   Distance 40'+80'   Stair Management Assistance 5  Supervision   Additional items Assist x 1;Verbal cues;Tactile cues   Stair Management Technique One rail L;Two rails;With walker   Number of Stairs 8   Ambulation/Elevation Additional Comments Pt initiated step-to gait pattern with RW before progressing quickly to reicprocal gait with equal step lengths.  Pt ascend/descend 4 stairs moving laterally with both hands on one rail, 2 stair moving forward with 2 rails, and 2 stairs moving forward with only RW.   Balance   Static Sitting Good   Dynamic Sitting Good   Static Standing Fair +  (RW)   Dynamic Standing Fair +  (RW)   Ambulatory Fair +  (RW)   Endurance Deficit   Endurance Deficit No   Activity Tolerance   Activity Tolerance Patient limited by pain   Medical Staff Made Aware collaborated with OT   Assessment   Prognosis Good   Problem List Decreased strength;Decreased range of motion;Impaired balance;Decreased mobility;Pain;Orthopedic restrictions   Assessment Orders for PT eval and treat received. Pt's PMHx: anxiety, depression, spinal cord stimulator, scoliosis, complex regional pain syndrome of LLE, multiple left ACL reconstruction sx, left knee nerve ablation. Pt exhibits physical deficits noted in problem list above.  Deficits listed contribute to functional limitations that are significant from the patient PLOF and include: difficulty with bed mobility, impaired standing balance, unsafe/inefficient ambulation, fall risk, and unable to safely manage stairs/steps/ramp    Additionally, patient is limited by restrictions/precautions/DME: LLE WBAT.    Additional considerations affecting pt's discharge planning: Significant amount of stairs to manage in order to  access living quarters    During today's session, formal PT evaluation performed.  Initiated session with supine HEP before transferring to sitting and standing with walker.  Vitals and symptoms closely monitored.  Initiated standing HEP and educated on walker utilization.  Progressed ambulation and stair training as able.  Pt displaying appropriate ability to manage basic transfers, ambulation, and stairs in order to return home.  Recommending following up with OP PT.    The AM-PAC Mobility Score was used to assist in determining pt safety w/ mobility/self care & appropriate d/c recommendations, see above for scores. Patient's clinical presentation is evolving due to significant acute change in functional independence, uncontrolled pain, significant need for care assistance compared to baseline, recent surgery/procedure, and ongoing medical management needs.     From a PT/mobility standpoint given the above findings, DC recommendation is level: III (Minimum Rehab Resource Intensity)    Considering the patient's PLOF, co-morbidities, acute functional limitations, functional outcome measures, and/or goal to progress functional independence; this patient would benefit from skilled Physical Therapy intervention in the acute care setting.   Barriers to Discharge Inaccessible home environment   Goals   Patient Goals return to PLOF   STG Expiration Date 07/13/25   Short Term Goal #1 1: Pt will perform supine<>sit transfer on flat bed, mod I.  2: Pt will perform stand pivot transfer with RW, mod I.  3: Pt will ambulate 150' with reciprocal gait and appropriate pace using RW, mod I. 4: Pt will perform written HEP, mod I. 5: Pt will ascend/descend a curb step and/or stairs require to mobilize around the the patient's home with RW/rails, Sup A.   Plan   Treatment/Interventions Functional transfer training;LE strengthening/ROM;Elevations;Therapeutic exercise;Patient/family training;Equipment eval/education;Bed mobility;Gait  training   PT Frequency Twice a day   Discharge Recommendation   Rehab Resource Intensity Level, PT III (Minimum Resource Intensity)   AM-PAC Basic Mobility Inpatient   Turning in Flat Bed Without Bedrails 3   Lying on Back to Sitting on Edge of Flat Bed Without Bedrails 3   Moving Bed to Chair 3   Standing Up From Chair Using Arms 3   Walk in Room 3   Climb 3-5 Stairs With Railing 3   Basic Mobility Inpatient Raw Score 18   Basic Mobility Standardized Score 41.05   [facility] Highest Level Of Mobility   -HL Goal 6: Walk 10 steps or more   -HLM Achieved 7: Walk 25 feet or more   Additional Treatment Session   Start Time 1819   End Time 1844   Treatment Assessment Discussed at length about pain management strategies, postural corrections, proper use of RW, gait/stair training corrections, importance of gaining AROM, benefit of frequent tolerable physical activity/exercise, and fall risk precautions.  Provided caregiver training and tips as appropriate and needed. Pt responded well to HEP with improved ROM, pain, and strength.  Pt progressing gait and stair training appropriately.   Exercises   Quad Sets Supine;10 reps;AROM;Bilateral  (5 sec)   Heelslides Supine;15 reps;AAROM;Left  (2 set)   Ankle Pumps Standing;10 reps;AROM;Bilateral  (RW)   Marching Standing;10 reps;AROM;Right;Left   End of Consult   Patient Position at End of Consult Seated edge of bed;All needs within reach   The patient's AM-PAC Basic Mobility Inpatient Short Form Raw Score is 18. A Raw score of greater than or equal to 16 suggests the patient may benefit from discharge to home. Please also refer to the recommendation of the Physical Therapist for safe discharge planning.    Sukhi House, PT, DPT  [place] Licensure #[id number]         [1]   Past Medical History:  Diagnosis Date    Anxiety     Arthritis     Complex regional pain syndrome i of left lower limb     Depression     Gastroparesis     PONV (postoperative nausea and vomiting)      Raynaud's phenomenon without gangrene    [2]   Past Surgical History:  Procedure Laterality Date    APPENDECTOMY      AUGMENTATION BREAST Bilateral     AUGMENTATION MAMMAPLASTY  2014    COLONOSCOPY      COSMETIC SURGERY  Botox / Filler    May 2024 / 2022    EGD AND COLONOSCOPY      HYSTERECTOMY      KNEE SURGERY Left 01/2019    x4    SPINAL CORD STIMULATOR IMPLANT  12/24

## 2025-07-03 NOTE — ANESTHESIA PROCEDURE NOTES
Spinal Block    Patient location during procedure: OR  Start time: 7/3/2025 2:13 PM  Reason for block: procedure for pain and at surgeon's request  Staffing  Performed by: Valarie Dan MD  Authorized by: Valarie Dan MD    Preanesthetic Checklist  Completed: patient identified, IV checked, risks and benefits discussed, surgical consent, monitors and equipment checked, pre-op evaluation and timeout performed  Spinal Block  Patient position: sitting  Prep: ChloraPrep and site prepped and draped  Patient monitoring: frequent blood pressure checks, continuous pulse ox and heart rate  Approach: midline  Location: L3-4  Needle  Needle type: Pencan   Needle gauge: 25 G  Needle length: 3.5 in  Assessment  Sensory level: T4  Injection Assessment:  negative aspiration for heme, no paresthesia on injection and positive aspiration for clear CSF.  Post-procedure:  site cleaned

## 2025-07-03 NOTE — DISCHARGE INSTR - AVS FIRST PAGE
Discharge Instructions - Orthopedics  Laura Rhoades 55 y.o. female MRN: [id number]  Unit/Bed#:     Weight Bearing Status:                                           Weight Bearing as tolerated to the bilateral lower extremities.    DVT prophylaxis:  Complete course of Aspirin 81 mg twice daily x 35 days from date of surgery as directed    Pain:  Start oxycodone 5 mg every 6 hrs after last dose taken after surgery for 1 day  Transition to oxycodone 5 mg every 6 hours as needed    Showering Instructions:   Do not shower until 5 days from date of surgery  Do not soak your incision(Tubs, Jacuzzi's, pools, ext)    Dressing Instructions:   May remove dressing 5 days from date of surgery OR may leave dressing in place until follow up office visit 2 weeks from surgery date  Keep dressing/incision clean and intact until follow up appointment.  Do not apply any creams or ointments/lotions to your incisions including antibiotic ointment, peroxide    Driving Instructions:  No driving until cleared by Orthopaedic Surgery.    PT/OT:  Continue PT/OT on outpatient basis as directed  Continue motion and strengthening exercises while at home    Appt Instructions:   If you do not have your appointment, please call the clinic at [phone]  Otherwise followup as scheduled    Contact the office sooner if you experience any increased numbness/tingling in the extremities.

## 2025-07-03 NOTE — OP NOTE
Brief Op Note  Laura Rhoades  MRN: [id number]      Unit/Bed#: PACU 05    PreOp Dx: left knee DJD      Postop Dx: left knee DJD; intraoperative tibia fracture      Procedure: left total knee arthroplasty with robotic assistance; operative fixation of nondisplaced tibia fracture      Surgeon: Zaira Santana MD      Assistants:Ry Bruce PA-C     No Qualified Resident Available for this Case.  The physician assistant was needed for portions of this case including prepping and draping the patient as well as prepping fragmentation of the femoral, tibial, patellar components as well as fixation of the intraoperative fracture and closure of the operative sites.    Fluids:       EBL:       Drains: None      Specimens: No       Complications: Intraoperative tibial fracture      Condition: stable transferred to postanesthesia care unit      Implants: Depuy Attune   Femoral, size: 5 narrow press-fit cruciate retaining  Tibial tray: 5 fixed-bearing cemented  Polyethylene: 5 medial stabilizing   Patellar button: 38    Synthes small fragment T plate (5 hole)    55 y.o.female, who presents at this time with history of multiple surgeries including ACL reconstruction as well as revision ACL reconstruction, who presents at this time after failed conservative treatment for her left knee pain.  After physical exam and radiographic findings, patient was noted to have osteoarthritic changes which again failed conservative measures.  At this time she is presenting for total knee arthroplasty for pathology.    The patient was told of all the pros and cons of operative and nonoperative intervention. Some of the complications of operative intervention include infection, bleeding, scarring, nerve injury, vascular injury, fracture, continued pain, decreased range of motion, DVT, PE death, loss of limb, need for further surgery, not obtain an results. The patient wished. Patient did consent for operative intervention for this  pathology.    Patient was brought to the operating room. Patient was anesthetized as anesthesia team. Patient was prepped and draped in normal sterile fashion. After this was done, we did conduct a time out to make sure correct. Patient was in the room, prepped marked and draped. Implants were in the room, Rep. For the implants were present, DVT prophylaxis and antibiotics were addressed.    Midline incision was made over the anterior knee after going through skin, fatty tissue, fascia was identified. Flaps were created both medially and laterally. Medial parapatellar incision was made. Excision of Hoffa fat pad was conducted. At this time because this was a varus knee, we did release over the medial aspect including medial osteophytes. We're able to excise the fatty tissue from the anterior aspect of the distal femur. We were able to at this time, flex the knee with the patella everted.  At this time, we were able to put our pins in the proximal tibia and the distal femur so that we can allow for robot assistance with our sensors.  At this time after gauging for the type of cuts to be made, the robot arm was brought in to make our distal femoral cuts as well as anterior, posterior, anterior and posterior chamfer cuts.  After loose bone pieces were removed, we were able to at this time make our proximal tibial cut.  Trial components were placed and it was noted that we had appropriate range of motion and stability on the coronal and sagittal planes.  We were utilizing a medial stabilized construct therefore a small notch was made over the anterior aspect of the distal femur.  We were able to at this time prep for the proximal tibia.  Patellar button size was determined after the articular surface of the patella was excised. The patella button was placed on the medial aspect of the patella. Holes were drilled for our true patella button to be cemented on. We tracked the knee, and we noted that the patella was  tracking appropriately over the trochlear of the trial implants. After this was done we did drill holes in our trial femoral component. We then removed. All trial components.  There were 2 regions in the tibial plateau both anterior medially and centrally that needed to be curetted to remove the Ethibond sutures and the none Bony tissue which was likely from patient's prior surgical intervention.  At this time, the decision was made for cementing of the tibial component and press-fit for the femoral component.  Copious irrigation was utilized at the tibial plateau and patella operative sites.  The femoral component was press-fit and the tibia and patellar components were cemented.  Once all the cement had dried, irrigation was utilized once more.a true polyethylene liner was then placed.  Upon evaluation, there was an nondisplaced fracture line noted anteriorly correlating with where patient had her prior ACL tunnel.  At this time, after the tourniquet was discontinued and our parapatellar approach was reapproximated, a 5 hole small fragment T plate was placed on the medial aspect to act as a buttress for the tibial plateau region.  Appropriate plate position was confirmed with radiographic imaging.  Vicryl sutures were also utilized to reapproximate the fatty and subcu layers.  This was then reinforced with staples.  Wounds were cleaned and dried.  Acticoat, 4 x 4, ABD, Webril, and Ace bandage was placed.  Patient was then subsequently undraped, awakened as per anesthesia team, noted to be in stable condition, transferred to postanesthesia care unit.  Patient will be made weightbearing as tolerated to left lower extremity.

## 2025-07-04 VITALS
SYSTOLIC BLOOD PRESSURE: 119 MMHG | RESPIRATION RATE: 18 BRPM | OXYGEN SATURATION: 97 % | DIASTOLIC BLOOD PRESSURE: 58 MMHG | HEART RATE: 81 BPM | BODY MASS INDEX: 21.72 KG/M2 | WEIGHT: 127.21 LBS | HEIGHT: 64 IN | TEMPERATURE: 97.5 F

## 2025-07-04 LAB
ANION GAP SERPL CALCULATED.3IONS-SCNC: 4 MMOL/L (ref 4–13)
BUN SERPL-MCNC: 14 MG/DL (ref 5–25)
CALCIUM SERPL-MCNC: 8.5 MG/DL (ref 8.4–10.2)
CHLORIDE SERPL-SCNC: 103 MMOL/L (ref 96–108)
CO2 SERPL-SCNC: 31 MMOL/L (ref 21–32)
CREAT SERPL-MCNC: 0.64 MG/DL (ref 0.6–1.3)
ERYTHROCYTE [DISTWIDTH] IN BLOOD BY AUTOMATED COUNT: 12.4 % (ref 11.6–15.1)
GFR SERPL CREATININE-BSD FRML MDRD: 100 ML/MIN/1.73SQ M
GLUCOSE SERPL-MCNC: 107 MG/DL (ref 65–140)
HCT VFR BLD AUTO: 34.8 % (ref 34.8–46.1)
HGB BLD-MCNC: 11.4 G/DL (ref 11.5–15.4)
MCH RBC QN AUTO: 34.2 PG (ref 26.8–34.3)
MCHC RBC AUTO-ENTMCNC: 32.8 G/DL (ref 31.4–37.4)
MCV RBC AUTO: 105 FL (ref 82–98)
PLATELET # BLD AUTO: 197 THOUSANDS/UL (ref 149–390)
PMV BLD AUTO: 9.6 FL (ref 8.9–12.7)
POTASSIUM SERPL-SCNC: 4.3 MMOL/L (ref 3.5–5.3)
RBC # BLD AUTO: 3.33 MILLION/UL (ref 3.81–5.12)
SODIUM SERPL-SCNC: 138 MMOL/L (ref 135–147)
WBC # BLD AUTO: 11.3 THOUSAND/UL (ref 4.31–10.16)

## 2025-07-04 PROCEDURE — 85027 COMPLETE CBC AUTOMATED: CPT | Performed by: PHYSICIAN ASSISTANT

## 2025-07-04 PROCEDURE — 97530 THERAPEUTIC ACTIVITIES: CPT

## 2025-07-04 PROCEDURE — 97116 GAIT TRAINING THERAPY: CPT

## 2025-07-04 PROCEDURE — 80048 BASIC METABOLIC PNL TOTAL CA: CPT | Performed by: PHYSICIAN ASSISTANT

## 2025-07-04 PROCEDURE — 99024 POSTOP FOLLOW-UP VISIT: CPT | Performed by: PHYSICIAN ASSISTANT

## 2025-07-04 RX ADMIN — HYDROMORPHONE HYDROCHLORIDE 0.5 MG: 1 INJECTION, SOLUTION INTRAMUSCULAR; INTRAVENOUS; SUBCUTANEOUS at 13:58

## 2025-07-04 RX ADMIN — ESTRADIOL 2 MG: 1 TABLET ORAL at 09:12

## 2025-07-04 RX ADMIN — HYDROMORPHONE HYDROCHLORIDE 0.5 MG: 1 INJECTION, SOLUTION INTRAMUSCULAR; INTRAVENOUS; SUBCUTANEOUS at 09:11

## 2025-07-04 RX ADMIN — METHOCARBAMOL TABLETS 750 MG: 750 TABLET, COATED ORAL at 11:46

## 2025-07-04 RX ADMIN — OXYCODONE HYDROCHLORIDE AND ACETAMINOPHEN 500 MG: 500 TABLET ORAL at 09:12

## 2025-07-04 RX ADMIN — HYDROMORPHONE HYDROCHLORIDE 0.5 MG: 1 INJECTION, SOLUTION INTRAMUSCULAR; INTRAVENOUS; SUBCUTANEOUS at 04:48

## 2025-07-04 RX ADMIN — CEFAZOLIN SODIUM 1000 MG: 1 SOLUTION INTRAVENOUS at 06:11

## 2025-07-04 RX ADMIN — METHOCARBAMOL TABLETS 750 MG: 750 TABLET, COATED ORAL at 06:11

## 2025-07-04 RX ADMIN — ENOXAPARIN SODIUM 40 MG: 40 INJECTION SUBCUTANEOUS at 04:42

## 2025-07-04 RX ADMIN — GABAPENTIN 100 MG: 100 CAPSULE ORAL at 09:12

## 2025-07-04 RX ADMIN — HYDROMORPHONE HYDROCHLORIDE 0.5 MG: 1 INJECTION, SOLUTION INTRAMUSCULAR; INTRAVENOUS; SUBCUTANEOUS at 00:48

## 2025-07-04 RX ADMIN — DOCUSATE SODIUM 100 MG: 100 CAPSULE, LIQUID FILLED ORAL at 09:12

## 2025-07-04 RX ADMIN — SENNOSIDES 8.6 MG: 8.6 TABLET, FILM COATED ORAL at 09:12

## 2025-07-04 RX ADMIN — DULOXETINE HYDROCHLORIDE 60 MG: 60 CAPSULE, DELAYED RELEASE ORAL at 09:12

## 2025-07-04 RX ADMIN — FOLIC ACID 1 MG: 1 TABLET ORAL at 09:12

## 2025-07-04 RX ADMIN — HYDROMORPHONE HYDROCHLORIDE 0.5 MG: 1 INJECTION, SOLUTION INTRAMUSCULAR; INTRAVENOUS; SUBCUTANEOUS at 11:46

## 2025-07-04 RX ADMIN — METHOCARBAMOL TABLETS 750 MG: 750 TABLET, COATED ORAL at 00:14

## 2025-07-04 NOTE — PHYSICAL THERAPY NOTE
PHYSICAL THERAPY Treatment  DATE: 07/04/25  TIME: 0842-0913    NAME:  Laura Rhoades  AGE:   55 y.o.  Mrn:   [id number]  Length Of Stay: 0    ADMIT DX:  Primary osteoarthritis of left knee [M17.12]    Past Medical History[1]  Past Surgical History[2]     07/04/25 0842   PT Last Visit   PT Visit Date 07/04/25   Note Type   Note Type Treatment   Pain Assessment   Pain Assessment Tool 0-10   Pain Score 5   Pain Location/Orientation Orientation: Left;Location: Knee   Hospital Pain Intervention(s) Repositioned;Ambulation/increased activity;Rest   Restrictions/Precautions   Weight Bearing Precautions Per Order Yes   LLE Weight Bearing Per Order WBAT   Other Precautions Pain;Fall Risk;WBS   General   Chart Reviewed Yes   Family/Caregiver Present No   Cognition   Overall Cognitive Status WFL   Arousal/Participation Alert   Orientation Level Oriented X4   Subjective   Subjective Pt reports pain is currently well controlled and no additional adverse symptoms.   Bed Mobility   Supine to Sit 6  Modified independent   Additional items HOB elevated;Bedrails;Increased time required   Transfers   Sit to Stand 6  Modified independent   Additional items Increased time required;Verbal cues  (use of UEs to clear hips, RW)   Stand to Sit 6  Modified independent   Additional items Armrests;Increased time required;Verbal cues   Ambulation/Elevation   Gait Assistance 5  Supervision   Additional items Assist x 1;Verbal cues;Tactile cues   Assistive Device Rolling walker   Distance 200'   Ambulation/Elevation Additional Comments With cues and practice pt able to progress equal reciprocal gait pattern, improved heel off, and improved heel strike.   Balance   Static Sitting Normal   Dynamic Sitting Good   Static Standing Fair +   Dynamic Standing Fair +   Ambulatory Fair +   Endurance Deficit   Endurance Deficit No   Activity Tolerance   Activity Tolerance Patient limited by pain   Medical Staff Made Aware discussed case with orthopedics    Assessment   Prognosis Good   Problem List Decreased strength;Decreased range of motion;Impaired balance;Decreased mobility;Pain;Orthopedic restrictions   Assessment Pt exhibits physical deficits noted in problem list above.  Deficits listed contribute to functional limitations that are significant from the patient PLOF and include: difficulty with bed mobility, impaired standing balance, unsafe/inefficient ambulation, and fall risk    Additionally, patient is limited by restrictions/precautions/DME: LLE WBAT.    Additional considerations affecting pt's discharge planning: Significant amount of stairs to manage in order to access living quarters    During today's session, focussed on education for pain management techniques, correction of gait, safety and technique of transfer training, and utilization of RW.  Pt progressing very good and should have no difficulty returning home at discharge.     The -PAC Mobility Score was used to assist in determining pt safety w/ mobility/self care & appropriate d/c recommendations, see above for scores. Patient's clinical presentation is evolving due to significant acute change in functional independence, uncontrolled pain, significant need for care assistance compared to baseline, recent surgery/procedure, and ongoing medical management needs.     From a PT/mobility standpoint given the above findings, DC recommendation is level: III (Minimum Rehab Resource Intensity)    Considering the patient's PLOF, co-morbidities, acute functional limitations, functional outcome measures, and/or goal to progress functional independence; this patient would continue to benefit from skilled Physical Therapy intervention in the acute care setting.   Barriers to Discharge None   Goals   Patient Goals return to PLOF   [facility] Expiration Date 07/13/25   Short Term Goal #1 1: Pt will perform supine<>sit transfer on flat bed, mod I. 2: Pt will perform stand pivot transfer with RW, mod I. 3: Pt will  ambulate 150' with reciprocal gait and appropriate pace using RW, mod I. 4: Pt will perform written HEP, mod I. 5: Pt will ascend/descend a curb step and/or stairs require to mobilize around the the patient's home with RW/rails, Sup A.   Plan   Treatment/Interventions Functional transfer training;LE strengthening/ROM;Elevations;Therapeutic exercise;Patient/family training;Equipment eval/education;Bed mobility;Gait training   PT Frequency Twice a day   Discharge Recommendation   Rehab Resource Intensity Level, PT III (Minimum Resource Intensity)   AM-PAC Basic Mobility Inpatient   Turning in Flat Bed Without Bedrails 4   Lying on Back to Sitting on Edge of Flat Bed Without Bedrails 4   Moving Bed to Chair 4   Standing Up From Chair Using Arms 4   Walk in Room 3   Climb 3-5 Stairs With Railing 3   Basic Mobility Inpatient Raw Score 22   Basic Mobility Standardized Score 47.4   [facility] Highest Level Of Mobility   -HLM Goal 7: Walk 25 feet or more   JH-HLM Achieved 7: Walk 25 feet or more   Education   Education Provided Mobility training;Assistive device   Patient Demonstrates verbal understanding;Demonstrates acceptance/verbal understanding   End of Consult   Patient Position at End of Consult Bedside chair;All needs within reach     The patient's AM-PAC Basic Mobility Inpatient Short Form Raw Score is 22. A Raw score of greater than or equal to 16 suggests the patient may benefit from discharge to home. Please also refer to the recommendation of the Physical Therapist for safe discharge planning.    Sukhi House, PT, DPT  [place] Licensure #[id number]         [1]   Past Medical History:  Diagnosis Date    Anxiety     Arthritis     Complex regional pain syndrome i of left lower limb     Depression     Gastroparesis     PONV (postoperative nausea and vomiting)     Raynaud's phenomenon without gangrene    [2]   Past Surgical History:  Procedure Laterality Date    APPENDECTOMY      AUGMENTATION BREAST Bilateral      AUGMENTATION MAMMAPLASTY  2014    COLONOSCOPY      COSMETIC SURGERY  Botox / Filler    May 2024 / 2022    EGD AND COLONOSCOPY      HYSTERECTOMY      KNEE SURGERY Left 01/2019    x4    SPINAL CORD STIMULATOR IMPLANT  12/24

## 2025-07-04 NOTE — PLAN OF CARE
Problem: PHYSICAL THERAPY ADULT  Goal: Performs mobility at highest level of function for planned discharge setting.  See evaluation for individualized goals.  Description: Treatment/Interventions: Functional transfer training, LE strengthening/ROM, Elevations, Therapeutic exercise, Patient/family training, Equipment eval/education, Bed mobility, Gait training          See flowsheet documentation for full assessment, interventions and recommendations.  Outcome: Progressing  Note: Prognosis: Good  Problem List: Decreased strength, Decreased range of motion, Impaired balance, Decreased mobility, Pain, Orthopedic restrictions  Assessment: Pt exhibits physical deficits noted in problem list above.  Deficits listed contribute to functional limitations that are significant from the patient PLOF and include: difficulty with bed mobility, impaired standing balance, unsafe/inefficient ambulation, and fall risk    Additionally, patient is limited by restrictions/precautions/DME: LLE WBAT.    Additional considerations affecting pt's discharge planning: Significant amount of stairs to manage in order to access living quarters    During today's session, focussed on education for pain management techniques, correction of gait, safety and technique of transfer training, and utilization of RW.  Pt progressing very good and should have no difficulty returning home at discharge.     The -PAC Mobility Score was used to assist in determining pt safety w/ mobility/self care & appropriate d/c recommendations, see above for scores. Patient's clinical presentation is evolving due to significant acute change in functional independence, uncontrolled pain, significant need for care assistance compared to baseline, recent surgery/procedure, and ongoing medical management needs.     From a PT/mobility standpoint given the above findings, DC recommendation is level: III (Minimum Rehab Resource Intensity)    Considering the patient's PLOF,  co-morbidities, acute functional limitations, functional outcome measures, and/or goal to progress functional independence; this patient would continue to benefit from skilled Physical Therapy intervention in the acute care setting.  Barriers to Discharge: None     Rehab Resource Intensity Level, PT: III (Minimum Resource Intensity)    See flowsheet documentation for full assessment.

## 2025-07-04 NOTE — PROGRESS NOTES
"Progress Note - Orthopedics   Name: Laura Rhoades 55 y.o. female I MRN: [id number]  Unit/Bed#: -01 I Date of Admission: 7/3/2025   Date of Service: 7/4/2025 I Hospital Day: 0    Assessment & Plan  S/P total knee arthroplasty, left  - Pt doing well  - D/C home today        Subjective   55 y.o.female sp left TKA with tibial orif.  No acute events, no new complaints. Pain well controlled. Denies fevers, chills, CP, SOB, N/V, numbness or tingling. Patient reports no issues with urination or bowel movements. Patient states she is ready to go home.    Objective :  Temp:  [96.9 °F (36.1 °C)-99.5 °F (37.5 °C)] 97.5 °F (36.4 °C)  HR:  [56-81] 81  BP: ()/(52-75) 119/58  Resp:  [13-22] 18  SpO2:  [95 %-99 %] 97 %  O2 Device: None (Room air)    Physical Exam  Musculoskeletal: Left lower extremity  Skin warm, dry . No erythema or ecchymosis.  Vascular Status well perfused  Neurologic Status NVI distally  Dressing intact  Motor intact anterior interosseous nerve/posterior interosseous nerve/median/radial/ulnar nerve distributions  No calf swelling or tenderness to palpation      Lab Results: I have reviewed the following results:  Recent Labs     07/04/25  0447   WBC 11.30*   HGB 11.4*   HCT 34.8      BUN 14   CREATININE 0.64     Blood Culture:  No results found for: \"BLOODCX\"  Wound Culture: No results found for: \"WOUNDCULT\"    Imaging Results Review: I personally reviewed the following image studies in PACS and associated radiology reports: xray left knee. My interpretation of the radiology images/reports is: hardware in place.  Other Study Results Review: No additional pertinent studies reviewed.  "

## 2025-07-04 NOTE — NURSING NOTE
Pt is discharged home. Instructions and education about surgery site was provided to the pt.   IV was removed. Dressing was dry and intact.

## 2025-07-07 ENCOUNTER — TELEPHONE (OUTPATIENT)
Dept: OBGYN CLINIC | Facility: HOSPITAL | Age: 55
End: 2025-07-07

## 2025-07-07 ENCOUNTER — OFFICE VISIT (OUTPATIENT)
Dept: PHYSICAL THERAPY | Facility: CLINIC | Age: 55
End: 2025-07-07
Payer: OTHER GOVERNMENT

## 2025-07-07 DIAGNOSIS — G89.29 CHRONIC PAIN OF LEFT KNEE: Primary | ICD-10-CM

## 2025-07-07 DIAGNOSIS — M25.562 CHRONIC PAIN OF LEFT KNEE: Primary | ICD-10-CM

## 2025-07-07 DIAGNOSIS — Z96.652 TOTAL KNEE REPLACEMENT STATUS, LEFT: ICD-10-CM

## 2025-07-07 PROCEDURE — 97110 THERAPEUTIC EXERCISES: CPT | Performed by: PHYSICAL THERAPIST

## 2025-07-07 PROCEDURE — 97164 PT RE-EVAL EST PLAN CARE: CPT | Performed by: PHYSICAL THERAPIST

## 2025-07-07 NOTE — TELEPHONE ENCOUNTER
Caller: patient    Doctor: Dr. Santana    Reason for call: patient is returning missed call from nurse ludy.    I was advised to task per Nurse Ludy    Call back#: [phone]

## 2025-07-07 NOTE — PROGRESS NOTES
First Follow Up     Today's date: 2025  Patient name: Laura Rhoades  : 1970  MRN: [id number]  Referring provider: Zaira Santana MD  Dx:   Encounter Diagnosis     ICD-10-CM    1. Chronic pain of left knee  M25.562     G89.29       2. Total knee replacement status, left  Z96.652           Start Time: 1018  Stop Time: 1054  Total time in clinic (min): 36 minutes    Subjective: Pt reports she did well with surgery performed on 7/3/25. She is able to take off the dressing and shower tomorrow per pt when she had her nurse call. She notes 3/10 pain is her best and current pain level. Pt notes sleeping is going okay and she's been putting her leg up via her bed. She keeps her knee straight as best she can. Pt notes she gets up and walks around several times a day.     Pain worst: 7/10  Pain best: 3/10  Objective: See treatment diary below    Active Range of Motion   Left Knee   Flexion: 55 degrees   Extension: -5 degrees     Right Knee   Normal active range of motion    Passive Range of Motion   Left Knee   Flexion: 65 degrees   Extension: -3 degrees with pain    Mobility   Patellar Mobility:   Left Knee   WFL: medial, lateral, superior and inferior.     Strength/Myotome Testing (tested pre-op)    Left Hip   Planes of Motion   Flexion: 4+  Extension: 4  Abduction: 4-  External rotation: 4  Internal rotation: 4    Right Knee   Flexion: 4  Extension: 4-    L knee swelling: supra 37.5 cm  Infra 34 cm    WELLS DVT CRITERIA:  DVT Risk  (+1) Active cancer within last 6 months negative  (+1) Paralysis, paresis or recent plaster immobilization of legs  negative  (+1) Recent Bedridden for >3 days or major surgery within last 12 weeks  positive  (+1) Localized tenderness along deep venous system  negative  (+1) Entire leg swelling  negative  (+1) Calf swelling > 3cm compared with asymptomatic leg (10cm below tibial tuberosity)  negative  (+1) Pitting Edema  negative  (+1) Collateral supervicial veins (non-varicose)   negative  (+1) Previously documented DVT  negative    (-2)  Alternative diagnosis more likely (muscle tear, cellulitis, etc)  positive    Score: -1  DVT considered likely in patients with score of 2 or more, unlikely if less than 2     The surgical site was examined, appropriate closure of the wound and scar formation. No abnormal drainage, effusion, discoloration, or odor was found.     Assessment: Impairments: abnormal gait, abnormal or restricted ROM, abnormal movement, activity intolerance, impaired balance, impaired physical strength, lacks appropriate home exercise program, pain with function, poor posture , poor body mechanics, participation limitations, activity limitations and endurance    Laura Rhoades is a 55 y.o. female who presents with signs and symptoms consistent of TKR. Patient presents with pain, decreased strength, decreased ROM, decreased joint mobility, ambulatory dysfunction, postural dysfunction, and balance dysfunction. Due to these impairments, Patient has difficulty performing ambulating, prolonged sitting, prolonged standing, squatting, transferring, weight bearing over involved LE, stair negotiating , and driving. Patient would benefit from skilled physical therapy to address the impairments, improve their level of function, and to improve their overall quality of life. Reviewed HEP, involved anatomy, physio as well as POC with verbalized understanding and pt is in agreement with above.     Goals  Short Term Goals: to be achieved by 4 weeks  1) Patient to be independent with basic HEP  2) Decrease pain to 2/10 at its worst  3) Increase knee ROM by 5-10 degrees in all affected planes  4) Increase LE strength by 1/2 MMT grade in all affected planes    Long Term Goals: to be achieved by discharge  1) FOTO equal to or greater than projected goal.  2) Ambulation to improve to maximal level of function  3) Stair negotiation will improve to reciprocal  4) Sit to stand transfers will improve to  "maximal level of function    Plan: Patient would benefit from: skilled physical therapy    Planned therapy interventions: abdominal trunk stabilization, ADL training, balance, body mechanics training, home exercise program, functional ROM exercises, flexibility, therapeutic exercise, therapeutic activities, stretching, strengthening, joint mobilization, manual therapy, neuromuscular re-education, patient education and postural training    Frequency: 2x week  Duration in weeks: 10  Treatment plan discussed with: patient     Precautions: anxiety, depression      Manuals 6/23            PROM                                                    Neuro Re-Ed                                                                                                        Ther Ex             NuStep             LAQ 10x            Heel slides 5\"x10            Calf S 10\"x5            HS S 10\"x5                                                   Ther Activity             Pt edu: involved anatomy, physio, HEP, POC, movement patterns  RE                         Gait Training                                       Modalities                                            "

## 2025-07-07 NOTE — HOME EXERCISE EDUCATION
Program_ID:[id number]   Access Code: [email or web address]  URL: [email or web address]  Date: 07-  Prepared By: Elizabeth Clark    Program Notes      Exercises      - Seated Knee Flexion AAROM - 1-3 x daily - 3-7 x weekly - 1 sets - 10 reps - 5-10 seconds hold      - Long Sitting Calf Stretch with Strap - 1-3 x daily - 3-7 x weekly - 1 sets - 10 reps - 10 seconds hold      - Seated Table Hamstring Stretch - 1-3 x daily - 3-7 x weekly - 1 sets - 10 reps - 10 seconds hold      - Seated Long Arc Quad - 1-3 x daily - 3-7 x weekly - 1-2 sets - 10 reps

## 2025-07-07 NOTE — TELEPHONE ENCOUNTER
"Patient contacted for a postoperative follow up assessment. Patient reports doing  \"good,\" and has been doing steps, and reports it \"hasn't been terrible.\" She is ambulating with the RW and can for the steps. She reports having OP PT today, and is doing exercises taught to her.   Patient denies increase in swelling and dressing is clean, dry and intact. Patient is icing the site and we discussed postoperative swelling, continuing to ICE areas of pain/swelling, especially after increased activity over the next few weeks.      We reviewed AVS for the following:   May remove dressing 5 days from date of surgery OR may leave dressing in place until follow up office visit 2 weeks from surgery date Keep dressing/incision clean and intact until follow up appointment. Do not apply any creams or ointments/lotions to your incisions including antibiotic ointment, peroxide     We reviewed patients AVS medication list. Patient is taking Tylenol 650mg every 8 hours, Celebrex daily, Oxycodone 5mg PRN (took 1 tablet yesterday), Robaxin PRN TID,  ASA 81mg BID (discussed for 35 days), Methylprednisolone as prescribed, and Colace 100mg BID. Patient has not yet had a BM - she reports often having issues with BMS, she added an OTC stool softener as well. We discussed adding OTC Miralax as well.      Patient denies nausea, vomiting, abdominal pain, chest pain, shortness of breath, fever, dizziness and calf pain. Patient does not have any other questions or concerns at this time. Pt was encouraged to call with any questions, concerns or issues.    "

## 2025-07-08 NOTE — TELEPHONE ENCOUNTER
Patient called me directly. We discussed constipation, she is taking stool softener, and added Miralax yesterday and today. She did have very small BM, and we discussed continuing to take both of those options, until going regularly. We discussed if distention/feeling full she can try the enema she has, but at this point, continue with the OTC stool softener and Miralax for assistance.    We then discussed postoperative pain and healing expectations.     pt encouraged to call me with questions, concerns or issues.

## 2025-07-10 ENCOUNTER — OFFICE VISIT (OUTPATIENT)
Dept: PHYSICAL THERAPY | Facility: CLINIC | Age: 55
End: 2025-07-10
Payer: OTHER GOVERNMENT

## 2025-07-10 ENCOUNTER — TELEPHONE (OUTPATIENT)
Dept: OBGYN CLINIC | Facility: HOSPITAL | Age: 55
End: 2025-07-10

## 2025-07-10 DIAGNOSIS — G89.29 CHRONIC PAIN OF LEFT KNEE: Primary | ICD-10-CM

## 2025-07-10 DIAGNOSIS — Z96.652 TOTAL KNEE REPLACEMENT STATUS, LEFT: ICD-10-CM

## 2025-07-10 DIAGNOSIS — M25.562 CHRONIC PAIN OF LEFT KNEE: Primary | ICD-10-CM

## 2025-07-10 DIAGNOSIS — M22.2X2 PATELLOFEMORAL SYNDROME OF LEFT KNEE: ICD-10-CM

## 2025-07-10 DIAGNOSIS — M17.12 PRIMARY OSTEOARTHRITIS OF LEFT KNEE: ICD-10-CM

## 2025-07-10 DIAGNOSIS — M17.12 ARTHRITIS OF LEFT KNEE: ICD-10-CM

## 2025-07-10 PROCEDURE — 97110 THERAPEUTIC EXERCISES: CPT

## 2025-07-10 PROCEDURE — 97140 MANUAL THERAPY 1/> REGIONS: CPT

## 2025-07-10 RX ORDER — HYDROCODONE BITARTRATE AND ACETAMINOPHEN 5; 325 MG/1; MG/1
1 TABLET ORAL EVERY 6 HOURS PRN
Qty: 20 TABLET | Refills: 0 | Status: SHIPPED | OUTPATIENT
Start: 2025-07-10 | End: 2025-07-20

## 2025-07-10 NOTE — TELEPHONE ENCOUNTER
Spoke to patient. Educated on new script of Norco. We discussed each tablet having 325mg of Tylenol, and needing to be under 3000mg/24 hours of tylenol. She was understanding, discussed using 500mg Tylenol for additional pain support if needed but only 3x a day, to be at that 3000mg/24 hours recommendation.     pt encouraged to call me with questions, concerns or issues.

## 2025-07-10 NOTE — PROGRESS NOTES
"Daily Note     Today's date: 7/10/2025  Patient name: Laura Rhoades  : 1970  MRN: [id number]  Referring provider: Zaira Santana MD  Dx:   Encounter Diagnosis     ICD-10-CM    1. Chronic pain of left knee  M25.562     G89.29       2. Total knee replacement status, left  Z96.652           Start Time: 1048  Stop Time: 1130  Total time in clinic (min): 42 minutes    Subjective: Pt reports she is \"not feeling well\". Laura states she attempted short distance amb with SPC, and feels this may have made her more sore today. She had taken Oxy medication last night with no relief per intake. She notes when she takes the pain medication it makes her feel worse, and gives her a headaches.      Objective: See treatment diary below      Assessment: Tolerated treatment fair. Pt is given rest breaks between exercises. Began session with ice pack to assist with pain modulation. Exercises were performed to tolerance. Pt educated to continue to utilize ice modalities at home every hour to two hours while awake for 15-20 min with LE elevated. She was additionally advised to call MD to about pain medication recommendations. Pt verbalizes understanding. Patient demonstrated fatigue post treatment, exhibited good technique with therapeutic exercises, and would benefit from continued PT      Plan: Continue per plan of care.      Precautions: anxiety, depression      Manuals 6/23 7/10           PROM  FB                                                  Neuro Re-Ed                                                                                                        Ther Ex             NuStep             LAQ 10x 10x           Heel slides 5\"x10 5''x10           Calf S 10\"x5 CP with exercises 10''x5           HS S 10\"x5 10''x5           Quad Set  5''x15           SAQ  5''x10                        Ther Activity             Pt edu: involved anatomy, physio, HEP, POC, movement patterns  RE                         Gait Training        "                                Modalities

## 2025-07-10 NOTE — TELEPHONE ENCOUNTER
Pt called directly. She reports in past issues with oxycodone, and reports nausea and headaches following the Oxycodone administration.     She reports Tylenol 1000mg TID, Celebrex 200mg daily, Robaxin 500mg TID. She continues her typical Gabapentin (prescribed preop).     She stopped the oxycodone (last dose last evening) and reports current pain of 8/10 consistently. She reports unable to rest due to pain.     She is aware I will work with surgeon and be in touch.

## 2025-07-15 ENCOUNTER — TELEPHONE (OUTPATIENT)
Dept: OBGYN CLINIC | Facility: HOSPITAL | Age: 55
End: 2025-07-15

## 2025-07-15 ENCOUNTER — HOSPITAL ENCOUNTER (OUTPATIENT)
Dept: RADIOLOGY | Facility: HOSPITAL | Age: 55
Discharge: HOME | End: 2025-07-15
Attending: PHYSICIAN ASSISTANT
Payer: OTHER GOVERNMENT

## 2025-07-15 ENCOUNTER — OFFICE VISIT (OUTPATIENT)
Dept: OBGYN CLINIC | Facility: CLINIC | Age: 55
End: 2025-07-15

## 2025-07-15 VITALS — BODY MASS INDEX: 21.68 KG/M2 | HEIGHT: 64 IN | WEIGHT: 127 LBS

## 2025-07-15 DIAGNOSIS — Z96.652 S/P TOTAL KNEE ARTHROPLASTY, LEFT: Primary | ICD-10-CM

## 2025-07-15 DIAGNOSIS — Z96.652 S/P TOTAL KNEE ARTHROPLASTY, LEFT: ICD-10-CM

## 2025-07-15 PROCEDURE — 73562 X-RAY EXAM OF KNEE 3: CPT

## 2025-07-15 PROCEDURE — 99024 POSTOP FOLLOW-UP VISIT: CPT | Performed by: PHYSICIAN ASSISTANT

## 2025-07-15 RX ORDER — CELECOXIB 200 MG/1
200 CAPSULE ORAL DAILY
Qty: 30 CAPSULE | Refills: 0 | Status: SHIPPED | OUTPATIENT
Start: 2025-07-15

## 2025-07-16 ENCOUNTER — OFFICE VISIT (OUTPATIENT)
Dept: PHYSICAL THERAPY | Facility: CLINIC | Age: 55
End: 2025-07-16
Payer: OTHER GOVERNMENT

## 2025-07-16 DIAGNOSIS — G89.29 CHRONIC PAIN OF LEFT KNEE: Primary | ICD-10-CM

## 2025-07-16 DIAGNOSIS — Z96.652 TOTAL KNEE REPLACEMENT STATUS, LEFT: ICD-10-CM

## 2025-07-16 DIAGNOSIS — M25.562 CHRONIC PAIN OF LEFT KNEE: Primary | ICD-10-CM

## 2025-07-16 PROCEDURE — 97112 NEUROMUSCULAR REEDUCATION: CPT | Performed by: PHYSICAL THERAPIST

## 2025-07-16 PROCEDURE — 97110 THERAPEUTIC EXERCISES: CPT | Performed by: PHYSICAL THERAPIST

## 2025-07-16 NOTE — PROGRESS NOTES
"Daily Note     Today's date: 2025  Patient name: Laura Rhoades  : 1970  MRN: [id number]  Referring provider: Zaira Santana MD  Dx:   Encounter Diagnosis     ICD-10-CM    1. Chronic pain of left knee  M25.562     G89.29       2. Total knee replacement status, left  Z96.652           Start Time: 1051  Stop Time: 1145  Total time in clinic (min): 54 minutes    Subjective: Pt saw the ortho yesterday and he removed the staples. She had a great visit and he was very pleased with her progress thus far.      Objective: See treatment diary below      Assessment: Tolerated treatment well. Patient demonstrated fatigue post treatment, exhibited good technique with therapeutic exercises, and would benefit from continued PT. Pt did really well today noting no pain despite progressions made today. Pts gait is also improving sig.      Plan: Continue per plan of care.  Progress treatment as tolerated.       Precautions: anxiety, depression    1:1 [id number]  Manuals 6/23 7/10 7/16          PROM  FB RE          STM   RE                                    Neuro Re-Ed             Stand abd   10x B          Stand ext   10x B                                                                           Ther Ex             NuStep   L1 10'          LAQ 10x 10x 15x          Heel slides 5\"x10 5''x10           Calf S 10\"x5 CP with exercises 10''x5 10\"x10          HS S 10\"x5 10''x5           Quad Set  5''x15 5\"x20          SAQ  5''x10 HEP          bridges   3\"x15          Ther Activity             Pt edu: involved anatomy, physio, HEP, POC, movement patterns  RE                         Gait Training                                       Modalities                                                "

## 2025-07-17 ENCOUNTER — TELEPHONE (OUTPATIENT)
Dept: OBGYN CLINIC | Facility: HOSPITAL | Age: 55
End: 2025-07-17

## 2025-07-17 NOTE — TELEPHONE ENCOUNTER
7/3 TKA. Patient left me a VM requesting a call. I spoke to patient directly. She is requesting a script for a   Neuromuscular Electric Pulse Stimulator machine. She states insurance will cover it with an order (reports similar to TENS unit).     She states she feels this would help with her recovery. Pt aware I would notify the surgeon and be in touch.

## 2025-07-18 ENCOUNTER — OFFICE VISIT (OUTPATIENT)
Dept: PHYSICAL THERAPY | Facility: CLINIC | Age: 55
End: 2025-07-18
Payer: OTHER GOVERNMENT

## 2025-07-18 DIAGNOSIS — M25.562 CHRONIC PAIN OF LEFT KNEE: Primary | ICD-10-CM

## 2025-07-18 DIAGNOSIS — G89.29 CHRONIC PAIN OF LEFT KNEE: Primary | ICD-10-CM

## 2025-07-18 PROCEDURE — 97112 NEUROMUSCULAR REEDUCATION: CPT | Performed by: PHYSICAL THERAPIST

## 2025-07-18 PROCEDURE — 97140 MANUAL THERAPY 1/> REGIONS: CPT | Performed by: PHYSICAL THERAPIST

## 2025-07-18 PROCEDURE — 97110 THERAPEUTIC EXERCISES: CPT | Performed by: PHYSICAL THERAPIST

## 2025-07-18 NOTE — PROGRESS NOTES
"Daily Note     Today's date: 2025  Patient name: Laura Rhoades  : 1970  MRN: [id number]  Referring provider: Zaira Santana MD  Dx:   Encounter Diagnosis     ICD-10-CM    1. Chronic pain of left knee  M25.562     G89.29           Start Time: 1106  Stop Time: 1205  Total time in clinic (min): 59 minutes    Subjective: Pt reports she felt great after the last session.      Objective: See treatment diary below      Assessment: Tolerated treatment well. Patient demonstrated fatigue post treatment, exhibited good technique with therapeutic exercises, and would benefit from continued PT. Pt did really well today. Encouraged her to work on heel slides to continue to improve knee flexion ROM.      Plan: Continue per plan of care.  Progress treatment as tolerated.       Precautions: anxiety, depression      Manuals 6/23 7/10 7/16 7/18         PROM  FB RE RE         STM   RE RE                                   Neuro Re-Ed             Stand abd   10x B 2x10 B         Stand ext   10x B 2x10 B                                                                          Ther Ex             NuStep   L1 10' L3 8'         LAQ 10x 10x 15x 3\"x20         Heel slides 5\"x10 5''x10  5\"x10 pball         Calf S 10\"x5 CP with exercises 10''x5 10\"x10          HS S 10\"x5 10''x5           Quad Set  5''x15 5\"x20 5\"x20         SAQ  5''x10 HEP          bridges   3\"x15 3\"X20         Ther Activity             Pt edu: involved anatomy, physio, HEP, POC, movement patterns  RE                         Gait Training                                       Modalities                                                  "

## 2025-07-21 DIAGNOSIS — Z96.652 S/P TOTAL KNEE ARTHROPLASTY, LEFT: Primary | ICD-10-CM

## 2025-07-21 NOTE — TELEPHONE ENCOUNTER
Spoke to patient, we discussed order and discussed ordering through [employer], she denies doing so at this time.     She is going to look into other DME companies, and let me know where to FAX this script to.

## 2025-07-21 NOTE — TELEPHONE ENCOUNTER
Patient called back, would not like me to proceed with TENS unit ordering.     Patient will use hers, and discuss placement with PT tomorrow.

## 2025-07-22 ENCOUNTER — OFFICE VISIT (OUTPATIENT)
Dept: PHYSICAL THERAPY | Facility: CLINIC | Age: 55
End: 2025-07-22
Payer: OTHER GOVERNMENT

## 2025-07-22 DIAGNOSIS — G89.29 CHRONIC PAIN OF LEFT KNEE: Primary | ICD-10-CM

## 2025-07-22 DIAGNOSIS — M25.562 CHRONIC PAIN OF LEFT KNEE: Primary | ICD-10-CM

## 2025-07-22 DIAGNOSIS — Z96.652 TOTAL KNEE REPLACEMENT STATUS, LEFT: ICD-10-CM

## 2025-07-22 PROCEDURE — 97140 MANUAL THERAPY 1/> REGIONS: CPT | Performed by: PHYSICAL THERAPIST

## 2025-07-22 PROCEDURE — 97112 NEUROMUSCULAR REEDUCATION: CPT | Performed by: PHYSICAL THERAPIST

## 2025-07-22 PROCEDURE — 97110 THERAPEUTIC EXERCISES: CPT | Performed by: PHYSICAL THERAPIST

## 2025-07-22 NOTE — PROGRESS NOTES
"Daily Note     Today's date: 2025  Patient name: Laura Rhoades  : 1970  MRN: [id number]  Referring provider: Zaira Santana MD  Dx:   Encounter Diagnosis     ICD-10-CM    1. Chronic pain of left knee  M25.562     G89.29       2. Total knee replacement status, left  Z96.652           Start Time: 818  Stop Time: 906  Total time in clinic (min): 48 minutes    Subjective: Pt presents on [facility]PC and has been feeling pretty good lately per pt report.      Objective: See treatment diary below      Assessment: Tolerated treatment well. Patient demonstrated fatigue post treatment, exhibited good technique with therapeutic exercises, and would benefit from continued PT. Encouraged pt to keep working on knee flexion ROM at home but otherwise she is doing really well. Reviewed where to put the pads for TENS as she has one at home to use.      Plan: Continue per plan of care.  Progress treatment as tolerated.       Precautions: anxiety, depression      Manuals 6/23 7/10 7/16 7/18 7/22        PROM  FB RE RE RE        STM   RE RE RE                                  Neuro Re-Ed             Stand abd   10x B 2x10 B 2x15 B        Stand ext   10x B 2x10 B 2x15 B        Mini squat     x10                                                            Ther Ex             NuStep   L1 10' L3 8' L1 8'        LAQ 10x 10x 15x 3\"x20 3\"x20        Heel slides 5\"x10 5''x10  5\"x10 pball 10\"x10        Calf S 10\"x5 CP with exercises 10''x5 10\"x10  10\"x10        HS S 10\"x5 10''x5   10\"x10        Quad Set  5''x15 5\"x20 5\"x20 5\"X10        SAQ  5''x10 HEP          bridges   3\"x15 3\"X20         Ther Activity             Pt edu: involved anatomy, physio, HEP, POC, movement patterns  RE                         Gait Training                                       Modalities                                                    "

## 2025-07-25 ENCOUNTER — OFFICE VISIT (OUTPATIENT)
Dept: PHYSICAL THERAPY | Facility: CLINIC | Age: 55
End: 2025-07-25
Payer: OTHER GOVERNMENT

## 2025-07-25 DIAGNOSIS — G89.29 CHRONIC PAIN OF LEFT KNEE: Primary | ICD-10-CM

## 2025-07-25 DIAGNOSIS — M25.562 CHRONIC PAIN OF LEFT KNEE: Primary | ICD-10-CM

## 2025-07-25 DIAGNOSIS — Z96.652 TOTAL KNEE REPLACEMENT STATUS, LEFT: ICD-10-CM

## 2025-07-25 PROCEDURE — 97110 THERAPEUTIC EXERCISES: CPT | Performed by: PHYSICAL THERAPIST

## 2025-07-25 PROCEDURE — 97112 NEUROMUSCULAR REEDUCATION: CPT | Performed by: PHYSICAL THERAPIST

## 2025-07-25 NOTE — PROGRESS NOTES
"Daily Note     Today's date: 2025  Patient name: Laura Rhoades  : 1970  MRN: [id number]  Referring provider: Zaira Santana MD  Dx:   Encounter Diagnosis     ICD-10-CM    1. Chronic pain of left knee  M25.562     G89.29       2. Total knee replacement status, left  Z96.652           Start Time: 1127  Stop Time: 1200  Total time in clinic (min): 33 minutes    Subjective: Pt notes no changes since last session. She did arrive 15 min late for appt therefore tx had to be cut short. Notes some stretching and a little popping with knee bending but no pain with it.      Objective: See treatment diary below      Assessment: Tolerated treatment well. Patient demonstrated fatigue post treatment, exhibited good technique with therapeutic exercises, and would benefit from continued PT. Reviewed that the popping and stretch is normal in a post op knee. Added some ankle weights to LE lifts with good tolerance on both sides.      Plan: Continue per plan of care.  Progress treatment as tolerated.       Precautions: anxiety, depression    1:1 [id number]  Manuals 6/23 7/10 7/16 7/18 7/22 7/25       PROM  FB RE RE RE RE       STM   RE RE RE RE                                 Neuro Re-Ed             Stand abd   10x B 2x10 B 2x15 B 2# 2x15 B       Stand ext   10x B 2x10 B 2x15 B 2# 2x15 B       Mini squat     x10                                                            Ther Ex             HS curls             NuStep   L1 10' L3 8' L1 8' L3 5'       LAQ 10x 10x 15x 3\"x20 3\"x20        Heel slides 5\"x10 5''x10  5\"x10 pball 10\"x10 10\"x10       Calf S 10\"x5 CP with exercises 10''x5 10\"x10  10\"x10 10\"x10       HS S 10\"x5 10''x5   10\"x10 10\"x10       Quad Set  5''x15 5\"x20 5\"x20 5\"X10        SAQ  5''x10 HEP          bridges   3\"x15 3\"X20         Ther Activity             Pt edu: involved anatomy, physio, HEP, POC, movement patterns  RE                         Gait Training                                       Modalities      "

## 2025-07-28 ENCOUNTER — TELEPHONE (OUTPATIENT)
Dept: OBGYN CLINIC | Facility: HOSPITAL | Age: 55
End: 2025-07-28

## 2025-07-28 ENCOUNTER — OFFICE VISIT (OUTPATIENT)
Dept: PHYSICAL THERAPY | Facility: CLINIC | Age: 55
End: 2025-07-28
Payer: OTHER GOVERNMENT

## 2025-07-28 DIAGNOSIS — Z96.652 TOTAL KNEE REPLACEMENT STATUS, LEFT: ICD-10-CM

## 2025-07-28 DIAGNOSIS — Z96.652 S/P TOTAL KNEE ARTHROPLASTY, LEFT: Primary | ICD-10-CM

## 2025-07-28 DIAGNOSIS — M25.562 CHRONIC PAIN OF LEFT KNEE: Primary | ICD-10-CM

## 2025-07-28 DIAGNOSIS — G89.29 CHRONIC PAIN OF LEFT KNEE: Primary | ICD-10-CM

## 2025-07-28 PROCEDURE — 97112 NEUROMUSCULAR REEDUCATION: CPT | Performed by: PHYSICAL THERAPIST

## 2025-07-28 PROCEDURE — 97110 THERAPEUTIC EXERCISES: CPT | Performed by: PHYSICAL THERAPIST

## 2025-07-28 PROCEDURE — 97140 MANUAL THERAPY 1/> REGIONS: CPT | Performed by: PHYSICAL THERAPIST

## 2025-07-28 RX ORDER — CELECOXIB 200 MG/1
200 CAPSULE ORAL 2 TIMES DAILY
Qty: 60 CAPSULE | Refills: 2 | Status: SHIPPED | OUTPATIENT
Start: 2025-07-28

## 2025-07-28 RX ORDER — TRAMADOL HYDROCHLORIDE 50 MG/1
50 TABLET ORAL EVERY 6 HOURS PRN
Qty: 40 TABLET | Refills: 0 | Status: SHIPPED | OUTPATIENT
Start: 2025-07-28

## 2025-07-30 ENCOUNTER — OFFICE VISIT (OUTPATIENT)
Dept: PHYSICAL THERAPY | Facility: CLINIC | Age: 55
End: 2025-07-30
Payer: OTHER GOVERNMENT

## 2025-07-30 DIAGNOSIS — Z96.652 TOTAL KNEE REPLACEMENT STATUS, LEFT: ICD-10-CM

## 2025-07-30 DIAGNOSIS — G89.29 CHRONIC PAIN OF LEFT KNEE: Primary | ICD-10-CM

## 2025-07-30 DIAGNOSIS — M25.562 CHRONIC PAIN OF LEFT KNEE: Primary | ICD-10-CM

## 2025-07-30 PROCEDURE — 97110 THERAPEUTIC EXERCISES: CPT | Performed by: PHYSICAL THERAPIST

## 2025-07-30 PROCEDURE — 97112 NEUROMUSCULAR REEDUCATION: CPT | Performed by: PHYSICAL THERAPIST

## 2025-07-30 PROCEDURE — 97140 MANUAL THERAPY 1/> REGIONS: CPT | Performed by: PHYSICAL THERAPIST

## 2025-08-04 ENCOUNTER — TELEPHONE (OUTPATIENT)
Dept: OBGYN CLINIC | Facility: HOSPITAL | Age: 55
End: 2025-08-04

## 2025-08-05 ENCOUNTER — OFFICE VISIT (OUTPATIENT)
Dept: PHYSICAL THERAPY | Facility: CLINIC | Age: 55
End: 2025-08-05
Payer: OTHER GOVERNMENT

## 2025-08-05 DIAGNOSIS — M25.562 CHRONIC PAIN OF LEFT KNEE: Primary | ICD-10-CM

## 2025-08-05 DIAGNOSIS — Z96.652 S/P TOTAL KNEE ARTHROPLASTY, LEFT: Primary | ICD-10-CM

## 2025-08-05 DIAGNOSIS — Z96.652 TOTAL KNEE REPLACEMENT STATUS, LEFT: ICD-10-CM

## 2025-08-05 DIAGNOSIS — G89.29 CHRONIC PAIN OF LEFT KNEE: Primary | ICD-10-CM

## 2025-08-05 PROCEDURE — 97140 MANUAL THERAPY 1/> REGIONS: CPT | Performed by: PHYSICAL THERAPIST

## 2025-08-05 PROCEDURE — 97110 THERAPEUTIC EXERCISES: CPT | Performed by: PHYSICAL THERAPIST

## 2025-08-05 PROCEDURE — 97112 NEUROMUSCULAR REEDUCATION: CPT | Performed by: PHYSICAL THERAPIST

## 2025-08-08 ENCOUNTER — OFFICE VISIT (OUTPATIENT)
Dept: PHYSICAL THERAPY | Facility: CLINIC | Age: 55
End: 2025-08-08
Payer: OTHER GOVERNMENT

## 2025-08-08 DIAGNOSIS — G89.29 CHRONIC PAIN OF LEFT KNEE: Primary | ICD-10-CM

## 2025-08-08 DIAGNOSIS — Z96.652 TOTAL KNEE REPLACEMENT STATUS, LEFT: ICD-10-CM

## 2025-08-08 DIAGNOSIS — M25.562 CHRONIC PAIN OF LEFT KNEE: Primary | ICD-10-CM

## 2025-08-08 PROCEDURE — 97110 THERAPEUTIC EXERCISES: CPT | Performed by: PHYSICAL THERAPIST

## 2025-08-08 PROCEDURE — 97140 MANUAL THERAPY 1/> REGIONS: CPT | Performed by: PHYSICAL THERAPIST

## 2025-08-11 ENCOUNTER — TELEPHONE (OUTPATIENT)
Dept: OBGYN CLINIC | Facility: HOSPITAL | Age: 55
End: 2025-08-11

## 2025-08-12 ENCOUNTER — TELEPHONE (OUTPATIENT)
Dept: OBGYN CLINIC | Facility: HOSPITAL | Age: 55
End: 2025-08-12

## 2025-08-12 ENCOUNTER — OFFICE VISIT (OUTPATIENT)
Dept: PHYSICAL THERAPY | Facility: CLINIC | Age: 55
End: 2025-08-12
Payer: OTHER GOVERNMENT

## 2025-08-20 ENCOUNTER — OFFICE VISIT (OUTPATIENT)
Dept: PHYSICAL THERAPY | Facility: CLINIC | Age: 55
End: 2025-08-20
Payer: OTHER GOVERNMENT

## 2025-08-20 DIAGNOSIS — Z96.652 TOTAL KNEE REPLACEMENT STATUS, LEFT: ICD-10-CM

## 2025-08-20 DIAGNOSIS — G89.29 CHRONIC PAIN OF LEFT KNEE: Primary | ICD-10-CM

## 2025-08-20 DIAGNOSIS — M25.562 CHRONIC PAIN OF LEFT KNEE: Primary | ICD-10-CM

## 2025-08-20 PROCEDURE — 97110 THERAPEUTIC EXERCISES: CPT | Performed by: PHYSICAL THERAPIST

## 2025-08-20 PROCEDURE — 97112 NEUROMUSCULAR REEDUCATION: CPT | Performed by: PHYSICAL THERAPIST

## 2025-08-20 PROCEDURE — 97140 MANUAL THERAPY 1/> REGIONS: CPT | Performed by: PHYSICAL THERAPIST

## 2025-08-21 ENCOUNTER — OFFICE VISIT (OUTPATIENT)
Dept: PHYSICAL THERAPY | Facility: CLINIC | Age: 55
End: 2025-08-21
Payer: OTHER GOVERNMENT

## 2025-08-21 DIAGNOSIS — M25.562 CHRONIC PAIN OF LEFT KNEE: Primary | ICD-10-CM

## 2025-08-21 DIAGNOSIS — Z96.652 TOTAL KNEE REPLACEMENT STATUS, LEFT: ICD-10-CM

## 2025-08-21 DIAGNOSIS — G89.29 CHRONIC PAIN OF LEFT KNEE: Primary | ICD-10-CM

## 2025-08-21 PROCEDURE — 97530 THERAPEUTIC ACTIVITIES: CPT | Performed by: PHYSICAL THERAPIST

## 2025-08-21 PROCEDURE — 97140 MANUAL THERAPY 1/> REGIONS: CPT | Performed by: PHYSICAL THERAPIST

## 2025-08-21 PROCEDURE — 97110 THERAPEUTIC EXERCISES: CPT | Performed by: PHYSICAL THERAPIST

## (undated) DEVICE — 3 BONE CEMENT MIXER: Brand: MIXEVAC

## (undated) DEVICE — STIRRUP STRAP ADULT DISP

## (undated) DEVICE — Device

## (undated) DEVICE — 2.5MM DRILL BIT/QC/GOLD/110MM

## (undated) DEVICE — HOOD: Brand: T7PLUS

## (undated) DEVICE — 3M™ IOBAN™ 2 ANTIMICROBIAL INCISE DRAPE 6650EZ: Brand: IOBAN™ 2

## (undated) DEVICE — SKN PRP WNG SPNGE PVP SCRB STR: Brand: MEDLINE INDUSTRIES, INC.

## (undated) DEVICE — VELYS SATEL DRAPE

## (undated) DEVICE — VELYS ROBOTIC-ASSISTED SOLUTION ARRAY SET - KNEE: Brand: VELYS

## (undated) DEVICE — VELYS ROBOT-ASSISTED SOLUTION ARRAY DRILL PIN 100 MM X 4 MM: Brand: VELYS

## (undated) DEVICE — HANDPIECE SET WITH RETRACTABLE COAXIAL FAN SPRAY TIP AND SUCTION TUBE: Brand: INTERPULSE

## (undated) DEVICE — BASIC SINGLE BASIN-LF: Brand: MEDLINE INDUSTRIES, INC.

## (undated) DEVICE — VELYS ROBOT DRAPE

## (undated) DEVICE — REM POLYHESIVE ADULT PATIENT RETURN ELECTRODE: Brand: VALLEYLAB

## (undated) DEVICE — ANTIBACTERIAL UNDYED BRAIDED (POLYGLACTIN 910), SYNTHETIC ABSORBABLE SUTURE: Brand: COATED VICRYL

## (undated) DEVICE — NEPTUNE E-SEP SMOKE EVACUATION PENCIL, COATED, 70MM BLADE, PUSH BUTTON SWITCH: Brand: NEPTUNE E-SEP

## (undated) DEVICE — BETHLEHEM UNIV TOTAL KNEE, KIT: Brand: CARDINAL HEALTH

## (undated) DEVICE — VELYS ROBOT-ASSISTED SOLUTION ARRAY DRILL PIN 125 MM X 4 MM: Brand: VELYS

## (undated) DEVICE — CUFF TOURNIQUET 30 X 4 IN QUICK CONNECT DISP 1BLA

## (undated) DEVICE — SILVER-COATED ANTIMICROBIAL BARRIER DRESSING: Brand: ACTICOAT   4" X 8"

## (undated) DEVICE — 1.6MM KIRSCHNER WIRE WITH 5MM THREAD-TROCAR POINT 150MM
Type: IMPLANTABLE DEVICE | Site: TIBIA | Status: NON-FUNCTIONAL
Removed: 2025-07-03

## (undated) DEVICE — ABDOMINAL PAD: Brand: DERMACEA

## (undated) DEVICE — U-DRAPE: Brand: CONVERTORS